# Patient Record
Sex: FEMALE | Race: WHITE
[De-identification: names, ages, dates, MRNs, and addresses within clinical notes are randomized per-mention and may not be internally consistent; named-entity substitution may affect disease eponyms.]

---

## 2018-05-01 ENCOUNTER — HOSPITAL ENCOUNTER (OUTPATIENT)
Age: 58
End: 2018-05-01
Payer: COMMERCIAL

## 2018-05-01 DIAGNOSIS — R25.1: Primary | ICD-10-CM

## 2018-05-01 DIAGNOSIS — R53.83: ICD-10-CM

## 2018-05-01 PROCEDURE — 70553 MRI BRAIN STEM W/O & W/DYE: CPT

## 2018-05-01 PROCEDURE — A9585 GADOBUTROL INJECTION: HCPCS

## 2019-03-19 ENCOUNTER — HOSPITAL ENCOUNTER (OUTPATIENT)
Age: 59
End: 2019-03-19
Payer: COMMERCIAL

## 2019-03-19 DIAGNOSIS — M79.671: ICD-10-CM

## 2019-03-19 DIAGNOSIS — G57.91: Primary | ICD-10-CM

## 2019-03-19 DIAGNOSIS — M79.672: ICD-10-CM

## 2019-03-19 DIAGNOSIS — G57.92: ICD-10-CM

## 2019-03-19 PROCEDURE — 95912 NRV CNDJ TEST 11-12 STUDIES: CPT

## 2019-03-19 PROCEDURE — 95886 MUSC TEST DONE W/N TEST COMP: CPT

## 2020-12-21 ENCOUNTER — HOSPITAL ENCOUNTER (OUTPATIENT)
Age: 60
End: 2020-12-21
Payer: COMMERCIAL

## 2020-12-21 DIAGNOSIS — R20.2: ICD-10-CM

## 2020-12-21 DIAGNOSIS — Z86.69: ICD-10-CM

## 2020-12-21 DIAGNOSIS — G62.9: Primary | ICD-10-CM

## 2020-12-21 PROCEDURE — 95886 MUSC TEST DONE W/N TEST COMP: CPT

## 2020-12-21 PROCEDURE — 95910 NRV CNDJ TEST 7-8 STUDIES: CPT

## 2023-06-23 ENCOUNTER — HOSPITAL ENCOUNTER (OUTPATIENT)
Dept: DATA CONVERSION | Facility: HOSPITAL | Age: 63
End: 2023-06-23
Attending: PAIN MEDICINE | Admitting: PAIN MEDICINE
Payer: MEDICARE

## 2023-06-23 DIAGNOSIS — Z98.51 TUBAL LIGATION STATUS: ICD-10-CM

## 2023-06-23 DIAGNOSIS — I11.0 HYPERTENSIVE HEART DISEASE WITH HEART FAILURE (MULTI): ICD-10-CM

## 2023-06-23 DIAGNOSIS — E03.9 HYPOTHYROIDISM, UNSPECIFIED: ICD-10-CM

## 2023-06-23 DIAGNOSIS — I95.1 ORTHOSTATIC HYPOTENSION: ICD-10-CM

## 2023-06-23 DIAGNOSIS — Z85.3 PERSONAL HISTORY OF MALIGNANT NEOPLASM OF BREAST: ICD-10-CM

## 2023-06-23 DIAGNOSIS — G20.A1 PARKINSON'S DISEASE (MULTI): ICD-10-CM

## 2023-06-23 DIAGNOSIS — M47.816 SPONDYLOSIS WITHOUT MYELOPATHY OR RADICULOPATHY, LUMBAR REGION: ICD-10-CM

## 2023-06-23 DIAGNOSIS — M48.062 SPINAL STENOSIS, LUMBAR REGION WITH NEUROGENIC CLAUDICATION: ICD-10-CM

## 2023-06-23 DIAGNOSIS — I48.91 UNSPECIFIED ATRIAL FIBRILLATION (MULTI): ICD-10-CM

## 2023-06-23 DIAGNOSIS — E66.9 OBESITY, UNSPECIFIED: ICD-10-CM

## 2023-06-23 DIAGNOSIS — Z90.710 ACQUIRED ABSENCE OF BOTH CERVIX AND UTERUS: ICD-10-CM

## 2023-06-23 DIAGNOSIS — G90.1 FAMILIAL DYSAUTONOMIA (RILEY-DAY) (MULTI): ICD-10-CM

## 2023-06-23 DIAGNOSIS — I50.9 HEART FAILURE, UNSPECIFIED (MULTI): ICD-10-CM

## 2023-06-23 DIAGNOSIS — Z79.82 LONG TERM (CURRENT) USE OF ASPIRIN: ICD-10-CM

## 2023-06-23 DIAGNOSIS — Z90.89 ACQUIRED ABSENCE OF OTHER ORGANS: ICD-10-CM

## 2023-06-23 DIAGNOSIS — R52 PAIN, UNSPECIFIED: ICD-10-CM

## 2023-06-23 DIAGNOSIS — I34.1 NONRHEUMATIC MITRAL (VALVE) PROLAPSE: ICD-10-CM

## 2023-07-11 ENCOUNTER — PATIENT OUTREACH (OUTPATIENT)
Dept: PRIMARY CARE | Facility: CLINIC | Age: 63
End: 2023-07-11
Payer: MEDICARE

## 2023-07-11 NOTE — PROGRESS NOTES
Discharge Facility: [facility] [place]  Discharge Diagnosis:  acute kidney injury  Admission Date: 7/7/2023  Discharge Date: 7/10/2023    PCP Appointment Date: 7/12/2023  Specialist Appointment Date: NONE  Hospital Encounter and Summary: Linked   See discharge assessment below for further details    Engagement  Call Start Time: 0918 (7/11/2023  9:18 AM)    Medications  Medications reviewed with patient/caregiver?: Yes (7/11/2023  9:18 AM)  Is the patient having any side effects they believe may be caused by any medication additions or changes?: No (7/11/2023  9:18 AM)  Does the patient have all medications ordered at discharge?: Yes (no new meds) (7/11/2023  9:18 AM)  Care Management Interventions: No intervention needed (7/11/2023  9:18 AM)  Is the patient taking all medications as directed (includes completed medication regime)?: Yes (7/11/2023  9:18 AM)  Medication Comments: see med list (7/11/2023  9:18 AM)    Appointments  Does the patient have a primary care provider?: Yes (7/11/2023  9:18 AM)  Care Management Interventions: Verified appointment date/time/provider (7/11/2023  9:18 AM)  Has the patient kept scheduled appointments due by today?: Yes (7/11/2023  9:18 AM)  Care Management Interventions: Advised patient to keep appointment (7/11/2023  9:18 AM)    Self Management  What is the home health agency?: none (7/11/2023  9:18 AM)  Has home health visited the patient within 72 hours of discharge?: Not applicable (7/11/2023  9:18 AM)    Patient Teaching  Does the patient have access to their discharge instructions?: Yes (7/11/2023  9:18 AM)  Care Management Interventions: Reviewed instructions with patient (7/11/2023  9:18 AM)  What is the patient's perception of their health status since discharge?: Same (7/11/2023  9:18 AM)  Is the patient/caregiver able to teach back the hierarchy of who to call/visit for symptoms/problems? PCP, Specialist, Home Health nurse, Urgent Care, ED, 911: Yes (7/11/2023  9:18 AM)    Wrap  Up  Wrap Up Additional Comments: spoke with patient. she stated that she still is on pain. feeling abut the same as when she left the hospital. she is just using tylenol for the pain. She does have an appt with her pcp for a hospital folloqw up to address this issue. no other problems or complaints at this time. patient has my contact info if any non emergent needs arise. (7/11/2023  9:18 AM)  Call End Time: 0924 (7/11/2023  9:18 AM)

## 2023-07-12 ENCOUNTER — OFFICE VISIT (OUTPATIENT)
Dept: PRIMARY CARE | Facility: CLINIC | Age: 63
End: 2023-07-12
Payer: MEDICARE

## 2023-07-12 VITALS
BODY MASS INDEX: 34.65 KG/M2 | OXYGEN SATURATION: 94 % | DIASTOLIC BLOOD PRESSURE: 68 MMHG | HEIGHT: 70 IN | SYSTOLIC BLOOD PRESSURE: 106 MMHG | HEART RATE: 80 BPM | WEIGHT: 242 LBS

## 2023-07-12 DIAGNOSIS — E66.09 CLASS 1 OBESITY DUE TO EXCESS CALORIES WITH SERIOUS COMORBIDITY AND BODY MASS INDEX (BMI) OF 34.0 TO 34.9 IN ADULT: ICD-10-CM

## 2023-07-12 DIAGNOSIS — I10 BENIGN ESSENTIAL HYPERTENSION: ICD-10-CM

## 2023-07-12 DIAGNOSIS — M25.50 POLYARTHRALGIA: ICD-10-CM

## 2023-07-12 DIAGNOSIS — G20.A1 PARKINSON'S DISEASE (MULTI): ICD-10-CM

## 2023-07-12 DIAGNOSIS — M25.512 CHRONIC LEFT SHOULDER PAIN: ICD-10-CM

## 2023-07-12 DIAGNOSIS — Z09 HOSPITAL DISCHARGE FOLLOW-UP: Primary | ICD-10-CM

## 2023-07-12 DIAGNOSIS — G89.29 CHRONIC LEFT SHOULDER PAIN: ICD-10-CM

## 2023-07-12 DIAGNOSIS — E55.9 VITAMIN D DEFICIENCY: ICD-10-CM

## 2023-07-12 DIAGNOSIS — N28.9 ACUTE RENAL INSUFFICIENCY: ICD-10-CM

## 2023-07-12 PROBLEM — R10.9 ABDOMINAL SPASMS: Status: ACTIVE | Noted: 2023-07-12

## 2023-07-12 PROBLEM — E66.9 OBESITY: Status: ACTIVE | Noted: 2023-07-12

## 2023-07-12 PROBLEM — N95.0 POSTMENOPAUSAL BLEEDING: Status: ACTIVE | Noted: 2018-02-28

## 2023-07-12 PROBLEM — R10.11 RUQ DISCOMFORT: Status: ACTIVE | Noted: 2023-07-12

## 2023-07-12 PROBLEM — M53.3 CHRONIC SI JOINT PAIN: Status: ACTIVE | Noted: 2023-07-12

## 2023-07-12 PROBLEM — M79.642 PAIN OF LEFT HAND: Status: ACTIVE | Noted: 2023-07-12

## 2023-07-12 PROBLEM — G56.01 CARPAL TUNNEL SYNDROME OF RIGHT WRIST: Status: ACTIVE | Noted: 2023-07-12

## 2023-07-12 PROBLEM — K21.9 GERD (GASTROESOPHAGEAL REFLUX DISEASE): Status: ACTIVE | Noted: 2020-05-17

## 2023-07-12 PROBLEM — R73.02 IMPAIRED GLUCOSE TOLERANCE: Status: ACTIVE | Noted: 2023-07-12

## 2023-07-12 PROBLEM — M46.1 BILATERAL SACROILIITIS (CMS-HCC): Status: ACTIVE | Noted: 2023-07-12

## 2023-07-12 PROBLEM — M75.32 CALCIFIC TENDONITIS OF LEFT SHOULDER: Status: ACTIVE | Noted: 2017-11-16

## 2023-07-12 PROBLEM — M25.532 LEFT WRIST PAIN: Status: ACTIVE | Noted: 2023-07-12

## 2023-07-12 PROBLEM — R20.2 NUMBNESS AND TINGLING IN BOTH HANDS: Status: ACTIVE | Noted: 2023-07-12

## 2023-07-12 PROBLEM — E66.811 CLASS 1 OBESITY DUE TO EXCESS CALORIES WITH SERIOUS COMORBIDITY AND BODY MASS INDEX (BMI) OF 34.0 TO 34.9 IN ADULT: Status: ACTIVE | Noted: 2023-07-12

## 2023-07-12 PROBLEM — R20.0 NUMBNESS AND TINGLING IN BOTH HANDS: Status: ACTIVE | Noted: 2023-07-12

## 2023-07-12 PROBLEM — C77.9: Status: ACTIVE | Noted: 2020-06-16

## 2023-07-12 PROBLEM — K76.9 LIVER LESION: Status: ACTIVE | Noted: 2023-07-12

## 2023-07-12 PROBLEM — Z86.69 HISTORY OF CARPAL TUNNEL SYNDROME: Status: ACTIVE | Noted: 2023-07-12

## 2023-07-12 PROBLEM — M25.562 LEFT KNEE PAIN: Status: ACTIVE | Noted: 2023-07-12

## 2023-07-12 PROBLEM — E53.9 VITAMIN B DEFICIENCY: Status: ACTIVE | Noted: 2023-07-12

## 2023-07-12 PROBLEM — S62.328A CLOSED FRACTURE OF SHAFT OF FIFTH METACARPAL BONE: Status: ACTIVE | Noted: 2023-07-12

## 2023-07-12 PROBLEM — R50.9 FEVER: Status: ACTIVE | Noted: 2020-05-15

## 2023-07-12 PROBLEM — M46.1 BILATERAL SACROILIITIS (CMS-HCC): Status: RESOLVED | Noted: 2023-07-12 | Resolved: 2023-07-12

## 2023-07-12 PROBLEM — N30.00 ACUTE CYSTITIS: Status: ACTIVE | Noted: 2020-05-15

## 2023-07-12 PROBLEM — M25.522 LEFT ELBOW PAIN: Status: ACTIVE | Noted: 2023-07-12

## 2023-07-12 PROBLEM — I31.9 PERICARDITIS (HHS-HCC): Status: ACTIVE | Noted: 2023-07-12

## 2023-07-12 PROBLEM — M79.645 PAIN OF FINGER OF LEFT HAND: Status: ACTIVE | Noted: 2023-07-12

## 2023-07-12 PROBLEM — M75.31 CALCIFIC TENDONITIS OF RIGHT SHOULDER: Status: ACTIVE | Noted: 2017-05-16

## 2023-07-12 PROBLEM — C50.911: Status: ACTIVE | Noted: 2020-06-16

## 2023-07-12 PROBLEM — M51.36 DISC DEGENERATION, LUMBAR: Status: ACTIVE | Noted: 2023-07-12

## 2023-07-12 PROBLEM — E03.9 HYPOTHYROIDISM: Status: ACTIVE | Noted: 2020-05-17

## 2023-07-12 PROBLEM — M47.816 LUMBAR ARTHROPATHY: Status: ACTIVE | Noted: 2023-07-12

## 2023-07-12 PROBLEM — R14.0 ABDOMINAL BLOATING: Status: ACTIVE | Noted: 2023-07-12

## 2023-07-12 PROBLEM — M25.511 RIGHT SHOULDER PAIN: Status: ACTIVE | Noted: 2017-05-16

## 2023-07-12 PROBLEM — G62.9 NEUROPATHY: Status: ACTIVE | Noted: 2023-07-12

## 2023-07-12 PROBLEM — B37.0 THRUSH: Status: ACTIVE | Noted: 2020-05-18

## 2023-07-12 PROBLEM — M25.40: Status: ACTIVE | Noted: 2023-07-12

## 2023-07-12 PROBLEM — I34.1 MITRAL VALVE PROLAPSE: Status: ACTIVE | Noted: 2023-07-12

## 2023-07-12 PROBLEM — M13.0 POLYARTHROPATHY: Status: ACTIVE | Noted: 2023-07-12

## 2023-07-12 PROBLEM — M25.552 PAIN OF LEFT HIP JOINT: Status: ACTIVE | Noted: 2023-07-12

## 2023-07-12 PROBLEM — S52.123A CLOSED FRACTURE OF HEAD OF RADIUS: Status: ACTIVE | Noted: 2023-07-12

## 2023-07-12 PROBLEM — R07.89 CHEST HEAVINESS: Status: ACTIVE | Noted: 2023-07-12

## 2023-07-12 PROBLEM — M48.062 SPINAL STENOSIS, LUMBAR REGION WITH NEUROGENIC CLAUDICATION: Status: ACTIVE | Noted: 2023-07-12

## 2023-07-12 PROBLEM — E03.4 HYPOTHYROIDISM DUE TO ACQUIRED ATROPHY OF THYROID: Status: ACTIVE | Noted: 2023-07-12

## 2023-07-12 PROBLEM — E78.00 HYPERCHOLESTEREMIA: Status: ACTIVE | Noted: 2023-07-12

## 2023-07-12 PROBLEM — R25.1 TREMORS OF NERVOUS SYSTEM: Status: ACTIVE | Noted: 2023-07-12

## 2023-07-12 PROBLEM — N39.0 UTI (URINARY TRACT INFECTION): Status: ACTIVE | Noted: 2023-07-12

## 2023-07-12 PROBLEM — G56.02 CARPAL TUNNEL SYNDROME, LEFT: Status: ACTIVE | Noted: 2023-07-12

## 2023-07-12 PROBLEM — E78.2 HYPERCHOLESTEROLEMIA WITH HYPERTRIGLYCERIDEMIA: Status: ACTIVE | Noted: 2023-07-12

## 2023-07-12 PROBLEM — D64.9 LOW HEMOGLOBIN AND LOW HEMATOCRIT: Status: ACTIVE | Noted: 2023-07-12

## 2023-07-12 PROBLEM — M85.80 OSTEOPENIA: Status: ACTIVE | Noted: 2023-07-12

## 2023-07-12 PROBLEM — Z85.3 PERSONAL HISTORY OF BREAST CANCER: Status: ACTIVE | Noted: 2020-06-16

## 2023-07-12 PROBLEM — M51.369 DISC DEGENERATION, LUMBAR: Status: ACTIVE | Noted: 2023-07-12

## 2023-07-12 PROBLEM — C50.919 BREAST CANCER, FEMALE (MULTI): Status: ACTIVE | Noted: 2023-07-12

## 2023-07-12 PROBLEM — Q21.11 SECUNDUM ASD (HHS-HCC): Status: ACTIVE | Noted: 2023-07-12

## 2023-07-12 PROBLEM — M54.16 CHRONIC RADICULAR LOW BACK PAIN: Status: ACTIVE | Noted: 2023-07-12

## 2023-07-12 PROBLEM — I95.1 ORTHOSTATIC HYPOTENSION: Status: ACTIVE | Noted: 2023-07-12

## 2023-07-12 PROBLEM — I20.9 ANGINA PECTORIS (CMS-HCC): Status: RESOLVED | Noted: 2023-07-12 | Resolved: 2023-07-12

## 2023-07-12 PROBLEM — M54.50 CHRONIC LOW BACK PAIN: Status: ACTIVE | Noted: 2023-07-12

## 2023-07-12 PROBLEM — R55 RECURRENT SYNCOPE: Status: ACTIVE | Noted: 2020-05-17

## 2023-07-12 PROBLEM — K59.09 CHRONIC CONSTIPATION: Status: ACTIVE | Noted: 2023-07-12

## 2023-07-12 PROBLEM — R91.1 NODULE OF LEFT LUNG: Status: ACTIVE | Noted: 2023-07-12

## 2023-07-12 PROBLEM — M25.511 SHOULDER PAIN, BILATERAL: Status: ACTIVE | Noted: 2023-07-12

## 2023-07-12 PROBLEM — E03.4 HYPOTHYROIDISM DUE TO ACQUIRED ATROPHY OF THYROID: Status: ACTIVE | Noted: 2020-05-17

## 2023-07-12 PROBLEM — I31.39 PERICARDIAL EFFUSION (HHS-HCC): Status: ACTIVE | Noted: 2023-07-12

## 2023-07-12 PROBLEM — R42 DIZZINESS: Status: ACTIVE | Noted: 2023-07-12

## 2023-07-12 PROBLEM — I20.9 ANGINA PECTORIS (CMS-HCC): Status: ACTIVE | Noted: 2023-07-12

## 2023-07-12 PROBLEM — R53.1 WEAKNESS: Status: ACTIVE | Noted: 2023-07-12

## 2023-07-12 PROBLEM — M54.2 NECK PAIN: Status: ACTIVE | Noted: 2017-11-16

## 2023-07-12 PROCEDURE — 3078F DIAST BP <80 MM HG: CPT | Performed by: PHYSICIAN ASSISTANT

## 2023-07-12 PROCEDURE — 1036F TOBACCO NON-USER: CPT | Performed by: PHYSICIAN ASSISTANT

## 2023-07-12 PROCEDURE — 99495 TRANSJ CARE MGMT MOD F2F 14D: CPT | Performed by: PHYSICIAN ASSISTANT

## 2023-07-12 PROCEDURE — 3074F SYST BP LT 130 MM HG: CPT | Performed by: PHYSICIAN ASSISTANT

## 2023-07-12 RX ORDER — LEVOTHYROXINE SODIUM 50 UG/1
1 TABLET ORAL DAILY
COMMUNITY
Start: 2018-02-28 | End: 2024-02-14 | Stop reason: SDUPTHER

## 2023-07-12 RX ORDER — CARBIDOPA AND LEVODOPA 25; 100 MG/1; MG/1
2 TABLET, ORALLY DISINTEGRATING ORAL 4 TIMES DAILY
COMMUNITY
Start: 2019-10-17

## 2023-07-12 RX ORDER — ATORVASTATIN CALCIUM 40 MG/1
1 TABLET, FILM COATED ORAL DAILY
COMMUNITY
Start: 2020-08-05 | End: 2024-02-29 | Stop reason: SDUPTHER

## 2023-07-12 RX ORDER — RASAGILINE 1 MG/1
1 TABLET ORAL
Qty: 30 TABLET | Refills: 58 | COMMUNITY
Start: 2019-04-17 | End: 2024-01-04 | Stop reason: ALTCHOICE

## 2023-07-12 RX ORDER — ERGOCALCIFEROL 1.25 MG/1
50000 CAPSULE ORAL
COMMUNITY
Start: 2022-02-01 | End: 2023-07-12 | Stop reason: SDUPTHER

## 2023-07-12 RX ORDER — MIDODRINE HYDROCHLORIDE 5 MG/1
5 TABLET ORAL 3 TIMES DAILY
COMMUNITY
End: 2024-01-04 | Stop reason: ALTCHOICE

## 2023-07-12 RX ORDER — NAPROXEN SODIUM 220 MG/1
1 TABLET, FILM COATED ORAL DAILY
COMMUNITY

## 2023-07-12 RX ORDER — ERGOCALCIFEROL 1.25 MG/1
50000 CAPSULE ORAL
Qty: 4 CAPSULE | Refills: 11 | Status: SHIPPED | OUTPATIENT
Start: 2023-07-12 | End: 2024-07-11

## 2023-07-12 RX ORDER — LETROZOLE 2.5 MG/1
1 TABLET, FILM COATED ORAL DAILY
COMMUNITY
Start: 2022-08-01 | End: 2024-02-12 | Stop reason: SDUPTHER

## 2023-07-12 RX ORDER — PNV NO.95/FERROUS FUM/FOLIC AC 28MG-0.8MG
1 TABLET ORAL DAILY
COMMUNITY
Start: 2020-11-05

## 2023-07-12 RX ORDER — IBUPROFEN 200 MG
1 CAPSULE ORAL DAILY
COMMUNITY
Start: 2020-11-05

## 2023-07-12 RX ORDER — GABAPENTIN 300 MG/1
300 CAPSULE ORAL 4 TIMES DAILY
COMMUNITY
Start: 2022-10-13 | End: 2023-11-27 | Stop reason: SDUPTHER

## 2023-07-12 ASSESSMENT — ENCOUNTER SYMPTOMS
FLANK PAIN: 0
HEADACHES: 0
CONFUSION: 0
RHINORRHEA: 0
PALPITATIONS: 0
WHEEZING: 0
ABDOMINAL PAIN: 0
EYE REDNESS: 0
NUMBNESS: 0
EYE DISCHARGE: 0
COUGH: 0
SINUS PAIN: 0
DIARRHEA: 0
NAUSEA: 0
FREQUENCY: 0
SHORTNESS OF BREATH: 0
BRUISES/BLEEDS EASILY: 0
FATIGUE: 1
DIZZINESS: 0
CHEST TIGHTNESS: 0
ARTHRALGIAS: 1
NECK PAIN: 0
CONSTIPATION: 0
TREMORS: 0
CHILLS: 0
BACK PAIN: 0
JOINT SWELLING: 1
SLEEP DISTURBANCE: 0
VOMITING: 0
SORE THROAT: 0
WOUND: 0
FEVER: 0

## 2023-07-12 ASSESSMENT — PATIENT HEALTH QUESTIONNAIRE - PHQ9
SUM OF ALL RESPONSES TO PHQ9 QUESTIONS 1 AND 2: 0
2. FEELING DOWN, DEPRESSED OR HOPELESS: NOT AT ALL
1. LITTLE INTEREST OR PLEASURE IN DOING THINGS: NOT AT ALL

## 2023-07-12 NOTE — PROGRESS NOTES
Subjective   Patient ID: Nusrat Irizarry is a 62 y.o. female who presents for Hospital Follow-up (Has some questions about medications. Would like to know about getting cortisone shots in her left shoulder. )    HPI  Hosp discharge follow up   Diagnosed with polyarthritis and join effusion and acute kidney injury    She was given a course of Abx and fluids and symptoms improved so was discharged and set to follow up with ortho in the near future       L shoulder pain and hx of cortisone injection in the past.  She is interested in injections again.  Advised to further discuss with ortho - Dr Banks     med check   hypotension -stable   L shoulder/back pain -previously following with dr saravia  hypothyroid - on meds   vit D -    onc - transferred to       Preventative Testing   Mammo - april 2023  DEXA- april 2022 osteopenia   colonoscopy nov 2019 - dr chun - repeat in 5 years   PAP - 2018 - hx of hysterectomy   lung nodule - monitoring on imaging - July 2022- unchanged   Depression screen - PHQ2 NEG July 2023   Fall -NEG JULY 2023     Patient Active Problem List   Diagnosis    Vitamin D deficiency    Vitamin B deficiency    UTI (urinary tract infection)    Tremors of nervous system    Spinal stenosis, lumbar region with neurogenic claudication    Secundum ASD    RUQ discomfort    Polyarthropathy    Joint effusion of lower extremity    Pericarditis    Pericardial effusion    Parkinson's disease (CMS/HCC)    Pain of left hip joint    Shoulder pain, bilateral    Pain in left shoulder    Left elbow pain    Osteopenia    Orthostatic hypotension    Obesity    Numbness and tingling in both hands    Nodule of left lung    Neuropathy    Mitral valve prolapse    Lumbar arthropathy    Low hemoglobin and low hematocrit    Liver lesion    Pain of left hand    Pain of finger of left hand    Left wrist pain    Left knee pain    Kidney lesion    Impaired glucose tolerance    Hypothyroidism due to acquired atrophy of thyroid     Hypercholesterolemia with hypertriglyceridemia    Hypercholesteremia    History of carpal tunnel syndrome    Dizziness    Disc degeneration, lumbar    Closed fracture of shaft of fifth metacarpal bone    Closed fracture of head of radius    Chronic SI joint pain    Chronic radicular low back pain    Chronic low back pain    Chronic constipation    Chest heaviness    Carpal tunnel syndrome, left    Carpal tunnel syndrome of right wrist    Breast cancer, female (CMS/HCC)    Bilateral sacroiliitis (CMS/HCC)    Benign essential hypertension    Angina pectoris (CMS/HCC)    Acute renal insufficiency    Abdominal spasms    Abdominal bloating    Weakness    Ostium secundum atrial septal defect    Right shoulder pain    Acute cystitis    Borderline systolic hypertension    Calcific tendonitis of left shoulder    Calcific tendonitis of right shoulder    Fever    GERD (gastroesophageal reflux disease)    Hypertension    Hypothyroidism    Neck pain    Postmenopausal bleeding    Recurrent syncope    Right heart enlargement    Thrush    Right breast cancer with malignant cells in regional lymph nodes no greater than 0.2 mm and no more than 200 cells (CMS/HCC)       Review of Systems   Constitutional:  Positive for fatigue. Negative for chills and fever.   HENT:  Negative for congestion, rhinorrhea, sinus pain, sore throat and tinnitus.    Eyes:  Negative for discharge, redness and visual disturbance.   Respiratory:  Negative for cough, chest tightness, shortness of breath and wheezing.    Cardiovascular:  Negative for chest pain, palpitations and leg swelling.   Gastrointestinal:  Negative for abdominal pain, constipation, diarrhea, nausea and vomiting.   Endocrine: Negative for cold intolerance and heat intolerance.   Genitourinary:  Negative for flank pain, frequency and urgency.   Musculoskeletal:  Positive for arthralgias and joint swelling. Negative for back pain, gait problem and neck pain.   Skin:  Negative for rash and  wound.   Neurological:  Negative for dizziness, tremors, syncope, numbness and headaches.   Hematological:  Does not bruise/bleed easily.   Psychiatric/Behavioral:  Negative for confusion, sleep disturbance and suicidal ideas.        Past Medical History:   Diagnosis Date    Other specified postprocedural states     History of Papanicolaou smear    Personal history of colonic polyps     Personal history of colonic polyps    Personal history of other diseases of the circulatory system     History of hypertension    Personal history of other endocrine, nutritional and metabolic disease     History of hypothyroidism    Personal history of other endocrine, nutritional and metabolic disease     History of hyperglycemia    Personal history of other malignant neoplasm of skin     History of malignant neoplasm of skin    Personal history of other medical treatment     History of screening mammography       Past Surgical History:   Procedure Laterality Date    APPENDECTOMY  10/02/2018    Appendectomy    CARDIAC SURGERY  04/20/2020    Heart Surgery    COLONOSCOPY  04/20/2020    Colonoscopy    CT HEART CORONARY ANGIOGRAM  9/16/2021    CT HEART CORONARY ANGIOGRAM 9/16/2021 [facility]    HYSTERECTOMY  04/20/2020    Hysterectomy    INCISIONAL BREAST BIOPSY  10/02/2018    Incisional Breast Biopsy    OTHER SURGICAL HISTORY  10/02/2018    Bunion Correction By Osborne Procedure    OTHER SURGICAL HISTORY  01/05/2023    Intra-articular corticosteroid injection    OTHER SURGICAL HISTORY  01/21/2022    Epidural steroid injection    OTHER SURGICAL HISTORY  12/08/2021    Radiofrequency ablation    OTHER SURGICAL HISTORY  08/23/2021    Medial branch block    OTHER SURGICAL HISTORY  09/01/2021    Carpal tunnel surgery    OTHER SURGICAL HISTORY  09/23/2021    Medial branch block    OTHER SURGICAL HISTORY  06/17/2022    Epidural steroid injection    OTHER SURGICAL HISTORY  11/17/2022    Epidural steroid injection    OTHER SURGICAL  "HISTORY  10/10/2018    Axillary Lymphadenectomy    OTHER SURGICAL HISTORY  04/07/2021    Colonoscopy    TONSILLECTOMY  04/20/2020    Tonsillectomy With Adenoidectomy    TUBAL LIGATION  04/20/2020    Tubal Ligation       No family history on file.    Social History     Tobacco Use    Smoking status: Never    Smokeless tobacco: Never   Vaping Use    Vaping Use: Never used   Substance Use Topics    Alcohol use: Yes    Drug use: Never       Allergies   Allergen Reactions    Bee Venom Protein (Honey Bee) Anaphylaxis       Current Outpatient Medications   Medication Sig Dispense Refill    aspirin 81 mg chewable tablet Chew 1 tablet (81 mg) once daily.      atorvastatin (Lipitor) 40 mg tablet Take 1 tablet (40 mg) by mouth once daily.      calcium citrate (Calcitrate) 200 mg (950 mg) tablet Take 1 tablet (200 mg) by mouth once daily.      carbidopa-levodopa (Parcopa)  mg disintegrating tablet Take 2 tablets by mouth 4 times a day.      cyanocobalamin (Vitamin B-12) 100 mcg tablet Take 1 tablet (100 mcg) by mouth once daily.      ergocalciferol (Vitamin D-2) 1.25 MG (21282 UT) capsule Take 1 capsule (50,000 Units) by mouth 1 (one) time per week.      gabapentin (Neurontin) 300 mg capsule Take 1 capsule (300 mg) by mouth 4 times a day.      letrozole (Femara) 2.5 mg tablet Take 1 tablet (2.5 mg total) by mouth once daily.      levothyroxine (Synthroid, Levoxyl) 50 mcg tablet Take 1 tablet (50 mcg) by mouth once daily.      midodrine (Proamatine) 5 mg tablet Take 1 tablet (5 mg) by mouth 3 times a day.      rasagiline (Azilect) 1 mg tablet Take 1 tablet (1 mg) by mouth once daily. 30 tablet 58     No current facility-administered medications for this visit.       Objective   /68   Pulse 80   Ht 1.778 m (5' 10\")   Wt 110 kg (242 lb)   SpO2 94%   BMI 34.72 kg/m²     Physical Exam  Vitals reviewed.   Constitutional:       Appearance: Normal appearance. She is obese.   HENT:      Head: Normocephalic.      Right " Ear: External ear normal.      Left Ear: External ear normal.      Nose: Nose normal. No congestion or rhinorrhea.      Mouth/Throat:      Mouth: Mucous membranes are moist.   Eyes:      Extraocular Movements: Extraocular movements intact.      Conjunctiva/sclera: Conjunctivae normal.      Pupils: Pupils are equal, round, and reactive to light.   Cardiovascular:      Rate and Rhythm: Normal rate and regular rhythm.      Pulses: Normal pulses.   Pulmonary:      Effort: Pulmonary effort is normal.      Breath sounds: Normal breath sounds.   Abdominal:      General: Bowel sounds are normal.      Palpations: Abdomen is soft.      Tenderness: There is no abdominal tenderness. There is no right CVA tenderness or left CVA tenderness.   Musculoskeletal:         General: No tenderness. Normal range of motion.      Cervical back: Normal range of motion and neck supple. No tenderness.   Skin:     General: Skin is warm and dry.   Neurological:      General: No focal deficit present.      Mental Status: She is alert and oriented to person, place, and time.   Psychiatric:         Mood and Affect: Mood normal.         Behavior: Behavior normal.       Testing   Reviewed reports and imaging on file from the hosp stay       Impression    MDM    1) COMPLEXITY: MORE THAN 1 STABLE CHRONIC CONDITION ADDRESSED  2)DATA: TESTS INTERPRETED AND OR ORDERED, TOOK INDEPENDENT HISTORY OR RECORDS REVIEWED  3)RISK: MODERATE RISK DUE TO NATURE OF MEDICAL CONDITIONS/COMORBIDITY OR MEDICATIONS ORDERED OR SURGICAL OR PROCEDURE REFERRAL, .    Reviewed labs and Testing on file   Patient to follow diet low in cholesterol, fat, and sodium.    Patient is advised to increase Exercise.  Patient is recommended to lose weight.  Reviewed Meds and discussed common side effects  Continue as directed   Knee concerns - follow with dr Banks - we discussed less likely septic arthritic given PE and some of her labs but consider additional work up and to see ortho for  this   Consider connective tissue d/o and consider auto-immune work up = pt to call if she wishes to pursue  BP - improving since hops and is trending low again so re-start midodarine   Shoulder concerns - follow with ortho   Patient is strongly advised to be compliant with recommendations.    Return to Clinic sooner if needed.  Patient denies further questions/concerns at this time       Assessment/Plan   Problem List Items Addressed This Visit       Vitamin D deficiency    Relevant Medications    ergocalciferol (Vitamin D-2) 1.25 MG (45221 UT) capsule    Parkinson's disease (CMS/HCC)    Pain in left shoulder    Class 1 obesity due to excess calories with serious comorbidity and body mass index (BMI) of 34.0 to 34.9 in adult    Benign essential hypertension    Acute renal insufficiency    Polyarthralgia     Other Visit Diagnoses       Hospital discharge follow-up    -  Primary             FU as before in sept

## 2023-07-18 ENCOUNTER — PATIENT OUTREACH (OUTPATIENT)
Dept: PRIMARY CARE | Facility: CLINIC | Age: 63
End: 2023-07-18
Payer: MEDICARE

## 2023-07-18 LAB
ANION GAP IN SER/PLAS: 11 MMOL/L (ref 10–20)
C REACTIVE PROTEIN (MG/L) IN SER/PLAS: 2.48 MG/DL
CALCIUM (MG/DL) IN SER/PLAS: 8.9 MG/DL (ref 8.6–10.3)
CARBON DIOXIDE, TOTAL (MMOL/L) IN SER/PLAS: 28 MMOL/L (ref 21–32)
CHLORIDE (MMOL/L) IN SER/PLAS: 105 MMOL/L (ref 98–107)
CREATININE (MG/DL) IN SER/PLAS: 1.2 MG/DL (ref 0.5–1.05)
GFR FEMALE: 50 ML/MIN/1.73M2
GLUCOSE (MG/DL) IN SER/PLAS: 110 MG/DL (ref 74–99)
POTASSIUM (MMOL/L) IN SER/PLAS: 4.4 MMOL/L (ref 3.5–5.3)
SEDIMENTATION RATE, ERYTHROCYTE: 27 MM/H (ref 0–30)
SODIUM (MMOL/L) IN SER/PLAS: 140 MMOL/L (ref 136–145)
UREA NITROGEN (MG/DL) IN SER/PLAS: 26 MG/DL (ref 6–23)

## 2023-07-18 NOTE — PROGRESS NOTES
Unable to reach patient for call back after patient's follow up appointment with PCP.   YUNIORM with call back number for patient to call if needed   If no voicemail available call attempts x 2 were made to contact the patient to assist with any questions or concerns patient may have.

## 2023-07-19 ENCOUNTER — TELEPHONE (OUTPATIENT)
Dept: PRIMARY CARE | Facility: CLINIC | Age: 63
End: 2023-07-19
Payer: MEDICARE

## 2023-07-19 NOTE — TELEPHONE ENCOUNTER
Rosario Peres NP from ortho sees Nusrat and she was hospitalized with acute kidney injury and polyarthrigia and wants to talk to you about a plan for her her number is [phone] she will be in office from 12-1

## 2023-08-08 PROBLEM — M25.462 EFFUSION OF LEFT KNEE: Status: RESOLVED | Noted: 2023-08-08 | Resolved: 2023-08-08

## 2023-08-08 PROBLEM — R53.83 MALAISE AND FATIGUE: Status: ACTIVE | Noted: 2023-08-08

## 2023-08-08 PROBLEM — C50.919 BREAST CANCER, FEMALE (MULTI): Status: ACTIVE | Noted: 2023-08-08

## 2023-08-08 PROBLEM — R53.81 MALAISE AND FATIGUE: Status: ACTIVE | Noted: 2023-08-08

## 2023-08-16 ENCOUNTER — OFFICE VISIT (OUTPATIENT)
Dept: PRIMARY CARE | Facility: CLINIC | Age: 63
End: 2023-08-16
Payer: MEDICARE

## 2023-08-16 VITALS
HEART RATE: 78 BPM | DIASTOLIC BLOOD PRESSURE: 79 MMHG | HEIGHT: 69 IN | BODY MASS INDEX: 32.73 KG/M2 | SYSTOLIC BLOOD PRESSURE: 122 MMHG | WEIGHT: 221 LBS

## 2023-08-16 DIAGNOSIS — R42 DIZZINESS: ICD-10-CM

## 2023-08-16 DIAGNOSIS — N28.9 ACUTE RENAL INSUFFICIENCY: Primary | ICD-10-CM

## 2023-08-16 DIAGNOSIS — R53.83 MALAISE AND FATIGUE: ICD-10-CM

## 2023-08-16 DIAGNOSIS — R53.81 MALAISE AND FATIGUE: ICD-10-CM

## 2023-08-16 DIAGNOSIS — Z85.3 PERSONAL HISTORY OF BREAST CANCER: ICD-10-CM

## 2023-08-16 DIAGNOSIS — G20.A1 PARKINSON'S DISEASE (MULTI): ICD-10-CM

## 2023-08-16 DIAGNOSIS — G62.9 NEUROPATHY: ICD-10-CM

## 2023-08-16 DIAGNOSIS — E66.09 CLASS 1 OBESITY DUE TO EXCESS CALORIES WITH SERIOUS COMORBIDITY AND BODY MASS INDEX (BMI) OF 32.0 TO 32.9 IN ADULT: ICD-10-CM

## 2023-08-16 DIAGNOSIS — I95.1 ORTHOSTATIC HYPOTENSION: ICD-10-CM

## 2023-08-16 DIAGNOSIS — I10 BENIGN ESSENTIAL HYPERTENSION: ICD-10-CM

## 2023-08-16 PROBLEM — B37.0 THRUSH: Status: RESOLVED | Noted: 2020-05-18 | Resolved: 2023-08-16

## 2023-08-16 PROBLEM — N39.0 UTI (URINARY TRACT INFECTION): Status: RESOLVED | Noted: 2023-07-12 | Resolved: 2023-08-16

## 2023-08-16 PROBLEM — C50.919 BREAST CANCER, FEMALE (MULTI): Status: RESOLVED | Noted: 2023-08-08 | Resolved: 2023-08-16

## 2023-08-16 PROBLEM — N30.00 ACUTE CYSTITIS: Status: RESOLVED | Noted: 2020-05-15 | Resolved: 2023-08-16

## 2023-08-16 PROCEDURE — 3008F BODY MASS INDEX DOCD: CPT | Performed by: PHYSICIAN ASSISTANT

## 2023-08-16 PROCEDURE — 3074F SYST BP LT 130 MM HG: CPT | Performed by: PHYSICIAN ASSISTANT

## 2023-08-16 PROCEDURE — 3078F DIAST BP <80 MM HG: CPT | Performed by: PHYSICIAN ASSISTANT

## 2023-08-16 PROCEDURE — 99214 OFFICE O/P EST MOD 30 MIN: CPT | Performed by: PHYSICIAN ASSISTANT

## 2023-08-16 PROCEDURE — 1036F TOBACCO NON-USER: CPT | Performed by: PHYSICIAN ASSISTANT

## 2023-08-16 ASSESSMENT — ENCOUNTER SYMPTOMS
FLANK PAIN: 0
FREQUENCY: 0
FATIGUE: 0
BACK PAIN: 0
SHORTNESS OF BREATH: 0
NAUSEA: 0
BRUISES/BLEEDS EASILY: 0
ABDOMINAL PAIN: 0
NUMBNESS: 1
SORE THROAT: 0
DIARRHEA: 0
RHINORRHEA: 0
WOUND: 0
FEVER: 0
CHILLS: 0
SINUS PAIN: 0
CONSTIPATION: 0
PALPITATIONS: 0
WHEEZING: 0
EYE REDNESS: 0
EYE DISCHARGE: 0
COUGH: 0
VOMITING: 0
CHEST TIGHTNESS: 0
DIZZINESS: 0
CONFUSION: 0
TREMORS: 0
HEADACHES: 0
SLEEP DISTURBANCE: 0
NECK PAIN: 0

## 2023-08-16 ASSESSMENT — PATIENT HEALTH QUESTIONNAIRE - PHQ9
2. FEELING DOWN, DEPRESSED OR HOPELESS: NOT AT ALL
SUM OF ALL RESPONSES TO PHQ9 QUESTIONS 1 AND 2: 0
1. LITTLE INTEREST OR PLEASURE IN DOING THINGS: NOT AT ALL

## 2023-08-16 NOTE — PROGRESS NOTES
Subjective   Patient ID: Nusrat Irizarry is a 62 y.o. female who presents for Follow-up (HOSP DISCHARGE F/U-REACTION TO CANCER TREATMENT--ACUTE KIDNEY INJURY; KIDNEY FUNCTION LOW-DISCUSS LABS)    HPI    Hosp discharge follow up - was seen July 12 2023 for this   Diagnosed with polyarthritis and join effusion and acute kidney injury    She was given a course of Abx and fluids and symptoms improved so was discharged and set to follow up with ortho iwhich she has since seen and joint prob is not resolved /stable   7/7/23 eGFR 38   7/9/23 eGFR 52  7/10/23 eGFR 65  7/18/23 eGFR 50        med check   hypotension -stable   L shoulder/back pain -previously following with dr saravia  hypothyroid - on meds   vit D -    Breast cancer - onc - transferred to        Preventative Testing   Mammo - april 2023  DEXA- april 2022 osteopenia   colonoscopy nov 2019 - dr chun - repeat in 5 years   PAP - 2018 - hx of hysterectomy   lung nodule - monitoring on imaging - July 2022- unchanged   Depression screen - PHQ2 NEG July 2023   Fall -NEG JULY 2023      Patient Active Problem List   Diagnosis    Vitamin D deficiency    Vitamin B deficiency    Tremors of nervous system    Spinal stenosis, lumbar region with neurogenic claudication    Secundum ASD    RUQ discomfort    Polyarthropathy    Joint effusion of lower extremity    Pericarditis    Pericardial effusion    Parkinson's disease (CMS/HCC)    Pain of left hip joint    Shoulder pain, bilateral    Pain in left shoulder    Left elbow pain    Osteopenia    Orthostatic hypotension    Class 1 obesity due to excess calories with serious comorbidity and body mass index (BMI) of 34.0 to 34.9 in adult    Numbness and tingling in both hands    Nodule of left lung    Neuropathy    Mitral valve prolapse    Lumbar arthropathy    Low hemoglobin and low hematocrit    Liver lesion    Pain of left hand    Pain of finger of left hand    Left wrist pain    Left knee pain    Kidney lesion    Impaired  glucose tolerance    Hypothyroidism due to acquired atrophy of thyroid    Hypercholesterolemia with hypertriglyceridemia    Hypercholesteremia    History of carpal tunnel syndrome    Dizziness    Disc degeneration, lumbar    Closed fracture of shaft of fifth metacarpal bone    Closed fracture of head of radius    Chronic SI joint pain    Chronic radicular low back pain    Chronic low back pain    Chronic constipation    Chest heaviness    Carpal tunnel syndrome, left    Carpal tunnel syndrome of right wrist    Benign essential hypertension    Acute renal insufficiency    Abdominal spasms    Abdominal bloating    Weakness    Ostium secundum atrial septal defect    Right shoulder pain    Calcific tendonitis of left shoulder    Calcific tendonitis of right shoulder    Fever    GERD (gastroesophageal reflux disease)    Neck pain    Postmenopausal bleeding    Recurrent syncope    Right heart enlargement    Personal history of breast cancer    Polyarthralgia    Breast cancer, female (CMS/HCC)    Malaise and fatigue       Review of Systems   Constitutional:  Negative for chills, fatigue and fever.   HENT:  Negative for congestion, rhinorrhea, sinus pain, sore throat and tinnitus.    Eyes:  Negative for discharge, redness and visual disturbance.   Respiratory:  Negative for cough, chest tightness, shortness of breath and wheezing.    Cardiovascular:  Negative for chest pain, palpitations and leg swelling.   Gastrointestinal:  Negative for abdominal pain, constipation, diarrhea, nausea and vomiting.   Endocrine: Negative for cold intolerance and heat intolerance.   Genitourinary:  Negative for flank pain, frequency and urgency.   Musculoskeletal:  Negative for back pain, gait problem and neck pain.   Skin:  Negative for rash and wound.   Neurological:  Positive for numbness. Negative for dizziness, tremors, syncope and headaches.   Hematological:  Does not bruise/bleed easily.   Psychiatric/Behavioral:  Negative for  confusion, sleep disturbance and suicidal ideas.        Past Medical History:   Diagnosis Date    Effusion of left knee 08/08/2023    Other specified postprocedural states     History of Papanicolaou smear    Personal history of colonic polyps     Personal history of colonic polyps    Personal history of other diseases of the circulatory system     History of hypertension    Personal history of other endocrine, nutritional and metabolic disease     History of hypothyroidism    Personal history of other endocrine, nutritional and metabolic disease     History of hyperglycemia    Personal history of other malignant neoplasm of skin     History of malignant neoplasm of skin    Personal history of other medical treatment     History of screening mammography       Past Surgical History:   Procedure Laterality Date    APPENDECTOMY      Appendectomy    CARDIAC SURGERY  2013    REPAIR OF HOLE IN HEART    COLONOSCOPY  11/18/2019    NORMAL; H/O COLONIC POLYPS IN PAST-REPEAT IN 5 YEARS    CT HEART CORONARY ANGIOGRAM  09/16/2021    CT HEART CORONARY ANGIOGRAM 9/16/2021 [facility] ANCILLARY LEGACY    HYSTERECTOMY  04/16/2018    [facility]    INCISIONAL BREAST BIOPSY      Incisional Breast Biopsy    LYMPHADENECTOMY      axillary    OTHER SURGICAL HISTORY  10/02/2018    Bunion Correction By Osborne Procedure    OTHER SURGICAL HISTORY  01/05/2023    Intra-articular corticosteroid injection    OTHER SURGICAL HISTORY  01/21/2022    Epidural steroid injection    OTHER SURGICAL HISTORY  12/08/2021    Radiofrequency ablation    OTHER SURGICAL HISTORY  08/23/2021    Medial branch block    OTHER SURGICAL HISTORY  09/01/2021    Carpal tunnel surgery    OTHER SURGICAL HISTORY  09/23/2021    Medial branch block    OTHER SURGICAL HISTORY  06/17/2022    Epidural steroid injection    OTHER SURGICAL HISTORY  11/17/2022    Epidural steroid injection    TONSILLECTOMY  1968    Tonsillectomy With Adenoidectomy    TUBAL LIGATION  1993     "Tubal Ligation       Family History   Problem Relation Name Age of Onset    Heart disease Mother      Heart failure Father      Hyperthyroidism Sister      Lung cancer Sister      Breast cancer Other GRANDPARENT        Social History     Tobacco Use    Smoking status: Never    Smokeless tobacco: Never   Vaping Use    Vaping Use: Never used   Substance Use Topics    Alcohol use: Yes    Drug use: Never       Allergies   Allergen Reactions    Bee Venom Protein (Honey Bee) Anaphylaxis    Zoledronic Acid Swelling       Current Outpatient Medications   Medication Sig Dispense Refill    aspirin 81 mg chewable tablet Chew 1 tablet (81 mg) once daily.      atorvastatin (Lipitor) 40 mg tablet Take 1 tablet (40 mg) by mouth once daily.      calcium citrate (Calcitrate) 200 mg (950 mg) tablet Take 1 tablet (200 mg) by mouth once daily.      carbidopa-levodopa (Parcopa)  mg disintegrating tablet Take 2 tablets by mouth 4 times a day.      cyanocobalamin (Vitamin B-12) 100 mcg tablet Take 1 tablet (100 mcg) by mouth once daily.      ergocalciferol (Vitamin D-2) 1.25 MG (34063 UT) capsule Take 1 capsule (50,000 Units) by mouth 1 (one) time per week. 4 capsule 11    gabapentin (Neurontin) 300 mg capsule Take 1 capsule (300 mg) by mouth 4 times a day.      letrozole (Femara) 2.5 mg tablet Take 1 tablet (2.5 mg total) by mouth once daily.      levothyroxine (Synthroid, Levoxyl) 50 mcg tablet Take 1 tablet (50 mcg) by mouth once daily.      midodrine (Proamatine) 5 mg tablet Take 1 tablet (5 mg) by mouth 3 times a day.      rasagiline (Azilect) 1 mg tablet Take 1 tablet (1 mg) by mouth once daily. 30 tablet 58     No current facility-administered medications for this visit.       Objective   /79   Pulse 78   Ht 1.753 m (5' 9\")   Wt 100 kg (221 lb)   BMI 32.64 kg/m²     Physical Exam  Constitutional:       Appearance: Normal appearance. She is obese.   HENT:      Head: Normocephalic.      Right Ear: External ear normal. "      Left Ear: External ear normal.      Nose: Nose normal. No congestion or rhinorrhea.      Mouth/Throat:      Mouth: Mucous membranes are moist.   Eyes:      Extraocular Movements: Extraocular movements intact.      Conjunctiva/sclera: Conjunctivae normal.      Pupils: Pupils are equal, round, and reactive to light.   Cardiovascular:      Rate and Rhythm: Normal rate and regular rhythm.      Pulses: Normal pulses.   Pulmonary:      Effort: Pulmonary effort is normal.      Breath sounds: Normal breath sounds.   Abdominal:      General: Bowel sounds are normal.      Palpations: Abdomen is soft.      Tenderness: There is no abdominal tenderness. There is no right CVA tenderness or left CVA tenderness.   Musculoskeletal:         General: No tenderness. Normal range of motion.      Cervical back: Normal range of motion and neck supple. No tenderness.   Skin:     General: Skin is warm and dry.   Neurological:      General: No focal deficit present.      Mental Status: She is alert and oriented to person, place, and time.   Psychiatric:         Mood and Affect: Mood normal.         Behavior: Behavior normal.       Testing   Reviewed labs on file and hops report again       Impression    MDM    1) COMPLEXITY: MORE THAN 1 STABLE CHRONIC CONDITION ADDRESSED  2)DATA: TESTS INTERPRETED AND OR ORDERED, TOOK INDEPENDENT HISTORY OR RECORDS REVIEWED  3)RISK: MODERATE RISK DUE TO NATURE OF MEDICAL CONDITIONS/COMORBIDITY OR MEDICATIONS ORDERED OR SURGICAL OR PROCEDURE REFERRAL, .       Reviewed labs and Testing on file   Patient to follow diet low in cholesterol, fat, and sodium.    Patient is advised to increase Exercise.  Patient is recommended to lose weight.  Reviewed Meds and discussed common side effects  Continue as directed   I question if the repeat labs were the kidney dipped again are secondary to still dealing with the pain and possibly on NSAIDs at that time?  I have advised we get updated labs for further eval    Discussed many common causes including but not limited to dehydration, infections, meds, DM, HTN, BORIS  Will aim to have labs done in the next month and follow up for further eval   Discussed weaning off some of her other meds and she should discuss with her specialists first.  I explained the need to make no more than 1 change /time to better monitoring   Patient is strongly advised to be compliant with recommendations.    Return to Clinic sooner if needed.  Patient denies further questions/concerns at this time     Assessment/Plan   Problem List Items Addressed This Visit       Parkinson's disease (CMS/[facility])    Orthostatic hypotension    Class 1 obesity due to excess calories with serious comorbidity and body mass index (BMI) of 32.0 to 32.9 in adult    Neuropathy    Dizziness    Benign essential hypertension    Acute renal insufficiency - Primary    Personal history of breast cancer    Malaise and fatigue        FU as before in sept

## 2023-08-17 NOTE — TELEPHONE ENCOUNTER
Patient was seen and her kidney concerns were addressed  She is set for repeat labs in the next couple weeks to recheck the kidney function as well   Thanks

## 2023-08-18 ENCOUNTER — PATIENT OUTREACH (OUTPATIENT)
Dept: PRIMARY CARE | Facility: CLINIC | Age: 63
End: 2023-08-18
Payer: MEDICARE

## 2023-09-06 LAB
CHOLESTEROL (MG/DL) IN SER/PLAS EXTERNAL: 132 MG/DL
CHOLESTEROL IN HDL (MG/DL) IN SER/PLAS EXTERNAL: 51 MG/DL
CHOLESTEROL IN LDL (MG/DL) IN SERUM OR PLASMA BY CALCULATION EXTERNAL: 63 MG/DL
ESTIMATED AVERAGE GLUCOSE FOR HBA1C EXTERNAL: 96 MG/DL
HEMOGLOBIN A1C/HEMOGLOBIN TOTAL IN BLOOD EXTERNAL: 5.6 %
TRIGLYCERIDE (MG/DL) IN SER/PLAS EXTERNAL: 98 MG/DL
VLDL EXTERNAL: 18 MG/DL

## 2023-09-07 VITALS — WEIGHT: 222.44 LBS | HEIGHT: 69 IN | BODY MASS INDEX: 32.95 KG/M2

## 2023-09-12 ENCOUNTER — OFFICE VISIT (OUTPATIENT)
Dept: PRIMARY CARE | Facility: CLINIC | Age: 63
End: 2023-09-12
Payer: MEDICARE

## 2023-09-12 VITALS
SYSTOLIC BLOOD PRESSURE: 114 MMHG | HEART RATE: 72 BPM | WEIGHT: 219 LBS | BODY MASS INDEX: 32.44 KG/M2 | DIASTOLIC BLOOD PRESSURE: 70 MMHG | HEIGHT: 69 IN

## 2023-09-12 DIAGNOSIS — Z00.00 WELCOME TO MEDICARE PREVENTIVE VISIT: Primary | ICD-10-CM

## 2023-09-12 DIAGNOSIS — Z00.00 ROUTINE GENERAL MEDICAL EXAMINATION AT HEALTH CARE FACILITY: ICD-10-CM

## 2023-09-12 DIAGNOSIS — E03.4 HYPOTHYROIDISM DUE TO ACQUIRED ATROPHY OF THYROID: ICD-10-CM

## 2023-09-12 DIAGNOSIS — E55.9 VITAMIN D DEFICIENCY: ICD-10-CM

## 2023-09-12 DIAGNOSIS — E78.2 HYPERCHOLESTEROLEMIA WITH HYPERTRIGLYCERIDEMIA: ICD-10-CM

## 2023-09-12 DIAGNOSIS — Z85.3 PERSONAL HISTORY OF BREAST CANCER: ICD-10-CM

## 2023-09-12 DIAGNOSIS — I10 BENIGN ESSENTIAL HYPERTENSION: ICD-10-CM

## 2023-09-12 DIAGNOSIS — Z23 NEED FOR INFLUENZA VACCINATION: ICD-10-CM

## 2023-09-12 DIAGNOSIS — Z71.89 ADVANCED CARE PLANNING/COUNSELING DISCUSSION: ICD-10-CM

## 2023-09-12 DIAGNOSIS — E66.09 CLASS 1 OBESITY DUE TO EXCESS CALORIES WITH SERIOUS COMORBIDITY AND BODY MASS INDEX (BMI) OF 32.0 TO 32.9 IN ADULT: ICD-10-CM

## 2023-09-12 DIAGNOSIS — G20.A1 PARKINSON'S DISEASE (MULTI): ICD-10-CM

## 2023-09-12 DIAGNOSIS — R73.02 GLUCOSE INTOLERANCE (IMPAIRED GLUCOSE TOLERANCE): ICD-10-CM

## 2023-09-12 DIAGNOSIS — E53.9 VITAMIN B DEFICIENCY: ICD-10-CM

## 2023-09-12 PROBLEM — R07.89 CHEST HEAVINESS: Status: RESOLVED | Noted: 2023-07-12 | Resolved: 2023-09-12

## 2023-09-12 PROBLEM — N28.9 ACUTE RENAL INSUFFICIENCY: Status: RESOLVED | Noted: 2023-07-12 | Resolved: 2023-09-12

## 2023-09-12 PROBLEM — R50.9 FEVER: Status: RESOLVED | Noted: 2020-05-15 | Resolved: 2023-09-12

## 2023-09-12 PROCEDURE — 90662 IIV NO PRSV INCREASED AG IM: CPT | Performed by: PHYSICIAN ASSISTANT

## 2023-09-12 PROCEDURE — 3008F BODY MASS INDEX DOCD: CPT | Performed by: PHYSICIAN ASSISTANT

## 2023-09-12 PROCEDURE — 1036F TOBACCO NON-USER: CPT | Performed by: PHYSICIAN ASSISTANT

## 2023-09-12 PROCEDURE — 3078F DIAST BP <80 MM HG: CPT | Performed by: PHYSICIAN ASSISTANT

## 2023-09-12 PROCEDURE — 1170F FXNL STATUS ASSESSED: CPT | Performed by: PHYSICIAN ASSISTANT

## 2023-09-12 PROCEDURE — 1160F RVW MEDS BY RX/DR IN RCRD: CPT | Performed by: PHYSICIAN ASSISTANT

## 2023-09-12 PROCEDURE — 3074F SYST BP LT 130 MM HG: CPT | Performed by: PHYSICIAN ASSISTANT

## 2023-09-12 PROCEDURE — G0402 INITIAL PREVENTIVE EXAM: HCPCS | Performed by: PHYSICIAN ASSISTANT

## 2023-09-12 PROCEDURE — 1159F MED LIST DOCD IN RCRD: CPT | Performed by: PHYSICIAN ASSISTANT

## 2023-09-12 PROCEDURE — G0008 ADMIN INFLUENZA VIRUS VAC: HCPCS | Performed by: PHYSICIAN ASSISTANT

## 2023-09-12 PROCEDURE — 99214 OFFICE O/P EST MOD 30 MIN: CPT | Performed by: PHYSICIAN ASSISTANT

## 2023-09-12 ASSESSMENT — ENCOUNTER SYMPTOMS
SHORTNESS OF BREATH: 0
EYE DISCHARGE: 0
DIZZINESS: 0
EYE REDNESS: 0
SORE THROAT: 0
VOMITING: 0
NECK PAIN: 0
RHINORRHEA: 0
CONFUSION: 0
WHEEZING: 0
FEVER: 0
FATIGUE: 0
SLEEP DISTURBANCE: 0
FREQUENCY: 0
CONSTIPATION: 0
CHEST TIGHTNESS: 0
HEADACHES: 0
PALPITATIONS: 0
BRUISES/BLEEDS EASILY: 0
CHILLS: 0
BACK PAIN: 0
FLANK PAIN: 0
WOUND: 0
TREMORS: 0
NAUSEA: 0
SINUS PAIN: 0
DIARRHEA: 0
NUMBNESS: 0
ABDOMINAL PAIN: 0
COUGH: 0

## 2023-09-12 ASSESSMENT — ACTIVITIES OF DAILY LIVING (ADL)
DOING_HOUSEWORK: INDEPENDENT
BATHING: INDEPENDENT
DRESSING: INDEPENDENT
GROCERY_SHOPPING: INDEPENDENT
MANAGING_FINANCES: INDEPENDENT
TAKING_MEDICATION: INDEPENDENT

## 2023-09-12 ASSESSMENT — PATIENT HEALTH QUESTIONNAIRE - PHQ9
1. LITTLE INTEREST OR PLEASURE IN DOING THINGS: NOT AT ALL
1. LITTLE INTEREST OR PLEASURE IN DOING THINGS: NOT AT ALL
SUM OF ALL RESPONSES TO PHQ9 QUESTIONS 1 AND 2: 0
2. FEELING DOWN, DEPRESSED OR HOPELESS: NOT AT ALL
2. FEELING DOWN, DEPRESSED OR HOPELESS: NOT AT ALL
SUM OF ALL RESPONSES TO PHQ9 QUESTIONS 1 AND 2: 0

## 2023-09-12 ASSESSMENT — VISUAL ACUITY
OD_CC: 20/25
OS_CC: 20/25

## 2023-09-12 NOTE — PROGRESS NOTES
Subjective   Reason for Visit: Nusrat Irizarry is an 63 y.o. female here for a Medicare Wellness visit.     Past Medical, Surgical, and Family History reviewed and updated in chart.    Reviewed all medications by prescribing practitioner or clinical pharmacist (such as prescriptions, OTCs, herbal therapies and supplements) and documented in the medical record.    Advanced Care Planning   Diagnosis, treatment and prognosis discussed with patient.  Patient has capacity to make his/her own decision.  Patient DOES NOT have a living will.  Patient is advised to set one up and bring a copy of this documenation for the chart. >16 min spent with patient counseling      HPI    Welcome to Medicare     Labs      med check   hypotension -stable   L shoulder/back pain -previously following with ortho  hypothyroid - on meds   vit D -  on meds   Hyperchol - on meds   PD - on meds   B12 - on supplement   Breast cancer - onc - transferred to      R itchy ear x 1 month       Preventative Testing   Mammo - april 2023  DEXA- april 2022 osteopenia   colonoscopy nov 2019 - dr chun - repeat in 5 years   PAP - 2018 - hx of hysterectomy   lung nodule - monitoring on imaging - July 2022- unchanged   Depression screen - PHQ2 NEG Sept 2023   Fall -NEG Sept 2023     Patient Care Team:  Lynda Ibarra PA-C as PCP - General  Leonid Benjamin MD as PCP - [employer] PCP  Izzy Culp CMA as Care Manager (Case Management)     Review of Systems   Constitutional:  Negative for chills, fatigue and fever.   HENT:  Negative for congestion, rhinorrhea, sinus pain, sore throat and tinnitus.         Itchy ears   Eyes:  Negative for discharge, redness and visual disturbance.   Respiratory:  Negative for cough, chest tightness, shortness of breath and wheezing.    Cardiovascular:  Negative for chest pain, palpitations and leg swelling.   Gastrointestinal:  Negative for abdominal pain, constipation, diarrhea, nausea and  "vomiting.   Endocrine: Negative for cold intolerance and heat intolerance.   Genitourinary:  Negative for flank pain, frequency and urgency.   Musculoskeletal:  Negative for back pain, gait problem and neck pain.   Skin:  Negative for rash and wound.   Neurological:  Negative for dizziness, tremors, syncope, numbness and headaches.   Hematological:  Does not bruise/bleed easily.   Psychiatric/Behavioral:  Negative for confusion, sleep disturbance and suicidal ideas.        Objective   Vitals:  /70   Pulse 72   Ht 1.753 m (5' 9\")   Wt 99.3 kg (219 lb)   BMI 32.34 kg/m²       Physical Exam  Vitals reviewed.   Constitutional:       Appearance: Normal appearance. She is obese.   HENT:      Head: Normocephalic.      Right Ear: Tympanic membrane, ear canal and external ear normal. There is no impacted cerumen.      Left Ear: Tympanic membrane, ear canal and external ear normal. There is no impacted cerumen.      Nose: Nose normal. No congestion or rhinorrhea.      Mouth/Throat:      Mouth: Mucous membranes are moist.   Eyes:      Extraocular Movements: Extraocular movements intact.      Conjunctiva/sclera: Conjunctivae normal.      Pupils: Pupils are equal, round, and reactive to light.   Cardiovascular:      Rate and Rhythm: Normal rate and regular rhythm.      Pulses: Normal pulses.   Pulmonary:      Effort: Pulmonary effort is normal.      Breath sounds: Normal breath sounds.   Abdominal:      General: Bowel sounds are normal.      Palpations: Abdomen is soft.      Tenderness: There is no abdominal tenderness. There is no right CVA tenderness or left CVA tenderness.   Musculoskeletal:         General: No tenderness. Normal range of motion.      Cervical back: Normal range of motion and neck supple. No tenderness.   Skin:     General: Skin is warm and dry.   Neurological:      General: No focal deficit present.      Mental Status: She is alert and oriented to person, place, and time.   Psychiatric:         " Mood and Affect: Mood normal.         Behavior: Behavior normal.       Testing   Labs at [facility] Sept 2023   TSH 4.1  Free t4 1.2  CBC - Wnl  CMP  -glucose 96  -rest approp  Lipid  -tchol 132  -TG 98  -HDL 51  -LDL 63  A1c 5.6  Vit D 51.5    Impression     MDM    1) COMPLEXITY: MORE THAN 1 STABLE CHRONIC CONDITION ADDRESSED  2)DATA: TESTS INTERPRETED AND OR ORDERED, TOOK INDEPENDENT HISTORY OR RECORDS REVIEWED  3)RISK: MODERATE RISK DUE TO NATURE OF MEDICAL CONDITIONS/COMORBIDITY OR MEDICATIONS ORDERED OR SURGICAL OR PROCEDURE REFERRAL, .     Reviewed labs and Testing on file   Patient to follow diet low in cholesterol, fat, and sodium.    Patient is advised to increase Exercise.  Patient is recommended to lose weight.  Reviewed Meds and discussed common side effects  Continue as directed   Patient is strongly advised to be compliant with recommendations.    Return to Clinic sooner if needed.  Patient denies further questions/concerns at this time     Flu shot given and tolerated well     Assessment/Plan   Problem List Items Addressed This Visit       Vitamin D deficiency    Relevant Orders    Vitamin D 25-Hydroxy,Total (for eval of Vitamin D levels)    Vitamin B deficiency    Relevant Orders    Vitamin B12    Parkinson's disease (CMS/[facility])    Class 1 obesity due to excess calories with serious comorbidity and body mass index (BMI) of 32.0 to 32.9 in adult    Glucose intolerance (impaired glucose tolerance)    Relevant Orders    Hemoglobin A1C    Hypothyroidism due to acquired atrophy of thyroid    Overview     >>OVERVIEW FOR HYPOTHYROIDISM WRITTEN ON 7/12/2023  1:51 PM BY MARIXA FLETCHER LPN    Last Assessment & Plan: Formatting of this note might be different from the original. stable         Relevant Orders    Thyroid Stimulating Hormone    Hypercholesterolemia with hypertriglyceridemia    Relevant Orders    CBC and Auto Differential    Comprehensive Metabolic Panel    Hemoglobin A1C    Lipid Panel    Thyroid  Stimulating Hormone    Magnesium    Vitamin B12    Vitamin D 25-Hydroxy,Total (for eval of Vitamin D levels)    Benign essential hypertension    Personal history of breast cancer    Overview     Formatting of this note might be different from the original. Right breast cancer dx'd 3/2020, s/p right lumpectomy and SLND S/p 2 rounds chemo for high risk Oncotype - 29, unable to tolerate further S/p XRT 8/24/2020 started on femara    >>OVERVIEW FOR BREAST CANCER, FEMALE (CMS/[facility]) WRITTEN ON 7/12/2023  1:45 PM BY MARIXA FLETCHER LPN    BREAST CANCER          Other Visit Diagnoses       Welcome to Medicare preventive visit    -  Primary    Need for influenza vaccination        Relevant Orders    Flu vaccine, quadrivalent, high-dose, preservative free, age 65y+ (FLUZONE)    Routine general medical examination at health care facility        Advanced care planning/counseling discussion                   FU in 1 year with medicare wellness and labs at [facility] fasting and med check

## 2023-10-02 ENCOUNTER — PHARMACY VISIT (OUTPATIENT)
Dept: PHARMACY | Facility: CLINIC | Age: 63
End: 2023-10-02

## 2023-10-02 PROCEDURE — RXMED WILLOW AMBULATORY MEDICATION CHARGE

## 2023-10-02 NOTE — OP NOTE
PROCEDURE DETAILS    Preoperative Diagnosis:  Spondylosis without myelopathy or radiculopathy, lumbar region, M47.816    Postoperative Diagnosis:  Spondylosis without myelopathy or radiculopathy, lumbar region, M47.816    Surgeon: Blade Granado  Resident/Fellow/Other Assistant: None of these were associated with this case    Procedure:  1. BILATERAL L4-S1 RFA    Anesthesia: Tyrone Arroyo  Estimated Blood Loss: 0  Findings: NA  Additional Details: The patient has a greater than 3-month history of severe bilateral axial lower back pain.  The patient has had 6 weeks of conservative management with stretching, exercise  therapy, and medications.  The patient is compliant with home exercises for this issue.  The imaging is notable for spondylosis and facet arthropathy at the levels in question.  The patient has had a previous radiofrequency ablation at the same levels  and obtained over 60% pain relief and functional improvement for 6 months.  The patient's examination is notable for bilateral lumbar paraspinal tenderness exacerbated with facet loading. The patient does not have an untreated stenosis or radiculopathy  and there is no other obvious cause for the pain.   The pain significantly impacts the patient's physical function and quality of life.  She cannot stand for 10 minutes or sit for 30 minutes due to the pain.  She cannot walk at all without severe pain.   She cannot sleep longer than 6 hours due to the pain.  The patient does not desire surgery        Operative Report:   Procedure: Bilateral lumbar medial branch radiofrequency ablation under fluoroscopic guidance of the medial branches of L3-L5 covering the L4-S1 facet joints  Diagnosis: Lumbar spondylosis  Anesthesia: MAC, sedation was needed due to the painful nature of the procedure  Complications: none    After informed consent was obtained, the patient was brought to the OR and placed in the prone position. The area in question was prepped  and draped in sterile fashion. An ipsilateral oblique fluoroscopic view of the lumbar spine was obtained, and after  0.25 ml of lidocaine 2% was administered into the skin at each site, a 20-gauge curved radiofrequency needle with a 10 mm active tip was inserted into the skin and advanced to the junction of the superior articular process and transverse processes of  the L4 and L5 vertebrae and to the sacral ala on the left side under intermittent fluoroscopic guidance. Proper needle position was confirmed by AP and lateral fluoroscopy. Sensory stimulation was conducted at 50 Hz at each site and was positive at or  below 0.7 V. Motor stimulation was conducted at 2 Hz at each site and was negative for radicular motion at 3 V. Once testing was complete, 1 ml of 2% lidocaine was injected through each needle and radiofrequency lesioning was conducted at 80 degrees Celsius  for 90 seconds each site. Two lesioning cycles were done per site. After lesioning was complete, 1 mL of 0.5% bupivacaine was injected through each needle. The 3 needles were removed. Bleeding was minimal. The procedure was repeated in the same manner  at the same levels on the opposite side. The patient tolerated the procedure well and was transferred to the recovery room in good condition.                          Attestation:   Note Completion:  Attending Attestation I performed the procedure without a resident         Electronic Signatures:  Blade Granado)  (Signed 23-Jun-2023 19:42)   Authored: Post-Operative Note, Chart Review, Note Completion      Last Updated: 23-Jun-2023 19:42 by Blade Granado)

## 2023-10-09 ENCOUNTER — HOSPITAL ENCOUNTER (OUTPATIENT)
Dept: RADIOLOGY | Facility: HOSPITAL | Age: 63
Discharge: HOME | End: 2023-10-09
Payer: MEDICARE

## 2023-10-09 DIAGNOSIS — M47.816 SPONDYLOSIS WITHOUT MYELOPATHY OR RADICULOPATHY, LUMBAR REGION: ICD-10-CM

## 2023-10-09 DIAGNOSIS — M54.16 RADICULOPATHY, LUMBAR REGION: ICD-10-CM

## 2023-10-09 DIAGNOSIS — G89.29 OTHER CHRONIC PAIN: ICD-10-CM

## 2023-10-09 PROCEDURE — 72148 MRI LUMBAR SPINE W/O DYE: CPT | Performed by: RADIOLOGY

## 2023-10-09 PROCEDURE — 72148 MRI LUMBAR SPINE W/O DYE: CPT

## 2023-10-10 ENCOUNTER — PATIENT OUTREACH (OUTPATIENT)
Dept: PRIMARY CARE | Facility: CLINIC | Age: 63
End: 2023-10-10
Payer: MEDICARE

## 2023-11-01 ENCOUNTER — PHARMACY VISIT (OUTPATIENT)
Dept: PHARMACY | Facility: CLINIC | Age: 63
End: 2023-11-01

## 2023-11-01 PROCEDURE — RXMED WILLOW AMBULATORY MEDICATION CHARGE

## 2023-11-09 ENCOUNTER — OFFICE VISIT (OUTPATIENT)
Dept: PAIN MEDICINE | Facility: CLINIC | Age: 63
End: 2023-11-09
Payer: MEDICARE

## 2023-11-09 VITALS
HEART RATE: 80 BPM | DIASTOLIC BLOOD PRESSURE: 76 MMHG | BODY MASS INDEX: 33.77 KG/M2 | RESPIRATION RATE: 16 BRPM | HEIGHT: 69 IN | SYSTOLIC BLOOD PRESSURE: 114 MMHG | WEIGHT: 228 LBS

## 2023-11-09 DIAGNOSIS — M54.50 CHRONIC BILATERAL LOW BACK PAIN WITHOUT SCIATICA: ICD-10-CM

## 2023-11-09 DIAGNOSIS — G89.29 CHRONIC BILATERAL LOW BACK PAIN WITHOUT SCIATICA: ICD-10-CM

## 2023-11-09 DIAGNOSIS — M47.816 LUMBAR ARTHROPATHY: Primary | ICD-10-CM

## 2023-11-09 DIAGNOSIS — M54.16 CHRONIC RADICULAR LOW BACK PAIN: ICD-10-CM

## 2023-11-09 DIAGNOSIS — G89.29 CHRONIC RADICULAR LOW BACK PAIN: ICD-10-CM

## 2023-11-09 DIAGNOSIS — M48.062 SPINAL STENOSIS, LUMBAR REGION WITH NEUROGENIC CLAUDICATION: ICD-10-CM

## 2023-11-09 DIAGNOSIS — M51.36 DISC DEGENERATION, LUMBAR: ICD-10-CM

## 2023-11-09 PROCEDURE — 99214 OFFICE O/P EST MOD 30 MIN: CPT | Performed by: PHYSICIAN ASSISTANT

## 2023-11-09 ASSESSMENT — ENCOUNTER SYMPTOMS
NEUROLOGICAL NEGATIVE: 1
ALLERGIC/IMMUNOLOGIC NEGATIVE: 1
HEMATOLOGIC/LYMPHATIC NEGATIVE: 1
CARDIOVASCULAR NEGATIVE: 1
ARTHRALGIAS: 1
CONSTITUTIONAL NEGATIVE: 1
BACK PAIN: 1
PSYCHIATRIC NEGATIVE: 1
RESPIRATORY NEGATIVE: 1
ENDOCRINE NEGATIVE: 1
GASTROINTESTINAL NEGATIVE: 1
EYES NEGATIVE: 1

## 2023-11-09 ASSESSMENT — PAIN SCALES - GENERAL
PAINLEVEL_OUTOF10: 8
PAINLEVEL: 8

## 2023-11-09 ASSESSMENT — PAIN - FUNCTIONAL ASSESSMENT: PAIN_FUNCTIONAL_ASSESSMENT: 0-10

## 2023-11-09 NOTE — PROGRESS NOTES
Subjective   Patient ID: Nusrat Irizarry is a 63 y.o. female who presents for Back Pain (Patient complains of pain in her lower back and bilateral hips.  She recently had a lumbar MRI.  Patient denied radiation further down her legs than her hips.  Patient states her pain is a constant 3-4/10.  She states that her pain increases with standing.  She states it can get up to a 8/10.  Patient denied numbness or tingling.).    ELIZABETH score 56.  Patient able to sit 0.5 hours.  Patient able to stand 10 minutes.  Patient able to walk 100 yards.    Patient is a 63-year-old female with a past medical history significant for sacroiliitis, lumbar spondylosis, lumbar stenosis, lumbar degenerative disc disease, lumbar disc bulge, lumbar neuritis and chronic back pain.   Patient underwent recent repeat lumbar MRI.  She continues to have mainly lower back pain.  She underwent previous bilateral L4-5 and L5-S1 facet RFA. This was done on 6/23/2023 and at this time is given her 75% relief.   Unfortunately, she is still having lower back pain, buttock pain. This affects her ambulatory status. This affects her quality of life. This affects her activities and affects her ability to do things she wants to do. She is very bothered by this.  She rates the discomfort a 4-8/10.  Previous conservative treatments have not given her any long-term relief. RFA did give her improvement of her axial low back pain but unfortunate, she is still having some back pain that she states is still in the lower back and she is overall very bothered by this.  It affects her ability to do the things she wants to do and wonders what her options are.  She does not want to be too aggressive though.        Review of Systems   Constitutional: Negative.    HENT: Negative.     Eyes: Negative.    Respiratory: Negative.     Cardiovascular: Negative.    Gastrointestinal: Negative.    Endocrine: Negative.    Genitourinary: Negative.    Musculoskeletal:  Positive for  arthralgias and back pain.   Skin: Negative.    Allergic/Immunologic: Negative.    Neurological: Negative.    Hematological: Negative.    Psychiatric/Behavioral: Negative.         Objective   Physical Exam  Vitals and nursing note reviewed.   Constitutional:       Appearance: Normal appearance. She is obese.   HENT:      Head: Normocephalic and atraumatic.      Right Ear: External ear normal.      Left Ear: External ear normal.      Nose: Nose normal.      Mouth/Throat:      Pharynx: Oropharynx is clear.   Eyes:      Conjunctiva/sclera: Conjunctivae normal.   Cardiovascular:      Rate and Rhythm: Normal rate and regular rhythm.      Pulses: Normal pulses.   Pulmonary:      Effort: Pulmonary effort is normal.      Breath sounds: Normal breath sounds.   Musculoskeletal:         General: Normal range of motion.      Cervical back: Normal range of motion.      Comments: Pain with compression of bilateral lumbar facets  Increased lower back pain with bilateral facet loading   Skin:     General: Skin is warm and dry.   Neurological:      General: No focal deficit present.      Mental Status: She is alert and oriented to person, place, and time. Mental status is at baseline.   Psychiatric:         Mood and Affect: Mood normal.         Behavior: Behavior normal.         Thought Content: Thought content normal.         Judgment: Judgment normal.         MR lumbar spine wo IV contrast  Status: Final result     PACS Images     Show images for MR lumbar spine wo IV contrast  Signed by    Signed Time Phone Pager   Ban Capellan DO 10/10/2023 09:34 [phone]     Exam Information    Status Exam Begun Exam Ended   Final 10/09/2023 17:19 10/09/2023 17:40     Study Result    Narrative & Impression   Interpreted By:  Ban Capellan,   STUDY:  MR LUMBAR SPINE WO IV CONTRAST;  10/9/2023 5:40 pm      INDICATION:  Signs/Symptoms: lower back and butt/leg pain  M47.816: Lumbar  arthropathy M54.16: Chronic radicular low back  pain G89.29: .      COMPARISON:  December 28, 2021      ACCESSION NUMBER(S):  [id number]      ORDERING CLINICIAN:  LM ALFARO      TECHNIQUE:  Sagittal T1, T2, STIR, axial T1 and T2 weighted images of the lumbar  spine were acquired.      FINDINGS:  Alignment: There is again subtle, grade 1 retrolisthesis of L5 on S1  and L3 on L4.      Vertebrae/Intervertebral Discs: There is again loss of height and  anterior wedging of L3. A Schmorl's node is again seen along the  superior endplate of T12. Otherwise, the vertebral body heights are  preserved. There are again mild degenerative endplate signal changes.  There is persistent disc desiccation and multilevel degenerative  endplate spurring. Loss of disc height remains most pronounced  posteriorly at L2-3 and L3-4.      Conus: The lower thoracic cord appears unremarkable. The conus  terminates at L2.      T12-L1:  There is minimal disc bulging as well as right greater than  left degenerative facet arthropathy. There is no significant  narrowing of the spinal canal or neuroforamina. L1-2: Mild facet and  ligamentum flavum hypertrophy and subtle disc bulging do not narrow  the spinal canal. The neuroforamina are patent. L2-3: Facet and  ligamentum flavum hypertrophy are again noted impressing on the  dorsal thecal sac. There is also prominent posterior epidural fat.  There is circumferential disc bulging and endplate spurring with  superimposed left paracentral/subarticular disc extrusion, new from  the previous examination. There is now effacement of the left lateral  recess and marked mass effect on the thecal sac with crowding of  nerve roots of the cauda equina. There is moderate overall narrowing  of the spinal canal . There is moderate left and mild right-sided  neuroforaminal narrowing due to an intraforaminal component of the  disc herniation which abuts and appears to contact the exiting left  L2 nerve root. L3-4: Facet and ligamentum flavum  hypertrophy,  prominent posterior epidural fat, and circumferential disc bulging  and endplate spurring are again noted. There is a broad-based disc  protrusion asymmetrically more pronounced to the left contributing to  narrowing of the left lateral recess and flattening of the ventral  thecal sac, new from the previous examination. However, the more  focal central disc protrusion seen on the previous examination is now  much less conspicuous. There is moderate to severe left and mild  right-sided neuroforaminal stenosis. Intraforaminal disc extrusion on  the left is again seen producing mass effect on the exiting left L3  nerve root. L4-5: Facet and ligamentum flavum hypertrophy and  circumferential disc bulging contribute to narrowing of the lateral  recesses, left greater than right with moderate, left greater than  right neuroforaminal narrowing. There is no significant narrowing of  the spinal canal. The findings are relatively similar from the  previous exam. L5-S1: Left greater than right facet and ligamentum  flavum hypertrophy and circumferential disc bulging and endplate  spurring are again demonstrated. There is a right subarticular disc  protrusion effacing the right lateral recess and contacting and  slightly displacing the right S1 nerve root, new from the prior  study. There is also a small left subarticular inferior disc  extrusion narrowing the left lateral recess and contacting the left  S1 nerve root. There is no significant narrowing of the spinal canal.  There is mild right and no significant left-sided neuroforaminal  stenosis.      There are degenerative changes of the sacroiliac joints.      There are partially imaged renal lesions favored to represent cysts.      IMPRESSION:      1. Left paracentral/subarticular and intraforaminal disc extrusion at  L2-3 effaces the left lateral recess and produces mass effect on the  thecal sac and crowding of nerve roots of the cauda equina.  L5-S1  subarticular disc herniations are new from the previous examination  and contribute to narrowing of the lateral recesses and at least  contact the S1 nerve roots with displacement of the 1 on the right.  2. Multilevel degenerative changes of the lumbar spine.  3. Remote appearing loss of height of L3.     Assessment/Plan   Diagnoses and all orders for this visit:  Lumbar arthropathy  Spinal stenosis, lumbar region with neurogenic claudication  Disc degeneration, lumbar  Chronic bilateral low back pain without sciatica  Chronic radicular low back pain    Patient is a 63-year-old female with a past medical history significant for chronic lower back pain, lumbar degenerative disease, lumbar disc herniation, lumbar stenosis and lumbosacral spondylosis.  Previous bilateral L4-S1 facet RFA gave her relief.  Unfortunately, she still has lower back pain.  Somewhat above her previous back pain.  We reviewed her MRI scan.  Based on her MRI findings, her pain pattern, and her failure to improve with previous conservative treatments I recommended to patient bilateral L2-3 and L3-4 medial branch block for diagnostic purposes.  This will be done under fluoroscopy if she gets significant short-term relief she may be a future candidate for RFA.  Procedure was discussed.  Risk and benefits were discussed.  Patient is agreeable.  She will follow-up 2 weeks after the injection for reevaluation.  We also discussed her medications.  She will need to hold her aspirin 6 days before.  She will also need to hold any over-the-counter supplements for 1 week before.

## 2023-11-15 ENCOUNTER — TELEPHONE (OUTPATIENT)
Dept: HEMATOLOGY/ONCOLOGY | Facility: CLINIC | Age: 63
End: 2023-11-15

## 2023-11-27 ENCOUNTER — PHARMACY VISIT (OUTPATIENT)
Dept: PHARMACY | Facility: CLINIC | Age: 63
End: 2023-11-27
Payer: MEDICARE

## 2023-11-27 DIAGNOSIS — R20.2 NUMBNESS AND TINGLING IN BOTH HANDS: Primary | ICD-10-CM

## 2023-11-27 DIAGNOSIS — G62.9 NEUROPATHY: ICD-10-CM

## 2023-11-27 DIAGNOSIS — R20.0 NUMBNESS AND TINGLING IN BOTH HANDS: Primary | ICD-10-CM

## 2023-11-27 PROCEDURE — RXMED WILLOW AMBULATORY MEDICATION CHARGE

## 2023-11-27 RX ORDER — GABAPENTIN 300 MG/1
300 CAPSULE ORAL 4 TIMES DAILY
Qty: 120 CAPSULE | Refills: 1 | Status: SHIPPED | OUTPATIENT
Start: 2023-11-27 | End: 2024-01-04 | Stop reason: SDUPTHER

## 2023-12-06 ENCOUNTER — TELEPHONE (OUTPATIENT)
Dept: PAIN MEDICINE | Facility: CLINIC | Age: 63
End: 2023-12-06
Payer: MEDICARE

## 2023-12-06 NOTE — TELEPHONE ENCOUNTER
Patient is scheduled to have a Bilat L2/3 and L3/4 MBB in the near future.  Is it acceptable for patient to hold Aspirin for 6 days before and 24 hours after injection?  Please advise.  Thanks!

## 2023-12-12 DIAGNOSIS — M47.816 LUMBAR SPONDYLOSIS: Primary | ICD-10-CM

## 2023-12-14 NOTE — TELEPHONE ENCOUNTER
Miya Phillips, JEFF Phillips, RN  Patient educated to hold Aspirin for 6 days prior to injection and 24 hours after.  Patient educated to hold vitamins and supplements for 7 days pre and 24 hours post.  Patient educated to hold NSAIDS for 4 days prior to injection.  Patient verbalized understanding and denied questions.  She would like to proceed with injection on 12/15/23.          Previous Messages       ----- Message -----  From: Bekah Degroot CMA  Sent: 11/27/2023   1:27 PM EST  To: Miya Phillips RN  Subject: FW: PROCEDURE                                    APPROVED AUTH#[id number] VALID 12/8/23-12/21/23  ----- Message -----  From: Bekah Degroot CMA  Sent: 11/27/2023   9:21 AM EST  To: Bekah Degroot CMA  Subject: PROCEDURE                                         bilateral L2-3 and L3-4 medial branch block for diagnostic purposes CPT 52418/71002 DX M47.816, PENDING CLINICAL REVIEW

## 2023-12-15 ENCOUNTER — HOSPITAL ENCOUNTER (OUTPATIENT)
Dept: OPERATING ROOM | Facility: HOSPITAL | Age: 63
Setting detail: OUTPATIENT SURGERY
Discharge: HOME | End: 2023-12-15
Payer: MEDICARE

## 2023-12-15 ENCOUNTER — HOSPITAL ENCOUNTER (OUTPATIENT)
Dept: RADIOLOGY | Facility: HOSPITAL | Age: 63
Setting detail: OUTPATIENT SURGERY
Discharge: HOME | End: 2023-12-15
Payer: MEDICARE

## 2023-12-15 VITALS
BODY MASS INDEX: 33.83 KG/M2 | RESPIRATION RATE: 16 BRPM | WEIGHT: 228.4 LBS | HEART RATE: 70 BPM | TEMPERATURE: 97.3 F | DIASTOLIC BLOOD PRESSURE: 90 MMHG | SYSTOLIC BLOOD PRESSURE: 162 MMHG | OXYGEN SATURATION: 98 % | HEIGHT: 69 IN

## 2023-12-15 DIAGNOSIS — M47.816 LUMBAR SPONDYLOSIS: ICD-10-CM

## 2023-12-15 PROCEDURE — 2500000004 HC RX 250 GENERAL PHARMACY W/ HCPCS (ALT 636 FOR OP/ED): Performed by: ANESTHESIOLOGY

## 2023-12-15 PROCEDURE — 2500000005 HC RX 250 GENERAL PHARMACY W/O HCPCS: Performed by: ANESTHESIOLOGY

## 2023-12-15 PROCEDURE — 64494 INJ PARAVERT F JNT L/S 2 LEV: CPT

## 2023-12-15 PROCEDURE — 64493 INJ PARAVERT F JNT L/S 1 LEV: CPT

## 2023-12-15 PROCEDURE — 77003 FLUOROGUIDE FOR SPINE INJECT: CPT

## 2023-12-15 RX ORDER — LIDOCAINE HYDROCHLORIDE 10 MG/ML
10 INJECTION, SOLUTION EPIDURAL; INFILTRATION; INTRACAUDAL; PERINEURAL ONCE
Status: COMPLETED | OUTPATIENT
Start: 2023-12-15 | End: 2023-12-15

## 2023-12-15 RX ORDER — BUPIVACAINE HYDROCHLORIDE 2.5 MG/ML
10 INJECTION, SOLUTION EPIDURAL; INFILTRATION; INTRACAUDAL ONCE
Status: COMPLETED | OUTPATIENT
Start: 2023-12-15 | End: 2023-12-15

## 2023-12-15 RX ADMIN — LIDOCAINE HYDROCHLORIDE 100 MG: 10 INJECTION, SOLUTION EPIDURAL; INFILTRATION; INTRACAUDAL; PERINEURAL at 13:31

## 2023-12-15 RX ADMIN — BUPIVACAINE HYDROCHLORIDE 25 MG: 2.5 INJECTION, SOLUTION EPIDURAL; INFILTRATION; INTRACAUDAL; PERINEURAL at 13:36

## 2023-12-15 ASSESSMENT — ENCOUNTER SYMPTOMS
ALLERGIC/IMMUNOLOGIC NEGATIVE: 1
ARTHRALGIAS: 0
EYES NEGATIVE: 1
ENDOCRINE NEGATIVE: 1
LIGHT-HEADEDNESS: 0
SHORTNESS OF BREATH: 0
BRUISES/BLEEDS EASILY: 0
COUGH: 0
PALPITATIONS: 0
CONSTITUTIONAL NEGATIVE: 1
DYSPHORIC MOOD: 0
ADENOPATHY: 0
SLEEP DISTURBANCE: 1
WHEEZING: 0
BACK PAIN: 1
FACIAL ASYMMETRY: 0
MYALGIAS: 1
NECK PAIN: 1
WEAKNESS: 0

## 2023-12-15 ASSESSMENT — PAIN - FUNCTIONAL ASSESSMENT
PAIN_FUNCTIONAL_ASSESSMENT: 0-10

## 2023-12-15 ASSESSMENT — COLUMBIA-SUICIDE SEVERITY RATING SCALE - C-SSRS
6. HAVE YOU EVER DONE ANYTHING, STARTED TO DO ANYTHING, OR PREPARED TO DO ANYTHING TO END YOUR LIFE?: NO
1. IN THE PAST MONTH, HAVE YOU WISHED YOU WERE DEAD OR WISHED YOU COULD GO TO SLEEP AND NOT WAKE UP?: NO
2. HAVE YOU ACTUALLY HAD ANY THOUGHTS OF KILLING YOURSELF?: NO

## 2023-12-15 ASSESSMENT — PAIN SCALES - GENERAL
PAINLEVEL_OUTOF10: 5 - MODERATE PAIN
PAINLEVEL_OUTOF10: 0 - NO PAIN
PAINLEVEL_OUTOF10: 0 - NO PAIN

## 2023-12-15 ASSESSMENT — PAIN DESCRIPTION - DESCRIPTORS: DESCRIPTORS: OTHER (COMMENT)

## 2023-12-15 NOTE — OP NOTE
* No procedures listed * Operative Note     Date: 12/15/2023  OR Location: [facility] ENDOSC1 OR    Name: Nusrat Irizarry, : 1960, Age: 63 y.o., MRN: [id number], Sex: female    Diagnosis  Lumbar spondylosis    Procedures  Bilateral L2-3 and L3-4 medial branch block with fluoroscopy    Surgeons   Nicolas Patel III    Resident/Fellow/Other Assistant:  * No surgeons found in log *    Procedure Summary  Anesthesia: Local ASA: ASA status not filed in the log.  Anesthesia Staff: Anesthesiologist: Nicolas Patel MD  Estimated Blood Loss: 0mL  Intra-op Medications: * Intraprocedure medication information is unavailable because the case start and end events have not been set *      Intraprocedure I/O Totals       None           Specimen: No specimens collected     Staff:   Circulator: Lane Madera RN  Scrub Person: Sunita Mcclure RN; Wale De La Torre RN         Drains and/or Catheters: * None in log *    Tourniquet Times:         Implants:     Findings: N/A    Indications: Nusrat Irizarry is an 63 y.o. female who is having bilateral L2-3 and L3-4 diagnostic medial branch block with fluoroscopy due to low back pain.  The patient was seen in the preoperative area. The risks, benefits, complications, treatment options, non-operative alternatives, expected recovery and outcomes were discussed with the patient. The possibilities of reaction to medication, pulmonary aspiration, injury to surrounding structures, bleeding, recurrent infection, the need for additional procedures, failure to diagnose a condition, and creating a complication requiring transfusion or operation were discussed with the patient. The patient concurred with the proposed plan, giving informed consent.  The site of surgery was properly noted/marked if necessary per policy. The patient has been actively warmed in preoperative area. Preoperative antibiotics are not indicated. Venous thrombosis prophylaxis are not indicated.    Procedure Details: The risks and  benefits of the bilateral L2-3 and L3-4 medial branch block with fluoroscopy were discussed at length and informed consent was obtained.  The patient was taken to the OR and placed on the procedure table with a pillow placed under the hips to minimize lumbar lordosis.  The patient voiced that she was in a comfortable position.  Using aseptic technique the lower lumbar region was prepped with ChloraPrep and draped in a standard fashion.  Using fluoroscopy with the assistance of the radiological technician the right and left L1-2, L2-3 and L3-4 levels were identified.  The skin underlying tissue was anesthetized with 10 mL of 1% lidocaine.  Using both anterior and oblique views with fluoroscopy, 3.5 inch number 22-gauge spinal needles were placed at each facet joint and 1.5 cc of 0.25% presented again was injected at each level without complication.  The needles were removed.  The patient tolerated procedure well was sent to recovery.  Postop and follow-up instructions were given to the patient.   Complications:  None; patient tolerated the procedure well.    Disposition: PACU - hemodynamically stable.  Condition: stable         Additional Details:     Attending Attestation:     *No primary surgeon found*

## 2023-12-15 NOTE — Clinical Note
Pt arrived to OR on cart. Transferred to OR table. Safety strap applied. Pt padded with pillows in prone  position. Pt prepped with Chloraprep. Bandaids applied to injection sites. Pt. Transferred to ACS on cart.

## 2023-12-15 NOTE — H&P
History Of Present Illness  Nusrat Irizarry is a 63 y.o. female presenting with low back pain radiating transversely..     Past Medical History  Past Medical History:   Diagnosis Date    Acute renal insufficiency 07/12/2023    Effusion of left knee 08/08/2023    Other specified postprocedural states     History of Papanicolaou smear    Personal history of colonic polyps     Personal history of colonic polyps    Personal history of other diseases of the circulatory system     History of hypertension    Personal history of other endocrine, nutritional and metabolic disease     History of hypothyroidism    Personal history of other endocrine, nutritional and metabolic disease     History of hyperglycemia    Personal history of other malignant neoplasm of skin     History of malignant neoplasm of skin    Personal history of other medical treatment     History of screening mammography       Surgical History  Past Surgical History:   Procedure Laterality Date    APPENDECTOMY      Appendectomy    CARDIAC SURGERY  2013    REPAIR OF HOLE IN HEART    COLONOSCOPY  11/18/2019    NORMAL; H/O COLONIC POLYPS IN PAST-REPEAT IN 5 YEARS    CT ANGIO CORONARY ART WITH HEARTFLOW IF SCORE >30%  09/16/2021    CT HEART CORONARY ANGIOGRAM 9/16/2021 [facility]CILLARY LEGACY    HYSTERECTOMY  04/16/2018    [facility]    INCISIONAL BREAST BIOPSY      Incisional Breast Biopsy    LYMPHADENECTOMY      axillary    OTHER SURGICAL HISTORY  10/02/2018    Bunion Correction By Osborne Procedure    OTHER SURGICAL HISTORY  01/05/2023    Intra-articular corticosteroid injection    OTHER SURGICAL HISTORY  01/21/2022    Epidural steroid injection    OTHER SURGICAL HISTORY  12/08/2021    Radiofrequency ablation    OTHER SURGICAL HISTORY  08/23/2021    Medial branch block    OTHER SURGICAL HISTORY  09/01/2021    Carpal tunnel surgery    OTHER SURGICAL HISTORY  09/23/2021    Medial branch block    OTHER SURGICAL HISTORY  06/17/2022    Epidural  steroid injection    OTHER SURGICAL HISTORY  11/17/2022    Epidural steroid injection    TONSILLECTOMY  1968    Tonsillectomy With Adenoidectomy    TUBAL LIGATION  1993    Tubal Ligation        Social History  She reports that she has never smoked. She has never used smokeless tobacco. She reports current alcohol use. She reports that she does not use drugs.    Family History  Family History   Problem Relation Name Age of Onset    Heart disease Mother      Heart failure Father      Hyperthyroidism Sister      Lung cancer Sister      Breast cancer Other GRANDPARENT         Allergies  Bee venom protein (honey bee) and Zoledronic acid    Review of Systems   Constitutional: Negative.    HENT: Negative.     Eyes: Negative.    Respiratory:  Negative for cough, shortness of breath and wheezing.    Cardiovascular:  Negative for chest pain, palpitations and leg swelling.   Endocrine: Negative.    Genitourinary: Negative.    Musculoskeletal:  Positive for back pain, myalgias and neck pain. Negative for arthralgias.   Skin: Negative.    Allergic/Immunologic: Negative.    Neurological:  Negative for facial asymmetry, weakness and light-headedness.   Hematological:  Negative for adenopathy. Does not bruise/bleed easily.   Psychiatric/Behavioral:  Positive for sleep disturbance. Negative for dysphoric mood and suicidal ideas.         Physical Exam  Constitutional:       General: She is not in acute distress.     Appearance: Normal appearance.   HENT:      Head: Normocephalic.      Mouth/Throat:      Mouth: Mucous membranes are moist.   Eyes:      Extraocular Movements: Extraocular movements intact.   Cardiovascular:      Rate and Rhythm: Normal rate and regular rhythm.      Pulses: Normal pulses.      Heart sounds: Normal heart sounds. No murmur heard.     No friction rub. No gallop.   Pulmonary:      Effort: Pulmonary effort is normal.      Breath sounds: Normal breath sounds. No wheezing, rhonchi or rales.   Abdominal:       "General: Abdomen is flat.      Palpations: Abdomen is soft.   Musculoskeletal:      Cervical back: Normal range of motion.      Right lower leg: No edema.      Left lower leg: No edema.   Lymphadenopathy:      Cervical: No cervical adenopathy.   Skin:     General: Skin is warm and dry.   Neurological:      General: No focal deficit present.      Mental Status: She is alert and oriented to person, place, and time. Mental status is at baseline.   Psychiatric:         Mood and Affect: Mood normal.         Behavior: Behavior normal.          Last Recorded Vitals  Blood pressure (!) 163/91, pulse 82, temperature 36.2 °C (97.2 °F), temperature source Temporal, resp. rate 18, height 1.75 m (5' 8.9\"), weight 104 kg (228 lb 6.3 oz), SpO2 96 %.             Assessment/Plan   Active Problems:  There are no active Hospital Problems.      The risks and benefits of the bilateral L2-3 L3-4 diagnostic medial branch blocks with fluoroscopy.  Was discussed at length with the patient. The records and imaging were reviewed by me. The patient understood that there was no guarantee that the procedure will alleviate their pain. All questions were answered prior to leaving for the OR.       This document was dictated and electronically signed using Dragon Naturally Speaking software. A reasonable attempt at proof reading was made to minimize errors. Please call with any questions.    Nicolas Patel MD    "

## 2023-12-28 PROCEDURE — RXMED WILLOW AMBULATORY MEDICATION CHARGE

## 2023-12-29 ENCOUNTER — PHARMACY VISIT (OUTPATIENT)
Dept: PHARMACY | Facility: CLINIC | Age: 63
End: 2023-12-29
Payer: MEDICARE

## 2023-12-29 ENCOUNTER — HOSPITAL ENCOUNTER (OUTPATIENT)
Dept: RADIOLOGY | Facility: HOSPITAL | Age: 63
Discharge: HOME | End: 2023-12-29
Payer: MEDICARE

## 2023-12-29 DIAGNOSIS — R91.1 SOLITARY PULMONARY NODULE: ICD-10-CM

## 2023-12-29 PROCEDURE — RXMED WILLOW AMBULATORY MEDICATION CHARGE

## 2023-12-29 PROCEDURE — 71250 CT THORAX DX C-: CPT

## 2023-12-29 PROCEDURE — 71250 CT THORAX DX C-: CPT | Performed by: RADIOLOGY

## 2024-01-04 ENCOUNTER — OFFICE VISIT (OUTPATIENT)
Dept: HEMATOLOGY/ONCOLOGY | Facility: CLINIC | Age: 64
End: 2024-01-04
Payer: MEDICARE

## 2024-01-04 VITALS
HEART RATE: 84 BPM | SYSTOLIC BLOOD PRESSURE: 135 MMHG | DIASTOLIC BLOOD PRESSURE: 85 MMHG | OXYGEN SATURATION: 98 % | HEIGHT: 69 IN | WEIGHT: 234 LBS | BODY MASS INDEX: 34.66 KG/M2 | RESPIRATION RATE: 18 BRPM

## 2024-01-04 DIAGNOSIS — R20.0 NUMBNESS AND TINGLING IN BOTH HANDS: ICD-10-CM

## 2024-01-04 DIAGNOSIS — R20.2 NUMBNESS AND TINGLING IN BOTH HANDS: ICD-10-CM

## 2024-01-04 DIAGNOSIS — Z12.39 BREAST CANCER SCREENING, HIGH RISK PATIENT: ICD-10-CM

## 2024-01-04 DIAGNOSIS — G62.9 NEUROPATHY: ICD-10-CM

## 2024-01-04 PROCEDURE — 3075F SYST BP GE 130 - 139MM HG: CPT

## 2024-01-04 PROCEDURE — 1126F AMNT PAIN NOTED NONE PRSNT: CPT

## 2024-01-04 PROCEDURE — 3008F BODY MASS INDEX DOCD: CPT

## 2024-01-04 PROCEDURE — 99213 OFFICE O/P EST LOW 20 MIN: CPT

## 2024-01-04 PROCEDURE — 1036F TOBACCO NON-USER: CPT

## 2024-01-04 PROCEDURE — 1159F MED LIST DOCD IN RCRD: CPT

## 2024-01-04 PROCEDURE — 3079F DIAST BP 80-89 MM HG: CPT

## 2024-01-04 RX ORDER — GABAPENTIN 300 MG/1
300 CAPSULE ORAL 4 TIMES DAILY
Qty: 120 CAPSULE | Refills: 1 | Status: SHIPPED | OUTPATIENT
Start: 2024-01-04 | End: 2024-02-15 | Stop reason: SDUPTHER

## 2024-01-04 ASSESSMENT — PAIN SCALES - GENERAL: PAINLEVEL: 0-NO PAIN

## 2024-01-04 NOTE — PROGRESS NOTES
Pt [facility]   Prep for DEXA given to pt.  Rtc for CNP follow up on 4/29 at 130  Reviewed AVS with patient- patient verbalizes understanding

## 2024-01-04 NOTE — PROGRESS NOTES
Patient ID: Misty Viera is a 63 y.o. female.    Subjective    HPI  History of Present Illness:      ID Statement:    MISTY VIERA is a 62 year old Female        Chief Complaint: Evaluation for breast cancer   Interval History:    Referred by STELLA Spencer     Reason for referral: breast cancer      HPI  61 year old woman with hx of obesity, HTN, angina pectoris, Parkinson’s disease, carpal tunnel syndrome, lumbar spondylosis, HL,  hypothyroidism, mitral valve prolapse neuropathy, pericardial effusion, ASD s/p closure, lung nodule on CT of the chest in June 2021 presenting for evaluation of breast cancer   Hx of hysterectomy / post menopausal   Breast cancer hx   March 2020: abnormal mammogram followed by US showing 1.6 cm mass in right breast at 5 o’clock , biopsy showed IDC   Surgery with Dr. Mo in [place] in march 2020: right lumpectomy and SLNBx: IDC, grade II, ER positive (99%), MS positive (5%), Her2 negative, final size 1.8 cm, margins negative  (closest margin 4 mm), DCIS present 1 LN with micrometasis deposit of 1 mm.   Oncotype score of 29  Received AC (adriamycin, cyclophosphamide) x 2 cycles in April/May 2020 with multiple complications so did not complete chemotherapy , she had low BP and a lot of side effects and dehydration, thrush   she is still on mid  Received radiation 7/14/20- 8/24/20   Started femara afterwards around Sep 2020  Last mmg march 2021- due march 2022   On zometa Q 6 m adjuvant, dexa due April 2022      last Zometa on 12/30/21      interval history - 1/4/24     Chronic Fatigue, remains about the same, ongoing since chemo treatment   continues have hot flashes, tolerable   remains taking Femara, tolerating well      Possible repeat back ablation, currently being evaluated   chronic joint pain, is currently on gabapentin 300mg 4 times daily, helps manage pain  follows with pain management      Balance remains slightly off, no new falls   No nose bleeds, since  cauterization   No abnormal bleeding or bruising issues      no fever or infections   No new breast concerns   No UTIs or URIs   No resting SOB   No cough  No chest pain or pressure   Chronic constipation, under control with bowel regimen of miralax, and two prunes nightly   Next colonoscopy due next year      No breast concerns, changes, no nipple discharge      PAST MEDICAL HISTORY:   obesity, HTN, angina pectoris, Parkinson’s disease, carpal tunnel syndrome, lumbar spondylosis, HL, hypothyroidism, mitral valve prolapse neuropathy, pericardial effusion, ASD s/p closure     SOCIAL HISTORY:   never smoker   no children   lives with her    she was  for insurance agency      FAMILY HISTORY:    maternal grandmother had breast cancer in her 60s   sister had lung cancer, non smoker   No other specific history of bleeding, clotting or malignant disorder in the family.       Objective    BSA: There is no height or weight on file to calculate BSA.  There were no vitals taken for this visit.     Physical Exam  Vitals and nursing note reviewed.   Constitutional:       Appearance: Normal appearance.   HENT:      Head: Normocephalic and atraumatic.      Mouth/Throat:      Mouth: Mucous membranes are moist.      Pharynx: Oropharynx is clear.   Eyes:      General: No scleral icterus.     Extraocular Movements: Extraocular movements intact.      Conjunctiva/sclera: Conjunctivae normal.   Cardiovascular:      Rate and Rhythm: Normal rate and regular rhythm.      Pulses: Normal pulses.      Heart sounds: Normal heart sounds.   Pulmonary:      Effort: Pulmonary effort is normal.      Breath sounds: Normal breath sounds.   Abdominal:      General: There is no distension.      Palpations: Abdomen is soft. There is no mass.      Tenderness: There is no abdominal tenderness.   Musculoskeletal:         General: Normal range of motion.      Cervical back: Normal range of motion.   Skin:     General: Skin is warm.    Neurological:      General: No focal deficit present.      Mental Status: She is alert and oriented to person, place, and time.   Psychiatric:         Mood and Affect: Mood normal.         Behavior: Behavior normal.         Thought Content: Thought content normal.         Judgment: Judgment normal.         Performance Status:  Asymptomatic      Assessment/Plan        Assessment:    1. Right breast cancer   [id number] right breast cancer, ER positive , WI weekly positive and her 2 negative   high risk oncotype of 29  s/p lumpectomy , 2 cycles of AC with poor tolerance and adjuvant radiation   on AI with femara since Aug/Sep 2020   continue for minimum 5 yrs   she is having body aches and joint pains and would like to try and wean down gabapentin as she doesn't know if it actually helping , however would like to continue same therapy now, will monitor and consider alternative AI or toro if worse pain   mmg due march 2022   dexa scan due April 2022 7/28/22- she still has myalgias and arthralgias but overall no major changes, plan to continue Femara   her dexa scan April 2022 showed osteopenia and decreased BMD, continue Zometa and we discussed switch to Tamoxifen if further BMD loss   MMG was benign in April, will schedule next yr   no new clinical concerns otherwise today      1/12/23- she is tolerating AI with Femara fairly  her arthralgias are controlled in general   plan to continue with same for 5 yrs at least   MMG schedule in April 2023, she was instructed to call for results      6/29/23: remains taking Femara, tolerating with no new concerns  continues to have fatigue, and body aches managed with gabapentin   discussed continuation with Femara vs. trialing a different AI,   pt desires to proceed with Femara at this time  completes 3 years in Aug/Sep., plan to continue for a duration of 5 yrs  continue surveillance with yearly MMG  MMG and Dexa scan will be due April 2024 1/4/24:  Completed 3 years on  Femara, remains having hot flashes, fatigue, myalgias/arthralgias - unchanged  Plan is continuation for a duration of 5 years  Patient has complete zometa  MMG and bone density are due in April 2. Bone health   continue Zometa adjuvant dosing 4 mg Q 6 m for 3 yrs , will give today    dexa scan April 2022 with osteropenia, repeat in 2 yrs     she had received ~ 1-2 yrs of Zometa at OSH so next dose will be last dose      6/29/23 will commence Zometa treatment today. patient meets parameters to treat today.    1/4/24- repeat bone density due in April 2024     3. Pericardial effusion hx   seems to have happened post her chemo   she followed cardiology for monitor and has a follow up in feb 2022      4. Lung nodule   CT scan showed fibrotic changes and stable sized nodule     6/29/23- schedule repeat CT scan prior to next follow-up     1/4/24 Repeat CT scan reported unchanged pulmonary nodules-   measuring up to 0.3 cm as compared with exams dating back to  06/10/2021. No new or enlarging discrete suspicious pulmonary nodule  Stable postradiation fibrosis within anterior right upper and middle lobes   No new acute cardiopulmonary process.  Stable postoperative changes of right breast lumpectomy and right  axillary lymph node dissection without evidence of disease recurrence.    Discussed repeating in 6 months, then can be repeated annually.         5. Fatigue   will check TSH, b12, folic acid, ferritin      RTC 6 m        YOHANA Ramachandran-CNP

## 2024-01-18 ENCOUNTER — OFFICE VISIT (OUTPATIENT)
Dept: PAIN MEDICINE | Facility: CLINIC | Age: 64
End: 2024-01-18
Payer: MEDICARE

## 2024-01-18 VITALS
BODY MASS INDEX: 34.81 KG/M2 | HEART RATE: 93 BPM | DIASTOLIC BLOOD PRESSURE: 69 MMHG | WEIGHT: 235 LBS | SYSTOLIC BLOOD PRESSURE: 112 MMHG

## 2024-01-18 DIAGNOSIS — G89.29 CHRONIC BILATERAL LOW BACK PAIN WITHOUT SCIATICA: ICD-10-CM

## 2024-01-18 DIAGNOSIS — M13.0 POLYARTHROPATHY: ICD-10-CM

## 2024-01-18 DIAGNOSIS — M51.36 DISC DEGENERATION, LUMBAR: ICD-10-CM

## 2024-01-18 DIAGNOSIS — M48.062 NEUROGENIC CLAUDICATION DUE TO LUMBAR SPINAL STENOSIS: ICD-10-CM

## 2024-01-18 DIAGNOSIS — M54.50 CHRONIC BILATERAL LOW BACK PAIN WITHOUT SCIATICA: ICD-10-CM

## 2024-01-18 DIAGNOSIS — M47.816 LUMBAR ARTHROPATHY: Primary | ICD-10-CM

## 2024-01-18 PROCEDURE — 99214 OFFICE O/P EST MOD 30 MIN: CPT | Performed by: PHYSICIAN ASSISTANT

## 2024-01-18 PROCEDURE — RXMED WILLOW AMBULATORY MEDICATION CHARGE

## 2024-01-18 RX ORDER — DIAZEPAM 5 MG/1
10 TABLET ORAL ONCE
Qty: 2 TABLET | Refills: 0 | Status: SHIPPED | OUTPATIENT
Start: 2024-01-18 | End: 2024-05-21 | Stop reason: ALTCHOICE

## 2024-01-18 ASSESSMENT — ENCOUNTER SYMPTOMS
RESPIRATORY NEGATIVE: 1
ALLERGIC/IMMUNOLOGIC NEGATIVE: 1
ARTHRALGIAS: 1
CONSTITUTIONAL NEGATIVE: 1
HEMATOLOGIC/LYMPHATIC NEGATIVE: 1
BACK PAIN: 1
PSYCHIATRIC NEGATIVE: 1
EYES NEGATIVE: 1
CARDIOVASCULAR NEGATIVE: 1
ENDOCRINE NEGATIVE: 1
GASTROINTESTINAL NEGATIVE: 1

## 2024-01-18 ASSESSMENT — COLUMBIA-SUICIDE SEVERITY RATING SCALE - C-SSRS
2. HAVE YOU ACTUALLY HAD ANY THOUGHTS OF KILLING YOURSELF?: NO
6. HAVE YOU EVER DONE ANYTHING, STARTED TO DO ANYTHING, OR PREPARED TO DO ANYTHING TO END YOUR LIFE?: NO
1. IN THE PAST MONTH, HAVE YOU WISHED YOU WERE DEAD OR WISHED YOU COULD GO TO SLEEP AND NOT WAKE UP?: NO

## 2024-01-18 ASSESSMENT — PAIN SCALES - GENERAL: PAINLEVEL: 4

## 2024-01-18 NOTE — PROGRESS NOTES
Subjective   Patient ID: Nusrat Irizarry is a 63 y.o. female who presents for Back Pain (FOLLOW UP BILATERAL L2-3 + L3-4 MBB LEFT SIDE 80% RELIEF, RIGHT SIDE 50% RELIEF, SHE HAS A COMPRESSION FEELING TO THE LOWER BACK AND AFTER STANDING TOO LONG SHE HAS SPASMS, SHE IS DOING YOGA WALL STRETCHING, HOME STRETCHING,). TAKING TYLENOL, ICE FOR RELIEF, SHE CAN WALK UP  YARDS AND STAND UP TO 10 MINUTES BUT PAIN INCREASES, SHE AMBULATES WITH A CANE OR WALKER IF THE PAIN IS REALLY BAD, BMI AGE, DEP-, SMOKING -, PAIN SCORE 4/10, ELIZABETH=62%  Patient is a 63-year-old female with a past medical history significant for sacroiliitis, lumbar spondylosis, lumbar stenosis, lumbar degenerative disc disease, lumbar disc bulge, lumbar neuritis and chronic back pain.   Patient underwent recent bilateral L2-3 and L3-4 medial branch blocks that she felt that she did not get any significant relief from.  She states that maybe 50% but she did not feel that it was overall beneficial.  Prior to that she underwent previous bilateral L4-5 and L5-S1 facet RFA. This was done on 6/23/2023 and got 75% relief but does feel that this pain has started to return  Unfortunately, she is still having lower back pain, buttock pain. This affects her ambulatory status. This affects her quality of life. This affects her activities and affects her ability to do things she wants to do.  She rates her discomfort a 4/10 .previous conservative treatments have not given her any long-term relief.         Review of Systems   Constitutional: Negative.    HENT: Negative.     Eyes: Negative.    Respiratory: Negative.     Cardiovascular: Negative.    Gastrointestinal: Negative.    Endocrine: Negative.    Genitourinary: Negative.    Musculoskeletal:  Positive for arthralgias, back pain and gait problem.   Skin: Negative.    Allergic/Immunologic: Negative.    Hematological: Negative.    Psychiatric/Behavioral: Negative.         Objective   Physical Exam  Vitals and  nursing note reviewed.   Constitutional:       Appearance: Normal appearance. She is obese.   HENT:      Head: Normocephalic and atraumatic.      Right Ear: External ear normal.      Left Ear: External ear normal.      Nose: Nose normal.      Mouth/Throat:      Pharynx: Oropharynx is clear.   Eyes:      Pupils: Pupils are equal, round, and reactive to light.   Cardiovascular:      Rate and Rhythm: Normal rate and regular rhythm.      Pulses: Normal pulses.   Pulmonary:      Effort: Pulmonary effort is normal.   Musculoskeletal:         General: Normal range of motion.      Cervical back: Normal range of motion.      Comments: Pain with compression of the bilateral lumbar facet joints  Increased lower back pain with bilateral facet loading  Normal strength   Skin:     General: Skin is warm and dry.   Neurological:      General: No focal deficit present.      Mental Status: She is alert and oriented to person, place, and time. Mental status is at baseline.   Psychiatric:         Mood and Affect: Mood normal.         Behavior: Behavior normal.         Thought Content: Thought content normal.         Judgment: Judgment normal.       MR lumbar spine wo IV contrast  Status: Final result     PACS Images     Show images for MR lumbar spine wo IV contrast  Signed by    Signed Time Phone Pager   Ban Capellan DO 10/10/2023 09:34 [phone]     Exam Information    Status Exam Begun Exam Ended   Final 10/09/2023 17:19 10/09/2023 17:40     Study Result    Narrative & Impression   Interpreted By:  Ban Capellan,   STUDY:  MR LUMBAR SPINE WO IV CONTRAST;  10/9/2023 5:40 pm      INDICATION:  Signs/Symptoms: lower back and butt/leg pain  M47.816: Lumbar  arthropathy M54.16: Chronic radicular low back pain G89.29: .      COMPARISON:  December 28, 2021      ACCESSION NUMBER(S):  [id number]      ORDERING CLINICIAN:  LM ALFARO      TECHNIQUE:  Sagittal T1, T2, STIR, axial T1 and T2 weighted images of the  lumbar  spine were acquired.      FINDINGS:  Alignment: There is again subtle, grade 1 retrolisthesis of L5 on S1  and L3 on L4.      Vertebrae/Intervertebral Discs: There is again loss of height and  anterior wedging of L3. A Schmorl's node is again seen along the  superior endplate of T12. Otherwise, the vertebral body heights are  preserved. There are again mild degenerative endplate signal changes.  There is persistent disc desiccation and multilevel degenerative  endplate spurring. Loss of disc height remains most pronounced  posteriorly at L2-3 and L3-4.      Conus: The lower thoracic cord appears unremarkable. The conus  terminates at L2.      T12-L1:  There is minimal disc bulging as well as right greater than  left degenerative facet arthropathy. There is no significant  narrowing of the spinal canal or neuroforamina. L1-2: Mild facet and  ligamentum flavum hypertrophy and subtle disc bulging do not narrow  the spinal canal. The neuroforamina are patent. L2-3: Facet and  ligamentum flavum hypertrophy are again noted impressing on the  dorsal thecal sac. There is also prominent posterior epidural fat.  There is circumferential disc bulging and endplate spurring with  superimposed left paracentral/subarticular disc extrusion, new from  the previous examination. There is now effacement of the left lateral  recess and marked mass effect on the thecal sac with crowding of  nerve roots of the cauda equina. There is moderate overall narrowing  of the spinal canal . There is moderate left and mild right-sided  neuroforaminal narrowing due to an intraforaminal component of the  disc herniation which abuts and appears to contact the exiting left  L2 nerve root. L3-4: Facet and ligamentum flavum hypertrophy,  prominent posterior epidural fat, and circumferential disc bulging  and endplate spurring are again noted. There is a broad-based disc  protrusion asymmetrically more pronounced to the left contributing  to  narrowing of the left lateral recess and flattening of the ventral  thecal sac, new from the previous examination. However, the more  focal central disc protrusion seen on the previous examination is now  much less conspicuous. There is moderate to severe left and mild  right-sided neuroforaminal stenosis. Intraforaminal disc extrusion on  the left is again seen producing mass effect on the exiting left L3  nerve root. L4-5: Facet and ligamentum flavum hypertrophy and  circumferential disc bulging contribute to narrowing of the lateral  recesses, left greater than right with moderate, left greater than  right neuroforaminal narrowing. There is no significant narrowing of  the spinal canal. The findings are relatively similar from the  previous exam. L5-S1: Left greater than right facet and ligamentum  flavum hypertrophy and circumferential disc bulging and endplate  spurring are again demonstrated. There is a right subarticular disc  protrusion effacing the right lateral recess and contacting and  slightly displacing the right S1 nerve root, new from the prior  study. There is also a small left subarticular inferior disc  extrusion narrowing the left lateral recess and contacting the left  S1 nerve root. There is no significant narrowing of the spinal canal.  There is mild right and no significant left-sided neuroforaminal  stenosis.      There are degenerative changes of the sacroiliac joints.      There are partially imaged renal lesions favored to represent cysts.      IMPRESSION:      1. Left paracentral/subarticular and intraforaminal disc extrusion at  L2-3 effaces the left lateral recess and produces mass effect on the  thecal sac and crowding of nerve roots of the cauda equina. L5-S1  subarticular disc herniations are new from the previous examination  and contribute to narrowing of the lateral recesses and at least  contact the S1 nerve roots with displacement of the 1 on the right.  2. Multilevel  degenerative changes of the lumbar spine.  3. Remote appearing loss of height of L3.          MACRO:  None      Signed by: Ban Capellan 10/10/2023 9:34 AM  Dictation workstation:   [id number]     Assessment/Plan   Diagnoses and all orders for this visit:  Lumbar arthropathy  Disc degeneration, lumbar  Polyarthropathy  Chronic bilateral low back pain without sciatica       Patient is a 63-year-old female with a past medical history significant for lumbar spondylosis, chronic lower back pain, lumbar stenosis.  She continues to have lower back pain.  No radicular symptoms.  We once again reviewed her MRI.  We discussed different options.  She does not think that she would like to have spine surgery but she would like to get set up to see a surgeon just to get her options.  We will facilitate this.  In regards to her lower back pain she feels that her pain has returned from before her previous L4-S1 facet RFA.  Since she did well with the previous bilateral L4-5 and L5-S1 facet RFA with 75% relief for over 6 months we discussed repeating this under fluoroscopy once again for long-term relief.  Diagnosis-M47.816-lumbar spondylosis.  It should be noted that bilateral L2-4 medial branch block did not give relief.  The bilateral L4-5 and L5-S1 facet RFA did give improvement.  We will facilitate to do this again with Valium.  How to use the Valium was discussed.  Medication hold including aspirin for 6 days before the procedure was discussed.  She will follow-up 3 weeks after the RFA for reevaluation.  We will also facilitate the referral to a spine surgeon.  OARRS reviewed.

## 2024-01-22 ENCOUNTER — TELEPHONE (OUTPATIENT)
Dept: PAIN MEDICINE | Facility: CLINIC | Age: 64
End: 2024-01-22
Payer: MEDICARE

## 2024-01-22 NOTE — TELEPHONE ENCOUNTER
I SUBMITTED AUTH ONLINE THROUGH [facility]  BILATERAL L4-5 AND L5-S1 RFA CPT 35739/34578 DX M47.816  PENDING CLINICAL REVIEW

## 2024-01-22 NOTE — TELEPHONE ENCOUNTER
Patient is planning to have a RFA with Dr. Patel in February.  Is it acceptable for patient to hold Aspirin for 6 days prior to this procedure and 24 hours after?  Please advise.  Thanks!

## 2024-01-23 ENCOUNTER — HOSPITAL ENCOUNTER (OUTPATIENT)
Dept: RADIOLOGY | Facility: CLINIC | Age: 64
Discharge: HOME | End: 2024-01-23
Payer: MEDICARE

## 2024-01-23 ENCOUNTER — APPOINTMENT (OUTPATIENT)
Dept: ORTHOPEDIC SURGERY | Facility: CLINIC | Age: 64
End: 2024-01-23
Payer: MEDICARE

## 2024-01-23 ENCOUNTER — OFFICE VISIT (OUTPATIENT)
Dept: ORTHOPEDIC SURGERY | Facility: CLINIC | Age: 64
End: 2024-01-23
Payer: MEDICARE

## 2024-01-23 DIAGNOSIS — M17.0 PRIMARY OSTEOARTHRITIS OF BOTH KNEES: ICD-10-CM

## 2024-01-23 DIAGNOSIS — M25.561 RIGHT KNEE PAIN, UNSPECIFIED CHRONICITY: Primary | ICD-10-CM

## 2024-01-23 DIAGNOSIS — M25.561 RIGHT KNEE PAIN, UNSPECIFIED CHRONICITY: ICD-10-CM

## 2024-01-23 PROCEDURE — 1159F MED LIST DOCD IN RCRD: CPT | Performed by: NURSE PRACTITIONER

## 2024-01-23 PROCEDURE — 20611 DRAIN/INJ JOINT/BURSA W/US: CPT | Performed by: NURSE PRACTITIONER

## 2024-01-23 PROCEDURE — 3008F BODY MASS INDEX DOCD: CPT | Performed by: NURSE PRACTITIONER

## 2024-01-23 PROCEDURE — 1036F TOBACCO NON-USER: CPT | Performed by: NURSE PRACTITIONER

## 2024-01-23 PROCEDURE — 99214 OFFICE O/P EST MOD 30 MIN: CPT | Performed by: NURSE PRACTITIONER

## 2024-01-23 PROCEDURE — 73564 X-RAY EXAM KNEE 4 OR MORE: CPT | Mod: RT

## 2024-01-23 PROCEDURE — 1160F RVW MEDS BY RX/DR IN RCRD: CPT | Performed by: NURSE PRACTITIONER

## 2024-01-23 PROCEDURE — 73564 X-RAY EXAM KNEE 4 OR MORE: CPT | Mod: RIGHT SIDE | Performed by: RADIOLOGY

## 2024-01-23 PROCEDURE — 1125F AMNT PAIN NOTED PAIN PRSNT: CPT | Performed by: NURSE PRACTITIONER

## 2024-01-23 RX ORDER — METHYLPREDNISOLONE 4 MG/1
TABLET ORAL
COMMUNITY
Start: 2022-11-08 | End: 2024-02-15 | Stop reason: WASHOUT

## 2024-01-23 RX ORDER — DIPHENHYDRAMINE HCL 25 MG
TABLET ORAL
COMMUNITY
End: 2024-03-26 | Stop reason: ALTCHOICE

## 2024-01-23 RX ORDER — TRIAMCINOLONE ACETONIDE 40 MG/ML
2.5 INJECTION, SUSPENSION INTRA-ARTICULAR; INTRAMUSCULAR
Status: COMPLETED | OUTPATIENT
Start: 2024-01-23 | End: 2024-01-23

## 2024-01-23 RX ORDER — OMEPRAZOLE 40 MG/1
CAPSULE, DELAYED RELEASE ORAL
COMMUNITY
End: 2024-02-15 | Stop reason: WASHOUT

## 2024-01-23 RX ORDER — DICLOFENAC SODIUM 10 MG/G
4 GEL TOPICAL 4 TIMES DAILY PRN
Qty: 100 G | Refills: 2 | Status: SHIPPED | OUTPATIENT
Start: 2024-01-23 | End: 2024-01-23

## 2024-01-23 RX ORDER — BIOTIN 10 MG
TABLET ORAL
COMMUNITY
Start: 2021-12-06

## 2024-01-23 RX ORDER — ONDANSETRON 8 MG/1
TABLET, ORALLY DISINTEGRATING ORAL 3 TIMES DAILY PRN
COMMUNITY
End: 2024-02-15 | Stop reason: WASHOUT

## 2024-01-23 RX ORDER — TRIAMCINOLONE ACETONIDE 1 MG/G
OINTMENT TOPICAL
COMMUNITY
Start: 2021-10-18 | End: 2024-02-15 | Stop reason: WASHOUT

## 2024-01-23 RX ORDER — AMOXICILLIN 500 MG/1
CAPSULE ORAL
COMMUNITY
Start: 2022-11-08 | End: 2024-02-15 | Stop reason: WASHOUT

## 2024-01-23 RX ORDER — FLUDROCORTISONE ACETATE 0.1 MG/1
TABLET ORAL
COMMUNITY
End: 2024-03-26 | Stop reason: ALTCHOICE

## 2024-01-23 RX ORDER — TRIAMCINOLONE ACETONIDE 40 MG/ML
40 INJECTION, SUSPENSION INTRA-ARTICULAR; INTRAMUSCULAR
Status: COMPLETED | OUTPATIENT
Start: 2024-01-23 | End: 2024-01-23

## 2024-01-23 RX ORDER — PREDNISONE 10 MG/1
TABLET ORAL
COMMUNITY
Start: 2021-05-20 | End: 2024-02-15 | Stop reason: WASHOUT

## 2024-01-23 RX ORDER — HYDROCODONE BITARTRATE AND ACETAMINOPHEN 5; 325 MG/1; MG/1
TABLET ORAL EVERY 4 HOURS PRN
COMMUNITY
Start: 2020-12-04 | End: 2024-02-15 | Stop reason: WASHOUT

## 2024-01-23 RX ORDER — DEXAMETHASONE 4 MG/1
TABLET ORAL
COMMUNITY
End: 2024-02-15 | Stop reason: WASHOUT

## 2024-01-23 RX ORDER — TIZANIDINE HYDROCHLORIDE 2 MG/1
CAPSULE, GELATIN COATED ORAL EVERY 8 HOURS
COMMUNITY
Start: 2021-05-20 | End: 2024-02-15 | Stop reason: WASHOUT

## 2024-01-23 RX ADMIN — TRIAMCINOLONE ACETONIDE 2.5 MG: 40 INJECTION, SUSPENSION INTRA-ARTICULAR; INTRAMUSCULAR at 13:40

## 2024-01-23 RX ADMIN — TRIAMCINOLONE ACETONIDE 40 MG: 40 INJECTION, SUSPENSION INTRA-ARTICULAR; INTRAMUSCULAR at 13:40

## 2024-01-23 ASSESSMENT — PAIN SCALES - GENERAL: PAINLEVEL_OUTOF10: 5 - MODERATE PAIN

## 2024-01-23 ASSESSMENT — ENCOUNTER SYMPTOMS
CONSTITUTIONAL NEGATIVE: 1
ENDOCRINE NEGATIVE: 1
HEMATOLOGIC/LYMPHATIC NEGATIVE: 1
KNEE DEFORMITY: 1
RESPIRATORY NEGATIVE: 1
CARDIOVASCULAR NEGATIVE: 1
ARTHRALGIAS: 1
KNEE SWELLING: 1
PSYCHIATRIC NEGATIVE: 1

## 2024-01-23 ASSESSMENT — PAIN DESCRIPTION - DESCRIPTORS: DESCRIPTORS: ACHING;DULL;SHOOTING;SHARP

## 2024-01-23 ASSESSMENT — PAIN - FUNCTIONAL ASSESSMENT: PAIN_FUNCTIONAL_ASSESSMENT: 0-10

## 2024-01-23 NOTE — PROGRESS NOTES
Subjective    Patient ID: Nusrat Irizarry is a 63 y.o. female.    Chief Complaint: Pain of the Left Knee and Pain of the Right Knee    Left Knee     Right Knee     Nusrat is a pleasant 62 yo presenting today for eval of bilat knee pain.  R>L, no inj or falls. Sx aggravated x 3 weeks  Sx aggravated with step use, stadning from sitting position, first thing upon rising and trying to ambulate.   States knees, ankle and feet felt warm and reddish-pinkish and puffiness which has resolved. Knees continue with achy pain  Tylenol for sx control with a little relief, ice helped better. Did not have eval at time of sx flare  No braces or wraps attempted  No known hx gout or autoimmune disorders  No hx injections to either knee     Review of Systems   Constitutional: Negative.    HENT: Negative.     Respiratory: Negative.     Cardiovascular: Negative.    Endocrine: Negative.    Musculoskeletal:  Positive for arthralgias and gait problem.   Skin: Negative.    Hematological: Negative.    Psychiatric/Behavioral: Negative.         Objective   Right Knee Exam     Tenderness   The patient is experiencing tenderness in the lateral joint line and medial joint line (Patellofemoral).    Range of Motion   Extension:  normal   Flexion:  100     Tests   Cristino:  Medial - negative Lateral - negative  Varus: negative Valgus: negative  Lachman:  Anterior - negative      Drawer:  Anterior - negative    Posterior - negative    Other   Erythema: absent  Sensation: normal  Pulse: present  Swelling: mild  Effusion: effusion present    Comments:  Positive bilateral antalgic gait with bilateral limping.  There is mild crepitus on range of motion exam palpated  Full ROM of distal joints with no sx aggravation.  Feet and ankles with pink, dry, intact skin.  There is no warmth or heat over either.  Distal motor and sensory intact, cap refill at 2 seconds.      Left Knee Exam     Tenderness   The patient is experiencing tenderness in the lateral joint  line (Patellofemoral).    Range of Motion   Extension:  normal   Flexion:  110     Tests   Cristino:  Medial - negative Lateral - negative  Varus: negative Valgus: negative  Lachman:  Anterior - negative      Drawer:  Anterior - negative     Posterior - negative    Other   Erythema: absent  Sensation: normal  Pulse: present  Swelling: none    Comments:  Positive bilateral antalgic gait with bilateral limping.  There is mild crepitus on range of motion exam palpated  Full ROM of distal joints with no sx aggravation.  Feet and ankles with pink, dry, intact skin.  There is no warmth or heat over either.  Distal motor and sensory intact, cap refill at 2 seconds.            Image Results:  L knee xray 7/9/2023    FINDINGS:  LEFT KNEE-AP, LATERAL, INTERNAL AND EXTERNAL OBLIQUE VIEWS  Mild degenerative changes are present, with slight narrowing of the  lateral tibiofemoral joint space, mildly prominent tibial spines, and  small osteophytes from the posterior margin of the patella. No loose  bodies. No fracture. A small suprapatellar effusion is present which  is increased from 05/16/2023. The articular surfaces are intact.     IMPRESSION:  Slight increase in suprapatellar effusion since 05/16/2023.  Mild degenerative changes of the knee.     R knee imaging obtained date of visit  Independent review:   Patient ID: Nusrat Irizarry is a 63 y.o. female.    L Inj/Asp: bilateral knee on 1/23/2024 1:40 PM  Indications: pain and joint swelling  Details: 22 G needle, ultrasound-guided superolateral approach  Medications (Right): 40 mg triamcinolone acetonide 40 mg/mL  Medications (Left): 2.5 mg triamcinolone acetonide 40 mg/mL  Outcome: tolerated well, no immediate complications    We discussed risk and benefits of cortisone injection, patient wishes to proceed via verbal consent.  Skin was prepped with Betadine, Vapocoolant spray and alcohol.  Direct visualization using high-frequency linear probe of ultrasound was utilized to  administer the injection of 40 mg Kenalog, 3 cc 1% lidocaine and 3 cc of bupivacaine to each knee.  Patient tolerated injection well lidocaine suppression, bandage applied.  Images were saved under MRN number into the PACS system.   Procedure, treatment alternatives, risks and benefits explained, specific risks discussed. Consent was given by the patient. Immediately prior to procedure a time out was called to verify the correct patient, procedure, equipment, support staff and site/side marked as required. Patient was prepped and draped in the usual sterile fashion.           Assessment/Plan   Encounter Diagnoses:  Problem List Items Addressed This Visit             ICD-10-CM    Primary osteoarthritis of both knees M17.0     We reviewed conservative measures for knee OA symptom control.  Patient to continue to take Tylenol per package directions, diclofenac gel up to 4 times daily.  Tolerated cortisone injections well with + lidocaine suppression.  Home exercises for knee OA were reviewed and patient encouraged to begin in approximately 1 week and advance as tolerated.  Plan will be to follow-up here in approximately 2-3 months, sooner for changes or concerns.  We discussed seeking evaluation in the event she has multiple joints flare swelling and redness in the future either with PCP or here in Ortho.  Discussed benefits of formal PT, at this time patient would like to try with home exercises and if she wishes to try formal PT if symptoms are not improving.         Relevant Orders    Point of Care Ultrasound (Completed)     Other Visit Diagnoses         Codes    Right knee pain, unspecified chronicity    -  Primary M25.561    Relevant Orders    XR knee right 4+ views

## 2024-01-23 NOTE — ASSESSMENT & PLAN NOTE
We reviewed conservative measures for knee OA symptom control.  Patient to continue to take Tylenol per package directions, diclofenac gel up to 4 times daily.  Tolerated cortisone injections well with + lidocaine suppression.  Home exercises for knee OA were reviewed and patient encouraged to begin in approximately 1 week and advance as tolerated.  Plan will be to follow-up here in approximately 2-3 months, sooner for changes or concerns.  We discussed seeking evaluation in the event she has multiple joints flare swelling and redness in the future either with PCP or here in Ortho.  Discussed benefits of formal PT, at this time patient would like to try with home exercises and if she wishes to try formal PT if symptoms are not improving.

## 2024-01-31 ENCOUNTER — PHARMACY VISIT (OUTPATIENT)
Dept: PHARMACY | Facility: CLINIC | Age: 64
End: 2024-01-31
Payer: MEDICARE

## 2024-01-31 PROCEDURE — RXMED WILLOW AMBULATORY MEDICATION CHARGE

## 2024-02-01 NOTE — TELEPHONE ENCOUNTER
I left a message for patient to contact me back, her insurance does not want to cover the left rfa, she had a bilateral rfa 6/23 and will be having the right side 2/24 which would count as 2 rfa's in a 12 month period, she would need to wait until June for the left rfa. We may be able to do bilateral with another provider but cannot confirm when he is returning yet.

## 2024-02-02 NOTE — TELEPHONE ENCOUNTER
PATIENT HAD OV WITH NEUROSURGERY IRENA OLIVERA YESTERDAY, I SCANNED THE NOTE TO CHART AND PLACED IN LM'S BIN FOR REVIEW  HE IS RECOMMENDING A LUMBAR EPIDURAL HE ALSO REQUESTED THE MRI FOR REVIEW  PATIENT CALLED IN STATING SHE WILL KEEP HERSELF ON THE SCHEDULE FOR HER RIGHT RFA ONLY UNTIL LM REVIEWS THE NOTE FROM IRENA, SHE REALLY WAS UNSURE OF WHAT SHE SHOULD DO SINCE INSURANCE DENIED THE LEFT SIDE RFA

## 2024-02-12 DIAGNOSIS — G62.9 NEUROPATHY: ICD-10-CM

## 2024-02-12 DIAGNOSIS — Z85.3 PERSONAL HISTORY OF BREAST CANCER: Primary | ICD-10-CM

## 2024-02-12 PROCEDURE — RXMED WILLOW AMBULATORY MEDICATION CHARGE

## 2024-02-12 RX ORDER — LETROZOLE 2.5 MG/1
2.5 TABLET, FILM COATED ORAL DAILY
Qty: 90 TABLET | Refills: 2 | Status: SHIPPED | OUTPATIENT
Start: 2024-02-12

## 2024-02-14 ENCOUNTER — TELEPHONE (OUTPATIENT)
Dept: HEMATOLOGY/ONCOLOGY | Facility: CLINIC | Age: 64
End: 2024-02-14
Payer: MEDICARE

## 2024-02-14 ENCOUNTER — PHARMACY VISIT (OUTPATIENT)
Dept: PHARMACY | Facility: CLINIC | Age: 64
End: 2024-02-14
Payer: MEDICARE

## 2024-02-14 DIAGNOSIS — E03.4 HYPOTHYROIDISM DUE TO ACQUIRED ATROPHY OF THYROID: ICD-10-CM

## 2024-02-14 RX ORDER — LEVOTHYROXINE SODIUM 50 UG/1
50 TABLET ORAL DAILY
Qty: 90 TABLET | Refills: 3 | Status: SHIPPED | OUTPATIENT
Start: 2024-02-14

## 2024-02-15 ENCOUNTER — PHARMACY VISIT (OUTPATIENT)
Dept: PHARMACY | Facility: CLINIC | Age: 64
End: 2024-02-15
Payer: MEDICARE

## 2024-02-15 ENCOUNTER — OFFICE VISIT (OUTPATIENT)
Dept: PAIN MEDICINE | Facility: CLINIC | Age: 64
End: 2024-02-15
Payer: MEDICARE

## 2024-02-15 VITALS
WEIGHT: 238 LBS | BODY MASS INDEX: 35.25 KG/M2 | HEART RATE: 80 BPM | DIASTOLIC BLOOD PRESSURE: 72 MMHG | SYSTOLIC BLOOD PRESSURE: 109 MMHG

## 2024-02-15 DIAGNOSIS — R20.2 NUMBNESS AND TINGLING IN BOTH HANDS: ICD-10-CM

## 2024-02-15 DIAGNOSIS — G62.9 NEUROPATHY: ICD-10-CM

## 2024-02-15 DIAGNOSIS — M48.062 SPINAL STENOSIS, LUMBAR REGION WITH NEUROGENIC CLAUDICATION: ICD-10-CM

## 2024-02-15 DIAGNOSIS — M54.16 CHRONIC RADICULAR LOW BACK PAIN: ICD-10-CM

## 2024-02-15 DIAGNOSIS — M54.50 CHRONIC BILATERAL LOW BACK PAIN, UNSPECIFIED WHETHER SCIATICA PRESENT: ICD-10-CM

## 2024-02-15 DIAGNOSIS — G89.29 CHRONIC RADICULAR LOW BACK PAIN: ICD-10-CM

## 2024-02-15 DIAGNOSIS — G89.29 CHRONIC BILATERAL LOW BACK PAIN, UNSPECIFIED WHETHER SCIATICA PRESENT: ICD-10-CM

## 2024-02-15 DIAGNOSIS — R20.0 NUMBNESS AND TINGLING IN BOTH HANDS: ICD-10-CM

## 2024-02-15 DIAGNOSIS — M54.16 LUMBAR RADICULOPATHY: Primary | ICD-10-CM

## 2024-02-15 PROCEDURE — 99214 OFFICE O/P EST MOD 30 MIN: CPT | Performed by: PHYSICIAN ASSISTANT

## 2024-02-15 PROCEDURE — RXMED WILLOW AMBULATORY MEDICATION CHARGE

## 2024-02-15 RX ORDER — GABAPENTIN 300 MG/1
300 CAPSULE ORAL 4 TIMES DAILY
Qty: 120 CAPSULE | Refills: 1 | Status: SHIPPED | OUTPATIENT
Start: 2024-02-15 | End: 2024-04-13 | Stop reason: SDUPTHER

## 2024-02-15 ASSESSMENT — ENCOUNTER SYMPTOMS
EYES NEGATIVE: 1
CONSTITUTIONAL NEGATIVE: 1
HEMATOLOGIC/LYMPHATIC NEGATIVE: 1
PSYCHIATRIC NEGATIVE: 1
ARTHRALGIAS: 1
GASTROINTESTINAL NEGATIVE: 1
CARDIOVASCULAR NEGATIVE: 1
RESPIRATORY NEGATIVE: 1
BACK PAIN: 1
ALLERGIC/IMMUNOLOGIC NEGATIVE: 1
ENDOCRINE NEGATIVE: 1

## 2024-02-15 ASSESSMENT — COLUMBIA-SUICIDE SEVERITY RATING SCALE - C-SSRS
1. IN THE PAST MONTH, HAVE YOU WISHED YOU WERE DEAD OR WISHED YOU COULD GO TO SLEEP AND NOT WAKE UP?: NO
6. HAVE YOU EVER DONE ANYTHING, STARTED TO DO ANYTHING, OR PREPARED TO DO ANYTHING TO END YOUR LIFE?: NO
2. HAVE YOU ACTUALLY HAD ANY THOUGHTS OF KILLING YOURSELF?: NO

## 2024-02-15 NOTE — PROGRESS NOTES
Subjective   Patient ID: Nusrat Irizarry is a 63 y.o. female who presents for Back Pain (FOLLOW UP CONSULT FROM IRENA OLIVERA, HE RECOMMENDS DISCUSSING EPIDURAL OVER RFA, ONGOING BILATERAL LOWER BACK PAIN THAT FEELS LIKE IT IS BEING COMPRESSED, ).SHE REPOSITIONS AND LAYS FLAT STRETCHED OUT TO ALLEVIATE THE PAIN, SHE WILL TAKE TYLENOL, ICE FOR RELIEF, PAIN SCORE 4/10, ELIZABETH=58%, FALLS NO, ETOH NO    Bekah Degroot, MARGARITA 02/15/24 9:52 AM     Patient is a 63-year-old female with a past medical history significant for sacroiliitis, lumbar spondylosis, lumbar stenosis, lumbar degenerative disc disease, lumbar disc bulge, lumbar neuritis and chronic back pain.     Patient recently saw surgeon.  He did not recommend surgery but rather a possible epidural.  Patient underwent most recently bilateral L2-3 and L3-4 medial branch blocks that she felt that she did not get any significant relief from.  She states that maybe 50% but she did not feel that it was overall beneficial.  Prior to that she underwent previous bilateral L4-5 and L5-S1 facet RFA. This was done on 6/23/2023 and got 75% relief but does feel that this pain has started to return    Unfortunately, she is still having lower back pain, buttock pain.  she now also admits to leg tiredness and heaviness.  She states that the more she walks the more her legs get fatigued.  She rates this a 4/10 but states that the more she does it can get worse.  Historically, conservative treatments including therapy have failed.          Review of Systems   Constitutional: Negative.    HENT: Negative.     Eyes: Negative.    Respiratory: Negative.     Cardiovascular: Negative.    Gastrointestinal: Negative.    Endocrine: Negative.    Genitourinary: Negative.    Musculoskeletal:  Positive for arthralgias, back pain and gait problem.   Skin: Negative.    Allergic/Immunologic: Negative.    Hematological: Negative.    Psychiatric/Behavioral: Negative.         Objective   Physical  Exam  Vitals and nursing note reviewed.   Constitutional:       Appearance: Normal appearance. She is obese.   HENT:      Head: Normocephalic and atraumatic.      Right Ear: External ear normal.      Left Ear: External ear normal.      Nose: Nose normal.      Mouth/Throat:      Pharynx: Oropharynx is clear.   Eyes:      Pupils: Pupils are equal, round, and reactive to light.   Cardiovascular:      Rate and Rhythm: Normal rate and regular rhythm.      Pulses: Normal pulses.      Heart sounds: Normal heart sounds.   Pulmonary:      Effort: Pulmonary effort is normal.      Breath sounds: Normal breath sounds.   Musculoskeletal:         General: Normal range of motion.      Cervical back: Normal range of motion.      Comments: 5/5 lower extremity strength other than bilateral hip flexion, ADF and EHL 5 -/5   Skin:     General: Skin is warm and dry.   Neurological:      General: No focal deficit present.      Mental Status: She is alert and oriented to person, place, and time. Mental status is at baseline.   Psychiatric:         Mood and Affect: Mood normal.         Behavior: Behavior normal.         Thought Content: Thought content normal.         Judgment: Judgment normal.         Imaging Results  - documented in this encounter  XR Lumbar Spine Standard with Flex/Ext 4+ Views (01/31/2024 11:17 AM EST)  Imaging Results - XR Lumbar Spine Standard with Flex/Ext 4+ Views (01/31/2024 11:17 AM EST)  Anatomical Region Laterality Modality   T-spine, L-spine, Pelvis   Digital Radiography     Imaging Results - XR Lumbar Spine Standard with Flex/Ext 4+ Views (01/31/2024 11:17 AM EST)  Specimen (Source) Anatomical Location / Laterality Collection Method / Volume Collection Time Received Time         02/01/2024 7:37 AM EST       Imaging Results - XR Lumbar Spine Standard with Flex/Ext 4+ Views (01/31/2024 11:17 AM EST)  Impressions   02/01/2024 8:11 AM EST     1. Stable L3 superior endplate compression fracture.  2. Multilevel  spinal degenerative changes with a slight degenerative retrolisthesis L3 on L4.  3. No evidence of dynamic instability.  JAR/hb          Workstation ID:   [id number]      Imaging Results - XR Lumbar Spine Standard with Flex/Ext 4+ Views (01/31/2024 11:17 AM EST)  Narrative   02/01/2024 8:11 AM EST   EXAMINATION:  XR LUMBAR SPINE STANDARD WITH FLEX/EXT 4+ VIEWS  1/31/2024 11:15 am    HISTORY:  ORDERING SYSTEM PROVIDED HISTORY:  Back pain,    TECHNOLOGIST PROVIDED HISTORY:  Illness/Other  Reason for exam: low back pain  Cancer History: u  Surgery, RadiationHistory: u  Encounter Type: Initial  Additional signs and symptoms: .    ORDERING SYSTEM PROVIDED DIAGNOSIS CODES:  M54.50 Low back pain, unspecified back pain laterality, unspecified chronicity, unspecified whether sciatica present      COMPARISON:  No prior lumbar spine series.  Correlation made with MR lumbar spine October 9, 2023.    FINDINGS:  Five views of the lumbar spine were obtained including lateral flexion and extension.    There is a superior endplate compression fracture of the L3 vertebrae which appears to be grossly stable from the prior MRI.  The remaining lumbar vertebrae are maintained in height.  Multilevel spinal degenerative changes are present.  There is a slight retrolisthesis L3 on L4.  No evidence of dynamic instability with lateral flexion and extension.     MR lumbar spine wo IV contrast  Status: Final result     PACS Images     Show images for MR lumbar spine wo IV contrast  Signed by    Signed Time Phone Pager   Ban Capellan DO 10/10/2023 09:34 [phone]     Exam Information    Status Exam Begun Exam Ended   Final 10/09/2023 17:19 10/09/2023 17:40     Study Result    Narrative & Impression   Interpreted By:  Ban Capellan,   STUDY:  MR LUMBAR SPINE WO IV CONTRAST;  10/9/2023 5:40 pm      INDICATION:  Signs/Symptoms: lower back and butt/leg pain  M47.816: Lumbar  arthropathy M54.16: Chronic radicular low back pain G89.29:  .      COMPARISON:  December 28, 2021      ACCESSION NUMBER(S):  [id number]      ORDERING CLINICIAN:  LM ALFARO      TECHNIQUE:  Sagittal T1, T2, STIR, axial T1 and T2 weighted images of the lumbar  spine were acquired.      FINDINGS:  Alignment: There is again subtle, grade 1 retrolisthesis of L5 on S1  and L3 on L4.      Vertebrae/Intervertebral Discs: There is again loss of height and  anterior wedging of L3. A Schmorl's node is again seen along the  superior endplate of T12. Otherwise, the vertebral body heights are  preserved. There are again mild degenerative endplate signal changes.  There is persistent disc desiccation and multilevel degenerative  endplate spurring. Loss of disc height remains most pronounced  posteriorly at L2-3 and L3-4.      Conus: The lower thoracic cord appears unremarkable. The conus  terminates at L2.      T12-L1:  There is minimal disc bulging as well as right greater than  left degenerative facet arthropathy. There is no significant  narrowing of the spinal canal or neuroforamina. L1-2: Mild facet and  ligamentum flavum hypertrophy and subtle disc bulging do not narrow  the spinal canal. The neuroforamina are patent. L2-3: Facet and  ligamentum flavum hypertrophy are again noted impressing on the  dorsal thecal sac. There is also prominent posterior epidural fat.  There is circumferential disc bulging and endplate spurring with  superimposed left paracentral/subarticular disc extrusion, new from  the previous examination. There is now effacement of the left lateral  recess and marked mass effect on the thecal sac with crowding of  nerve roots of the cauda equina. There is moderate overall narrowing  of the spinal canal . There is moderate left and mild right-sided  neuroforaminal narrowing due to an intraforaminal component of the  disc herniation which abuts and appears to contact the exiting left  L2 nerve root. L3-4: Facet and ligamentum flavum hypertrophy,  prominent  posterior epidural fat, and circumferential disc bulging  and endplate spurring are again noted. There is a broad-based disc  protrusion asymmetrically more pronounced to the left contributing to  narrowing of the left lateral recess and flattening of the ventral  thecal sac, new from the previous examination. However, the more  focal central disc protrusion seen on the previous examination is now  much less conspicuous. There is moderate to severe left and mild  right-sided neuroforaminal stenosis. Intraforaminal disc extrusion on  the left is again seen producing mass effect on the exiting left L3  nerve root. L4-5: Facet and ligamentum flavum hypertrophy and  circumferential disc bulging contribute to narrowing of the lateral  recesses, left greater than right with moderate, left greater than  right neuroforaminal narrowing. There is no significant narrowing of  the spinal canal. The findings are relatively similar from the  previous exam. L5-S1: Left greater than right facet and ligamentum  flavum hypertrophy and circumferential disc bulging and endplate  spurring are again demonstrated. There is a right subarticular disc  protrusion effacing the right lateral recess and contacting and  slightly displacing the right S1 nerve root, new from the prior  study. There is also a small left subarticular inferior disc  extrusion narrowing the left lateral recess and contacting the left  S1 nerve root. There is no significant narrowing of the spinal canal.  There is mild right and no significant left-sided neuroforaminal  stenosis.      There are degenerative changes of the sacroiliac joints.      There are partially imaged renal lesions favored to represent cysts.      IMPRESSION:      1. Left paracentral/subarticular and intraforaminal disc extrusion at  L2-3 effaces the left lateral recess and produces mass effect on the  thecal sac and crowding of nerve roots of the cauda equina. L5-S1  subarticular disc herniations  are new from the previous examination  and contribute to narrowing of the lateral recesses and at least  contact the S1 nerve roots with displacement of the 1 on the right.  2. Multilevel degenerative changes of the lumbar spine.  3. Remote appearing loss of height of L3.          MACRO:  None      Signed by: Ban Capellan 10/10/2023 9:34 AM  Dictation workstation:   [id number]     Assessment/Plan   Diagnoses and all orders for this visit:  Lumbar radiculopathy  Chronic bilateral low back pain, unspecified whether sciatica present  Chronic radicular low back pain  Spinal stenosis, lumbar region with neurogenic claudication       Patient is a 63-year-old male with a past medical history significant for lumbar neuritis, lumbar stenosis and lumbar spondylosis.  Previous RFA helped some of her back pain but reports, she still had back pain.  We had obtained imaging and she saw a surgeon.  The surgeon did not recommend surgery and rather recommended her here to discuss other options.  She now admits to lower back pain but also bilateral leg fatigue with walking.  This affects her ambulatory status.  This affects her quality of life.  This affects her activities and affects her ability to do the things she wants to do.  Based on her imaging findings, her pain pattern, and her failure to improve with previous conservative treatments I recommended to patient an L5-S1 epidural steroid injection under fluoroscopy for both diagnostic and therapeutic purposes.  Diagnosis-M54.16-lumbar neuritis.  Medication hold including aspirin for 6 days and vitamins for 7 days was discussed.  Procedure was discussed purpose and benefits were discussed.  Patient is agreeable.  She will follow-up 2 weeks after the injection for reevaluation.  Call clinic sooner if necessary.

## 2024-02-15 NOTE — H&P (VIEW-ONLY)
Subjective   Patient ID: Nusrat Irizarry is a 63 y.o. female who presents for Back Pain (FOLLOW UP CONSULT FROM IRENA OLIVERA, HE RECOMMENDS DISCUSSING EPIDURAL OVER RFA, ONGOING BILATERAL LOWER BACK PAIN THAT FEELS LIKE IT IS BEING COMPRESSED, ).SHE REPOSITIONS AND LAYS FLAT STRETCHED OUT TO ALLEVIATE THE PAIN, SHE WILL TAKE TYLENOL, ICE FOR RELIEF, PAIN SCORE 4/10, ELIZABETH=58%, FALLS NO, ETOH NO    Bekah eDgroot, MARGARITA 02/15/24 9:52 AM     Patient is a 63-year-old female with a past medical history significant for sacroiliitis, lumbar spondylosis, lumbar stenosis, lumbar degenerative disc disease, lumbar disc bulge, lumbar neuritis and chronic back pain.     Patient recently saw surgeon.  He did not recommend surgery but rather a possible epidural.  Patient underwent most recently bilateral L2-3 and L3-4 medial branch blocks that she felt that she did not get any significant relief from.  She states that maybe 50% but she did not feel that it was overall beneficial.  Prior to that she underwent previous bilateral L4-5 and L5-S1 facet RFA. This was done on 6/23/2023 and got 75% relief but does feel that this pain has started to return    Unfortunately, she is still having lower back pain, buttock pain.  she now also admits to leg tiredness and heaviness.  She states that the more she walks the more her legs get fatigued.  She rates this a 4/10 but states that the more she does it can get worse.  Historically, conservative treatments including therapy have failed.          Review of Systems   Constitutional: Negative.    HENT: Negative.     Eyes: Negative.    Respiratory: Negative.     Cardiovascular: Negative.    Gastrointestinal: Negative.    Endocrine: Negative.    Genitourinary: Negative.    Musculoskeletal:  Positive for arthralgias, back pain and gait problem.   Skin: Negative.    Allergic/Immunologic: Negative.    Hematological: Negative.    Psychiatric/Behavioral: Negative.         Objective   Physical  Exam  Vitals and nursing note reviewed.   Constitutional:       Appearance: Normal appearance. She is obese.   HENT:      Head: Normocephalic and atraumatic.      Right Ear: External ear normal.      Left Ear: External ear normal.      Nose: Nose normal.      Mouth/Throat:      Pharynx: Oropharynx is clear.   Eyes:      Pupils: Pupils are equal, round, and reactive to light.   Cardiovascular:      Rate and Rhythm: Normal rate and regular rhythm.      Pulses: Normal pulses.      Heart sounds: Normal heart sounds.   Pulmonary:      Effort: Pulmonary effort is normal.      Breath sounds: Normal breath sounds.   Musculoskeletal:         General: Normal range of motion.      Cervical back: Normal range of motion.      Comments: 5/5 lower extremity strength other than bilateral hip flexion, ADF and EHL 5 -/5   Skin:     General: Skin is warm and dry.   Neurological:      General: No focal deficit present.      Mental Status: She is alert and oriented to person, place, and time. Mental status is at baseline.   Psychiatric:         Mood and Affect: Mood normal.         Behavior: Behavior normal.         Thought Content: Thought content normal.         Judgment: Judgment normal.         Imaging Results  - documented in this encounter  XR Lumbar Spine Standard with Flex/Ext 4+ Views (01/31/2024 11:17 AM EST)  Imaging Results - XR Lumbar Spine Standard with Flex/Ext 4+ Views (01/31/2024 11:17 AM EST)  Anatomical Region Laterality Modality   T-spine, L-spine, Pelvis   Digital Radiography     Imaging Results - XR Lumbar Spine Standard with Flex/Ext 4+ Views (01/31/2024 11:17 AM EST)  Specimen (Source) Anatomical Location / Laterality Collection Method / Volume Collection Time Received Time         02/01/2024 7:37 AM EST       Imaging Results - XR Lumbar Spine Standard with Flex/Ext 4+ Views (01/31/2024 11:17 AM EST)  Impressions   02/01/2024 8:11 AM EST     1. Stable L3 superior endplate compression fracture.  2. Multilevel  spinal degenerative changes with a slight degenerative retrolisthesis L3 on L4.  3. No evidence of dynamic instability.  JAR/hb          Workstation ID:   [id number]      Imaging Results - XR Lumbar Spine Standard with Flex/Ext 4+ Views (01/31/2024 11:17 AM EST)  Narrative   02/01/2024 8:11 AM EST   EXAMINATION:  XR LUMBAR SPINE STANDARD WITH FLEX/EXT 4+ VIEWS  1/31/2024 11:15 am    HISTORY:  ORDERING SYSTEM PROVIDED HISTORY:  Back pain,    TECHNOLOGIST PROVIDED HISTORY:  Illness/Other  Reason for exam: low back pain  Cancer History: u  Surgery, RadiationHistory: u  Encounter Type: Initial  Additional signs and symptoms: .    ORDERING SYSTEM PROVIDED DIAGNOSIS CODES:  M54.50 Low back pain, unspecified back pain laterality, unspecified chronicity, unspecified whether sciatica present      COMPARISON:  No prior lumbar spine series.  Correlation made with MR lumbar spine October 9, 2023.    FINDINGS:  Five views of the lumbar spine were obtained including lateral flexion and extension.    There is a superior endplate compression fracture of the L3 vertebrae which appears to be grossly stable from the prior MRI.  The remaining lumbar vertebrae are maintained in height.  Multilevel spinal degenerative changes are present.  There is a slight retrolisthesis L3 on L4.  No evidence of dynamic instability with lateral flexion and extension.     MR lumbar spine wo IV contrast  Status: Final result     PACS Images     Show images for MR lumbar spine wo IV contrast  Signed by    Signed Time Phone Pager   Ban Capellan DO 10/10/2023 09:34 [phone]     Exam Information    Status Exam Begun Exam Ended   Final 10/09/2023 17:19 10/09/2023 17:40     Study Result    Narrative & Impression   Interpreted By:  Ban Capellan,   STUDY:  MR LUMBAR SPINE WO IV CONTRAST;  10/9/2023 5:40 pm      INDICATION:  Signs/Symptoms: lower back and butt/leg pain  M47.816: Lumbar  arthropathy M54.16: Chronic radicular low back pain G89.29:  .      COMPARISON:  December 28, 2021      ACCESSION NUMBER(S):  [id number]      ORDERING CLINICIAN:  LM ALFARO      TECHNIQUE:  Sagittal T1, T2, STIR, axial T1 and T2 weighted images of the lumbar  spine were acquired.      FINDINGS:  Alignment: There is again subtle, grade 1 retrolisthesis of L5 on S1  and L3 on L4.      Vertebrae/Intervertebral Discs: There is again loss of height and  anterior wedging of L3. A Schmorl's node is again seen along the  superior endplate of T12. Otherwise, the vertebral body heights are  preserved. There are again mild degenerative endplate signal changes.  There is persistent disc desiccation and multilevel degenerative  endplate spurring. Loss of disc height remains most pronounced  posteriorly at L2-3 and L3-4.      Conus: The lower thoracic cord appears unremarkable. The conus  terminates at L2.      T12-L1:  There is minimal disc bulging as well as right greater than  left degenerative facet arthropathy. There is no significant  narrowing of the spinal canal or neuroforamina. L1-2: Mild facet and  ligamentum flavum hypertrophy and subtle disc bulging do not narrow  the spinal canal. The neuroforamina are patent. L2-3: Facet and  ligamentum flavum hypertrophy are again noted impressing on the  dorsal thecal sac. There is also prominent posterior epidural fat.  There is circumferential disc bulging and endplate spurring with  superimposed left paracentral/subarticular disc extrusion, new from  the previous examination. There is now effacement of the left lateral  recess and marked mass effect on the thecal sac with crowding of  nerve roots of the cauda equina. There is moderate overall narrowing  of the spinal canal . There is moderate left and mild right-sided  neuroforaminal narrowing due to an intraforaminal component of the  disc herniation which abuts and appears to contact the exiting left  L2 nerve root. L3-4: Facet and ligamentum flavum hypertrophy,  prominent  posterior epidural fat, and circumferential disc bulging  and endplate spurring are again noted. There is a broad-based disc  protrusion asymmetrically more pronounced to the left contributing to  narrowing of the left lateral recess and flattening of the ventral  thecal sac, new from the previous examination. However, the more  focal central disc protrusion seen on the previous examination is now  much less conspicuous. There is moderate to severe left and mild  right-sided neuroforaminal stenosis. Intraforaminal disc extrusion on  the left is again seen producing mass effect on the exiting left L3  nerve root. L4-5: Facet and ligamentum flavum hypertrophy and  circumferential disc bulging contribute to narrowing of the lateral  recesses, left greater than right with moderate, left greater than  right neuroforaminal narrowing. There is no significant narrowing of  the spinal canal. The findings are relatively similar from the  previous exam. L5-S1: Left greater than right facet and ligamentum  flavum hypertrophy and circumferential disc bulging and endplate  spurring are again demonstrated. There is a right subarticular disc  protrusion effacing the right lateral recess and contacting and  slightly displacing the right S1 nerve root, new from the prior  study. There is also a small left subarticular inferior disc  extrusion narrowing the left lateral recess and contacting the left  S1 nerve root. There is no significant narrowing of the spinal canal.  There is mild right and no significant left-sided neuroforaminal  stenosis.      There are degenerative changes of the sacroiliac joints.      There are partially imaged renal lesions favored to represent cysts.      IMPRESSION:      1. Left paracentral/subarticular and intraforaminal disc extrusion at  L2-3 effaces the left lateral recess and produces mass effect on the  thecal sac and crowding of nerve roots of the cauda equina. L5-S1  subarticular disc herniations  are new from the previous examination  and contribute to narrowing of the lateral recesses and at least  contact the S1 nerve roots with displacement of the 1 on the right.  2. Multilevel degenerative changes of the lumbar spine.  3. Remote appearing loss of height of L3.          MACRO:  None      Signed by: Ban Capellan 10/10/2023 9:34 AM  Dictation workstation:   [id number]     Assessment/Plan   Diagnoses and all orders for this visit:  Lumbar radiculopathy  Chronic bilateral low back pain, unspecified whether sciatica present  Chronic radicular low back pain  Spinal stenosis, lumbar region with neurogenic claudication       Patient is a 63-year-old male with a past medical history significant for lumbar neuritis, lumbar stenosis and lumbar spondylosis.  Previous RFA helped some of her back pain but reports, she still had back pain.  We had obtained imaging and she saw a surgeon.  The surgeon did not recommend surgery and rather recommended her here to discuss other options.  She now admits to lower back pain but also bilateral leg fatigue with walking.  This affects her ambulatory status.  This affects her quality of life.  This affects her activities and affects her ability to do the things she wants to do.  Based on her imaging findings, her pain pattern, and her failure to improve with previous conservative treatments I recommended to patient an L5-S1 epidural steroid injection under fluoroscopy for both diagnostic and therapeutic purposes.  Diagnosis-M54.16-lumbar neuritis.  Medication hold including aspirin for 6 days and vitamins for 7 days was discussed.  Procedure was discussed purpose and benefits were discussed.  Patient is agreeable.  She will follow-up 2 weeks after the injection for reevaluation.  Call clinic sooner if necessary.

## 2024-02-16 ENCOUNTER — TELEPHONE (OUTPATIENT)
Dept: PAIN MEDICINE | Facility: CLINIC | Age: 64
End: 2024-02-16
Payer: MEDICARE

## 2024-02-22 ENCOUNTER — TELEPHONE (OUTPATIENT)
Dept: PAIN MEDICINE | Facility: CLINIC | Age: 64
End: 2024-02-22
Payer: MEDICARE

## 2024-02-22 NOTE — TELEPHONE ENCOUNTER
----- Message from Latha Briones RN sent at 2/15/2024  1:09 PM EST -----    ----- Message -----  From: YOHANA Madrid-CNP, DNP  Sent: 2/15/2024  10:16 AM EST  To: Latha Briones RN    Yes ma'am!  ----- Message -----  From: Latha Briones RN  Sent: 2/15/2024  10:13 AM EST  To: Oneil Tenorio MD; #    Patient is scheduled for a LESI in March with Dr. Patel.  Is it acceptable to you that she holds her Aspirin for 6 days prior to and 24 hours after her procedure?    Thank you,  Latha

## 2024-02-22 NOTE — TELEPHONE ENCOUNTER
Pt added to 3/1/24 injection schedule and educated on medication hold see separate message for injection.

## 2024-02-27 ENCOUNTER — PREP FOR PROCEDURE (OUTPATIENT)
Dept: PAIN MEDICINE | Facility: CLINIC | Age: 64
End: 2024-02-27
Payer: MEDICARE

## 2024-02-27 DIAGNOSIS — M54.16 LUMBAR RADICULOPATHY: Primary | ICD-10-CM

## 2024-02-27 RX ORDER — BUPIVACAINE HYDROCHLORIDE 2.5 MG/ML
2 INJECTION, SOLUTION EPIDURAL; INFILTRATION; INTRACAUDAL ONCE
Status: CANCELLED | OUTPATIENT
Start: 2024-03-01 | End: 2024-02-27

## 2024-02-27 RX ORDER — LIDOCAINE HYDROCHLORIDE 10 MG/ML
10 INJECTION, SOLUTION EPIDURAL; INFILTRATION; INTRACAUDAL; PERINEURAL ONCE
Status: CANCELLED | OUTPATIENT
Start: 2024-03-01 | End: 2024-02-27

## 2024-02-27 RX ORDER — SODIUM CHLORIDE 9 MG/ML
6 INJECTION, SOLUTION INTRAMUSCULAR; INTRAVENOUS; SUBCUTANEOUS ONCE
Status: CANCELLED | OUTPATIENT
Start: 2024-03-01 | End: 2024-02-27

## 2024-02-27 RX ORDER — DEXAMETHASONE SODIUM PHOSPHATE 10 MG/ML
10 INJECTION INTRAMUSCULAR; INTRAVENOUS ONCE
Status: CANCELLED | OUTPATIENT
Start: 2024-03-01 | End: 2024-02-27

## 2024-02-28 NOTE — OP NOTE
Date: 3/1/2024    OR Location: [facility] OR    Name: Nusrat Irizarry  : 1960 Age: 63 y.o.  MRN: [id number]  Sex: female     Diagnosis  Lumbar radiculopathy    Procedures  L5-S1 interlaminar epidural steroid injection with fluoroscopy    Surgeons   Nicolas Patel MD       Procedure Summary  Anesthesia: Local  ASA: ASA status not filed in the log.  Anesthesia Staff: Dr. Patel  Estimated Blood Loss: 0 mL  Intra-op Medications: * Intraprocedure medication information is unavailable because the case start and end events have not been set *      Intraprocedure I/O Totals       None           Specimen: No specimens collected       Indications: Nusrat Irizarry is an 63 y.o. female who is having a low back pain with pain radiating down both legs.  Lumbar MRI on 10/10/2023 reveals the presence of subarticular disc herniations at the L5-S1 level on both the right and left with at least contacting the S1 nerve roots with displacement on the right.  The patient was seen in the preoperative area. The risks, benefits, complications, treatment options, non-operative alternatives, expected recovery and outcomes were discussed with the patient. The possibilities of reaction to medication, injury to surrounding structures, bleeding and infection were discussed with the patient. The patient concurred with the proposed plan, giving informed consent.  The site of surgery was properly noted/marked.     Procedure Details: Patient was taken to the OR and placed on the procedure table with a pillow under the hips to assist with lumbar flexion.  Patient voiced that they were in a comfortable position.  Using aseptic technique the lower lumbar region was prepped with chlorhexidine solution and draped in a standard fashion.  Using fluoroscopy with assistance of the radiological technician: The L5-S1 level was identified.  The skin and underlying tissue was anesthetized with 5 mL of 1% lidocaine.    Using both AP and lateral views with  fluoroscopy a 6.0 inch number 18-gauge Touhy needle was placed into the L5-S1 epidural space using the loss-of-resistance technique without complication.  There is no blood or CSF emanating from the needle nor were there any paresthesias.  3 cc of Omnipaque 300 dye was injected with excellent epidural spread and no vascular uptake.  A total of 2 mL of 0.25 bupivacaine and 6 mL of sterile saline along with 10 mg dexamethasone PF was injected without complication.  The needle was then removed.    Patient tolerated the procedure well and was sent to the recovery room in stable condition.  Postop and follow-up instructions given to the patient.   Complications:  None; patient tolerated the procedure well.    Disposition: PACU - hemodynamically stable.  Condition: stable         Additional Details:       Nicolas Patel MD

## 2024-02-29 DIAGNOSIS — E78.00 HYPERCHOLESTEREMIA: ICD-10-CM

## 2024-02-29 PROCEDURE — RXMED WILLOW AMBULATORY MEDICATION CHARGE

## 2024-02-29 RX ORDER — ATORVASTATIN CALCIUM 40 MG/1
40 TABLET, FILM COATED ORAL DAILY
Qty: 90 TABLET | Refills: 3 | Status: SHIPPED | OUTPATIENT
Start: 2024-02-29 | End: 2025-02-28

## 2024-03-01 ENCOUNTER — PHARMACY VISIT (OUTPATIENT)
Dept: PHARMACY | Facility: CLINIC | Age: 64
End: 2024-03-01
Payer: MEDICARE

## 2024-03-01 ENCOUNTER — HOSPITAL ENCOUNTER (OUTPATIENT)
Dept: OPERATING ROOM | Facility: HOSPITAL | Age: 64
Setting detail: OUTPATIENT SURGERY
Discharge: HOME | End: 2024-03-01
Payer: MEDICARE

## 2024-03-01 ENCOUNTER — HOSPITAL ENCOUNTER (OUTPATIENT)
Dept: RADIOLOGY | Facility: HOSPITAL | Age: 64
Discharge: HOME | End: 2024-03-01
Payer: MEDICARE

## 2024-03-01 VITALS
DIASTOLIC BLOOD PRESSURE: 81 MMHG | HEIGHT: 69 IN | HEART RATE: 70 BPM | TEMPERATURE: 96.3 F | SYSTOLIC BLOOD PRESSURE: 122 MMHG | OXYGEN SATURATION: 97 % | WEIGHT: 230 LBS | RESPIRATION RATE: 16 BRPM | BODY MASS INDEX: 34.07 KG/M2

## 2024-03-01 DIAGNOSIS — M54.16 LUMBAR RADICULOPATHY: ICD-10-CM

## 2024-03-01 PROCEDURE — 2500000004 HC RX 250 GENERAL PHARMACY W/ HCPCS (ALT 636 FOR OP/ED): Performed by: ANESTHESIOLOGY

## 2024-03-01 PROCEDURE — 2500000004 HC RX 250 GENERAL PHARMACY W/ HCPCS (ALT 636 FOR OP/ED): Performed by: PHYSICIAN ASSISTANT

## 2024-03-01 PROCEDURE — A4216 STERILE WATER/SALINE, 10 ML: HCPCS | Performed by: ANESTHESIOLOGY

## 2024-03-01 PROCEDURE — 62323 NJX INTERLAMINAR LMBR/SAC: CPT | Performed by: ANESTHESIOLOGY

## 2024-03-01 PROCEDURE — 2550000001 HC RX 255 CONTRASTS: Performed by: PHYSICIAN ASSISTANT

## 2024-03-01 PROCEDURE — RXMED WILLOW AMBULATORY MEDICATION CHARGE

## 2024-03-01 PROCEDURE — 2500000005 HC RX 250 GENERAL PHARMACY W/O HCPCS: Performed by: PHYSICIAN ASSISTANT

## 2024-03-01 RX ORDER — LIDOCAINE HYDROCHLORIDE 10 MG/ML
10 INJECTION, SOLUTION EPIDURAL; INFILTRATION; INTRACAUDAL; PERINEURAL ONCE
Status: COMPLETED | OUTPATIENT
Start: 2024-03-01 | End: 2024-03-01

## 2024-03-01 RX ORDER — SODIUM CHLORIDE 9 MG/ML
6 INJECTION, SOLUTION INTRAMUSCULAR; INTRAVENOUS; SUBCUTANEOUS ONCE
Status: DISCONTINUED | OUTPATIENT
Start: 2024-03-01 | End: 2024-03-02 | Stop reason: HOSPADM

## 2024-03-01 RX ORDER — DEXAMETHASONE SODIUM PHOSPHATE 10 MG/ML
10 INJECTION INTRAMUSCULAR; INTRAVENOUS ONCE
Status: COMPLETED | OUTPATIENT
Start: 2024-03-01 | End: 2024-03-01

## 2024-03-01 RX ORDER — SODIUM CHLORIDE 9 MG/ML
INJECTION, SOLUTION INTRAMUSCULAR; INTRAVENOUS; SUBCUTANEOUS AS NEEDED
Status: COMPLETED | OUTPATIENT
Start: 2024-03-01 | End: 2024-03-01

## 2024-03-01 RX ORDER — BUPIVACAINE HYDROCHLORIDE 2.5 MG/ML
2 INJECTION, SOLUTION EPIDURAL; INFILTRATION; INTRACAUDAL ONCE
Status: COMPLETED | OUTPATIENT
Start: 2024-03-01 | End: 2024-03-01

## 2024-03-01 RX ADMIN — DEXAMETHASONE SODIUM PHOSPHATE 10 MG: 10 INJECTION INTRAMUSCULAR; INTRAVENOUS at 13:11

## 2024-03-01 RX ADMIN — SODIUM CHLORIDE 5 ML: 9 INJECTION, SOLUTION INTRAMUSCULAR; INTRAVENOUS; SUBCUTANEOUS at 13:11

## 2024-03-01 RX ADMIN — LIDOCAINE HYDROCHLORIDE 6 ML: 10 INJECTION, SOLUTION EPIDURAL; INFILTRATION; INTRACAUDAL; PERINEURAL at 13:10

## 2024-03-01 RX ADMIN — BUPIVACAINE HYDROCHLORIDE 2 ML: 2.5 INJECTION, SOLUTION EPIDURAL; INFILTRATION; INTRACAUDAL; PERINEURAL at 13:11

## 2024-03-01 RX ADMIN — IOHEXOL 1 ML: 300 INJECTION, SOLUTION INTRAVENOUS at 13:10

## 2024-03-01 ASSESSMENT — PAIN SCALES - GENERAL
PAINLEVEL_OUTOF10: 4
PAINLEVEL_OUTOF10: 0 - NO PAIN

## 2024-03-01 ASSESSMENT — PAIN - FUNCTIONAL ASSESSMENT
PAIN_FUNCTIONAL_ASSESSMENT: 0-10
PAIN_FUNCTIONAL_ASSESSMENT: 0-10

## 2024-03-01 NOTE — Clinical Note
Pt arrived to OR on cart. Transferred to OR table. Safety strap applied. Pt padded with pillows in prone  position. Pt prepped with Chloraprep with a fire risk of 1. Bandaids applied to injection sites. Pt. Transferred to ACS on cart.

## 2024-03-14 PROCEDURE — RXMED WILLOW AMBULATORY MEDICATION CHARGE

## 2024-03-15 ENCOUNTER — PHARMACY VISIT (OUTPATIENT)
Dept: PHARMACY | Facility: CLINIC | Age: 64
End: 2024-03-15
Payer: MEDICARE

## 2024-03-25 PROCEDURE — RXMED WILLOW AMBULATORY MEDICATION CHARGE

## 2024-03-26 ENCOUNTER — PHARMACY VISIT (OUTPATIENT)
Dept: PHARMACY | Facility: CLINIC | Age: 64
End: 2024-03-26
Payer: MEDICARE

## 2024-03-26 ENCOUNTER — OFFICE VISIT (OUTPATIENT)
Dept: PAIN MEDICINE | Facility: CLINIC | Age: 64
End: 2024-03-26
Payer: MEDICARE

## 2024-03-26 VITALS
SYSTOLIC BLOOD PRESSURE: 119 MMHG | WEIGHT: 239 LBS | HEART RATE: 84 BPM | BODY MASS INDEX: 35.29 KG/M2 | DIASTOLIC BLOOD PRESSURE: 73 MMHG | RESPIRATION RATE: 16 BRPM

## 2024-03-26 DIAGNOSIS — G89.29 CHRONIC RADICULAR LOW BACK PAIN: ICD-10-CM

## 2024-03-26 DIAGNOSIS — M51.36 DISC DEGENERATION, LUMBAR: ICD-10-CM

## 2024-03-26 DIAGNOSIS — M48.062 SPINAL STENOSIS, LUMBAR REGION WITH NEUROGENIC CLAUDICATION: ICD-10-CM

## 2024-03-26 DIAGNOSIS — G62.9 NEUROPATHY: ICD-10-CM

## 2024-03-26 DIAGNOSIS — M54.50 CHRONIC BILATERAL LOW BACK PAIN, UNSPECIFIED WHETHER SCIATICA PRESENT: ICD-10-CM

## 2024-03-26 DIAGNOSIS — G89.29 CHRONIC SI JOINT PAIN: Primary | ICD-10-CM

## 2024-03-26 DIAGNOSIS — G89.29 CHRONIC BILATERAL LOW BACK PAIN, UNSPECIFIED WHETHER SCIATICA PRESENT: ICD-10-CM

## 2024-03-26 DIAGNOSIS — M54.16 CHRONIC RADICULAR LOW BACK PAIN: ICD-10-CM

## 2024-03-26 DIAGNOSIS — M54.16 LUMBAR RADICULOPATHY: ICD-10-CM

## 2024-03-26 DIAGNOSIS — M53.3 CHRONIC SI JOINT PAIN: Primary | ICD-10-CM

## 2024-03-26 PROCEDURE — 99214 OFFICE O/P EST MOD 30 MIN: CPT | Performed by: PHYSICIAN ASSISTANT

## 2024-03-26 ASSESSMENT — ENCOUNTER SYMPTOMS
BACK PAIN: 1
CARDIOVASCULAR NEGATIVE: 1
RESPIRATORY NEGATIVE: 1
ARTHRALGIAS: 1
ALLERGIC/IMMUNOLOGIC NEGATIVE: 1
ENDOCRINE NEGATIVE: 1
EYES NEGATIVE: 1
CONSTITUTIONAL NEGATIVE: 1
MYALGIAS: 1
PSYCHIATRIC NEGATIVE: 1
HEMATOLOGIC/LYMPHATIC NEGATIVE: 1
GASTROINTESTINAL NEGATIVE: 1

## 2024-03-26 ASSESSMENT — COLUMBIA-SUICIDE SEVERITY RATING SCALE - C-SSRS
2. HAVE YOU ACTUALLY HAD ANY THOUGHTS OF KILLING YOURSELF?: NO
1. IN THE PAST MONTH, HAVE YOU WISHED YOU WERE DEAD OR WISHED YOU COULD GO TO SLEEP AND NOT WAKE UP?: NO
6. HAVE YOU EVER DONE ANYTHING, STARTED TO DO ANYTHING, OR PREPARED TO DO ANYTHING TO END YOUR LIFE?: NO

## 2024-03-26 NOTE — PROGRESS NOTES
Subjective   Patient ID: Nusrat Irizarry is a 63 y.o. female who presents for Pain (FUV for SHWETHA done 3-1-24. Pain is in R low back/hip area. Pain score 4/10 today. Pain is described as throbbing. Standing/walking and rolling over in bed will increase the pain. Medications help decrease the apin. ELIZABETH = 62/100. ).     Tonya Osman RN 03/26/24 10:00 AM     Patient is a 63-year-old female with a past medical history significant for sacroiliitis, lumbar spondylosis, lumbar stenosis, lumbar degenerative disc disease, lumbar disc bulge, lumbar neuritis and chronic back pain.   She presents today for follow-up after undergoing L5-S1 epidural steroid injection.  This was done on 3/1/2024 and has given her 100% relief of her left-sided pain.  Unfortunate, she is having some right-sided buttock pain.  This is what is affecting her ambulatory status and her ability to do things now.  It is in her buttock only.  No radiculopathy.  Historically she has undergone bilateral L4-5 and L5-S1 facet RFA. This was done on 6/23/2023 and got 75% relief but does feel that this pain has started to return.  She states that this right-sided buttock pain is different.  She has tried stretching at home that has not helped.  She has trialed over-the-counter medications that have not helped.  Recent epidural resolved all of her symptoms other than this pain.          Review of Systems   Constitutional: Negative.    HENT: Negative.     Eyes: Negative.    Respiratory: Negative.     Cardiovascular: Negative.    Gastrointestinal: Negative.    Endocrine: Negative.    Genitourinary: Negative.    Musculoskeletal:  Positive for arthralgias, back pain, gait problem and myalgias.   Skin: Negative.    Allergic/Immunologic: Negative.    Hematological: Negative.    Psychiatric/Behavioral: Negative.         Objective   Physical Exam  Vitals and nursing note reviewed.   Constitutional:       Appearance: Normal appearance.   HENT:      Head: Normocephalic  and atraumatic.      Right Ear: External ear normal.      Left Ear: External ear normal.      Nose: Nose normal.      Mouth/Throat:      Pharynx: Oropharynx is clear.   Eyes:      Conjunctiva/sclera: Conjunctivae normal.   Cardiovascular:      Rate and Rhythm: Normal rate and regular rhythm.      Pulses: Normal pulses.   Pulmonary:      Effort: Pulmonary effort is normal.   Musculoskeletal:         General: Normal range of motion.      Cervical back: Normal range of motion.      Comments: 5/5 lower extremity strength  Pain with compression of the right-sided sacroiliac joint  Positive PEPE test on the right  Positive thigh thrust on the right  Positive Gaenslen's test on the right   Skin:     General: Skin is warm and dry.      Comments: Injection site well-healed   Neurological:      General: No focal deficit present.      Mental Status: She is alert and oriented to person, place, and time. Mental status is at baseline.   Psychiatric:         Mood and Affect: Mood normal.         Behavior: Behavior normal.         Thought Content: Thought content normal.         Judgment: Judgment normal.         MR lumbar spine wo IV contrast  Status: Final result     PACS Images     Show images for MR lumbar spine wo IV contrast  Signed by    Signed Time Phone Pager   Ban Capellan DO 10/10/2023 09:34 [phone]     Exam Information    Status Exam Begun Exam Ended   Final 10/09/2023 17:19 10/09/2023 17:40     Study Result    Narrative & Impression   Interpreted By:  Ban Capellan,   STUDY:  MR LUMBAR SPINE WO IV CONTRAST;  10/9/2023 5:40 pm      INDICATION:  Signs/Symptoms: lower back and butt/leg pain  M47.816: Lumbar  arthropathy M54.16: Chronic radicular low back pain G89.29: .      COMPARISON:  December 28, 2021      ACCESSION NUMBER(S):  [id number]      ORDERING CLINICIAN:  LM ALFARO      TECHNIQUE:  Sagittal T1, T2, STIR, axial T1 and T2 weighted images of the lumbar  spine were acquired.       FINDINGS:  Alignment: There is again subtle, grade 1 retrolisthesis of L5 on S1  and L3 on L4.      Vertebrae/Intervertebral Discs: There is again loss of height and  anterior wedging of L3. A Schmorl's node is again seen along the  superior endplate of T12. Otherwise, the vertebral body heights are  preserved. There are again mild degenerative endplate signal changes.  There is persistent disc desiccation and multilevel degenerative  endplate spurring. Loss of disc height remains most pronounced  posteriorly at L2-3 and L3-4.      Conus: The lower thoracic cord appears unremarkable. The conus  terminates at L2.      T12-L1:  There is minimal disc bulging as well as right greater than  left degenerative facet arthropathy. There is no significant  narrowing of the spinal canal or neuroforamina. L1-2: Mild facet and  ligamentum flavum hypertrophy and subtle disc bulging do not narrow  the spinal canal. The neuroforamina are patent. L2-3: Facet and  ligamentum flavum hypertrophy are again noted impressing on the  dorsal thecal sac. There is also prominent posterior epidural fat.  There is circumferential disc bulging and endplate spurring with  superimposed left paracentral/subarticular disc extrusion, new from  the previous examination. There is now effacement of the left lateral  recess and marked mass effect on the thecal sac with crowding of  nerve roots of the cauda equina. There is moderate overall narrowing  of the spinal canal . There is moderate left and mild right-sided  neuroforaminal narrowing due to an intraforaminal component of the  disc herniation which abuts and appears to contact the exiting left  L2 nerve root. L3-4: Facet and ligamentum flavum hypertrophy,  prominent posterior epidural fat, and circumferential disc bulging  and endplate spurring are again noted. There is a broad-based disc  protrusion asymmetrically more pronounced to the left contributing to  narrowing of the left lateral recess  and flattening of the ventral  thecal sac, new from the previous examination. However, the more  focal central disc protrusion seen on the previous examination is now  much less conspicuous. There is moderate to severe left and mild  right-sided neuroforaminal stenosis. Intraforaminal disc extrusion on  the left is again seen producing mass effect on the exiting left L3  nerve root. L4-5: Facet and ligamentum flavum hypertrophy and  circumferential disc bulging contribute to narrowing of the lateral  recesses, left greater than right with moderate, left greater than  right neuroforaminal narrowing. There is no significant narrowing of  the spinal canal. The findings are relatively similar from the  previous exam. L5-S1: Left greater than right facet and ligamentum  flavum hypertrophy and circumferential disc bulging and endplate  spurring are again demonstrated. There is a right subarticular disc  protrusion effacing the right lateral recess and contacting and  slightly displacing the right S1 nerve root, new from the prior  study. There is also a small left subarticular inferior disc  extrusion narrowing the left lateral recess and contacting the left  S1 nerve root. There is no significant narrowing of the spinal canal.  There is mild right and no significant left-sided neuroforaminal  stenosis.      There are degenerative changes of the sacroiliac joints.      There are partially imaged renal lesions favored to represent cysts.      IMPRESSION:      1. Left paracentral/subarticular and intraforaminal disc extrusion at  L2-3 effaces the left lateral recess and produces mass effect on the  thecal sac and crowding of nerve roots of the cauda equina. L5-S1  subarticular disc herniations are new from the previous examination  and contribute to narrowing of the lateral recesses and at least  contact the S1 nerve roots with displacement of the 1 on the right.  2. Multilevel degenerative changes of the lumbar spine.  3.  Remote appearing loss of height of L3.          MACRO:  None      Signed by: Ban Capellan 10/10/2023 9:34 AM  Dictation workstation:   [id number]     Scans on Order [id number]        Diagnostics Testing - Other - Scan on 10/9/2023  5:21 PM: mri safety form              Result History    MR lumbar spine wo IV contrast (Order #[id number]) on 10/10/2023 - Order Result History Report    Assessment/Plan   Diagnoses and all orders for this visit:  Chronic SI joint pain  Chronic radicular low back pain  Chronic bilateral low back pain, unspecified whether sciatica present  Lumbar radiculopathy  Disc degeneration, lumbar  Spinal stenosis, lumbar region with neurogenic claudication  Neuropathy       Patient is a 63-year-old female with a past medical history seen for lumbar stenosis, lumbar neuritis, lumbar spondylosis, lumbar degenerative disease, lumbar disc bulge, chronic pain and sacroiliitis.  She is doing well in regards to her radicular symptoms.  Recent L5-S1 epidural steroid injection completely resolved her left-sided pain.  Unfortunately, she is having some right-sided buttock pain.  No radiculopathy.  This is affecting her ambulatory status.  This is affecting her quality of life.  This is affecting her activities and affecting her ability to do the things she wants to do.  She is overall very bothered by this and on physical examination her pain appears to be related to her sacroiliac joint.  I recommended to patient a right-sided sacroiliac joint injection under fluoroscopy for both diagnostic and therapeutic purposes.  Diagnosis-M46.1-sacroiliitis.  Procedure was discussed.  Risk and benefits were discussed.  Patient is agreeable.  She will follow-up 2 weeks after the injection for reevaluation.  Call clinic sooner if necessary.

## 2024-03-29 ENCOUNTER — TELEPHONE (OUTPATIENT)
Dept: PAIN MEDICINE | Facility: CLINIC | Age: 64
End: 2024-03-29
Payer: MEDICARE

## 2024-03-30 PROCEDURE — RXMED WILLOW AMBULATORY MEDICATION CHARGE

## 2024-04-02 ENCOUNTER — PHARMACY VISIT (OUTPATIENT)
Dept: PHARMACY | Facility: CLINIC | Age: 64
End: 2024-04-02
Payer: COMMERCIAL

## 2024-04-13 DIAGNOSIS — G62.9 NEUROPATHY: ICD-10-CM

## 2024-04-13 DIAGNOSIS — R20.0 NUMBNESS AND TINGLING IN BOTH HANDS: ICD-10-CM

## 2024-04-13 DIAGNOSIS — R20.2 NUMBNESS AND TINGLING IN BOTH HANDS: ICD-10-CM

## 2024-04-17 PROCEDURE — RXMED WILLOW AMBULATORY MEDICATION CHARGE

## 2024-04-17 RX ORDER — GABAPENTIN 300 MG/1
300 CAPSULE ORAL 4 TIMES DAILY
Qty: 120 CAPSULE | Refills: 2 | Status: SHIPPED | OUTPATIENT
Start: 2024-04-17

## 2024-04-18 PROCEDURE — RXMED WILLOW AMBULATORY MEDICATION CHARGE

## 2024-04-19 ENCOUNTER — PHARMACY VISIT (OUTPATIENT)
Dept: PHARMACY | Facility: CLINIC | Age: 64
End: 2024-04-19
Payer: MEDICARE

## 2024-04-23 ENCOUNTER — OFFICE VISIT (OUTPATIENT)
Dept: ORTHOPEDIC SURGERY | Facility: CLINIC | Age: 64
End: 2024-04-23
Payer: MEDICARE

## 2024-04-23 ENCOUNTER — PREP FOR PROCEDURE (OUTPATIENT)
Dept: PAIN MEDICINE | Facility: CLINIC | Age: 64
End: 2024-04-23

## 2024-04-23 DIAGNOSIS — M46.1 SACROILIITIS, NOT ELSEWHERE CLASSIFIED (CMS-HCC): Primary | ICD-10-CM

## 2024-04-23 DIAGNOSIS — M75.32 CALCIFIC TENDONITIS OF LEFT SHOULDER: ICD-10-CM

## 2024-04-23 DIAGNOSIS — M17.0 PRIMARY OSTEOARTHRITIS OF BOTH KNEES: Primary | ICD-10-CM

## 2024-04-23 PROCEDURE — 3008F BODY MASS INDEX DOCD: CPT | Performed by: NURSE PRACTITIONER

## 2024-04-23 PROCEDURE — 1160F RVW MEDS BY RX/DR IN RCRD: CPT | Performed by: NURSE PRACTITIONER

## 2024-04-23 PROCEDURE — 1125F AMNT PAIN NOTED PAIN PRSNT: CPT | Performed by: NURSE PRACTITIONER

## 2024-04-23 PROCEDURE — 1159F MED LIST DOCD IN RCRD: CPT | Performed by: NURSE PRACTITIONER

## 2024-04-23 PROCEDURE — 1036F TOBACCO NON-USER: CPT | Performed by: NURSE PRACTITIONER

## 2024-04-23 PROCEDURE — 99214 OFFICE O/P EST MOD 30 MIN: CPT | Performed by: NURSE PRACTITIONER

## 2024-04-23 RX ORDER — METHYLPREDNISOLONE ACETATE 40 MG/ML
40 INJECTION, SUSPENSION INTRA-ARTICULAR; INTRALESIONAL; INTRAMUSCULAR; SOFT TISSUE ONCE
Status: CANCELLED | OUTPATIENT
Start: 2024-04-26 | End: 2024-04-23

## 2024-04-23 RX ORDER — BUPIVACAINE HYDROCHLORIDE 5 MG/ML
2 INJECTION, SOLUTION EPIDURAL; INTRACAUDAL ONCE
Status: CANCELLED | OUTPATIENT
Start: 2024-04-26 | End: 2024-04-23

## 2024-04-23 RX ORDER — LIDOCAINE HYDROCHLORIDE 20 MG/ML
6 INJECTION, SOLUTION EPIDURAL; INFILTRATION; INTRACAUDAL; PERINEURAL ONCE
Status: CANCELLED | OUTPATIENT
Start: 2024-04-26 | End: 2024-04-23

## 2024-04-23 ASSESSMENT — ENCOUNTER SYMPTOMS
ARTHRALGIAS: 1
PSYCHIATRIC NEGATIVE: 1
KNEE SWELLING: 1
CONSTITUTIONAL NEGATIVE: 1
CARDIOVASCULAR NEGATIVE: 1
ENDOCRINE NEGATIVE: 1
HEMATOLOGIC/LYMPHATIC NEGATIVE: 1
KNEE DEFORMITY: 1
RESPIRATORY NEGATIVE: 1

## 2024-04-23 ASSESSMENT — PAIN DESCRIPTION - DESCRIPTORS: DESCRIPTORS: SHARP

## 2024-04-23 ASSESSMENT — PAIN - FUNCTIONAL ASSESSMENT: PAIN_FUNCTIONAL_ASSESSMENT: 0-10

## 2024-04-23 ASSESSMENT — PAIN SCALES - GENERAL: PAINLEVEL_OUTOF10: 3

## 2024-04-23 NOTE — PROGRESS NOTES
Subjective    Patient ID: Nusrat Irizarry is a 63 y.o. female.    Chief Complaint: Pain and Follow-up of the Left Knee and Pain and Follow-up of the Right Knee    HPI:    Nusrat is a pleasant 62 yo presenting today for FUV  bilat knee pain.  R>L, no inj or falls.   Cortisone injection on 3/12/2024 with good symptom improvement.  Sx aggravated with step us and, first thing upon rising and trying to ambulate.   Knees continue with achy pain  Tylenol for sx control with a little relief, ice helped better.   Patient also reports increasing symptoms of left shoulder pain, history of cortisone in the past with last approximately 2 or more years ago.  No new injuries    Review of Systems   Constitutional: Negative.    HENT: Negative.     Respiratory: Negative.     Cardiovascular: Negative.    Endocrine: Negative.    Musculoskeletal:  Positive for arthralgias and gait problem.   Skin: Negative.    Hematological: Negative.    Psychiatric/Behavioral: Negative.         Objective   Right Knee Exam     Tenderness   The patient is experiencing tenderness in the lateral joint line and medial joint line (Patellofemoral).    Range of Motion   Extension:  normal   Flexion:  100     Tests   Cristino:  Medial - negative Lateral - negative  Varus: negative Valgus: negative  Lachman:  Anterior - negative      Drawer:  Anterior - negative    Posterior - negative    Other   Erythema: absent  Sensation: normal  Pulse: present  Swelling: mild  Effusion: no effusion present    Comments:  Positive bilateral antalgic gait with bilateral limping, R>L.  There is mild crepitus on range of motion exam palpated  Full ROM of distal joints with no sx aggravation.  Feet and ankles with pink, dry, intact skin.  There is no warmth or heat over either.  Distal motor and sensory intact, cap refill at 2 seconds.      Left Knee Exam     Tenderness   The patient is experiencing tenderness in the lateral joint line (Patellofemoral).    Range of Motion    Extension:  normal   Flexion:  110     Tests   Cristino:  Medial - negative Lateral - negative  Varus: negative Valgus: negative  Lachman:  Anterior - negative      Drawer:  Anterior - negative     Posterior - negative    Other   Erythema: absent  Sensation: normal  Pulse: present  Swelling: none    Comments:  Positive bilateral antalgic gait with bilateral limping.   Full ROM of distal joints with no sx aggravation.  Feet and ankles with pink, dry, intact skin.  There is no warmth or heat over either.  Distal motor and sensory intact, cap refill at 2 seconds.            Image Results:       === 01/23/24 ===    XR KNEE RIGHT 4+ VIEWS    - Impression -  Mild tricompartmental osteoarthritis right knee with small joint  effusion.    Signed by: Reginald Monsivais 1/24/2024 5:47 PM  Dictation workstation:   [id number]     Assessment/Plan     Problem List Items Addressed This Visit             ICD-10-CM    Calcific tendonitis of left shoulder M75.32    Primary osteoarthritis of both knees - Primary M17.0     We reviewed conservative measures for knee OA symptom control.  Patient to continue to take Tylenol per package directions, diclofenac gel up to 4 times daily.  Continue home exercises as tolerated.  We briefly discussed left shoulder with increased pain, brief review of x-ray shows history of degenerative changes and cortisone injection.  If patient wishes to pursue possible cortisone injection, she has been advised to make a dedicated appointment for the left shoulder and will need to update imaging.  She is in agreement with this plan of care.  She will follow-up on as needed basis for either the knees or shoulder.  This note was generated using Dragon software.  It may contain errors in wording, punctuation or spelling.

## 2024-04-23 NOTE — ASSESSMENT & PLAN NOTE
We reviewed conservative measures for knee OA symptom control.  Patient to continue to take Tylenol per package directions, diclofenac gel up to 4 times daily.  Continue home exercises as tolerated.  We briefly discussed left shoulder with increased pain, brief review of x-ray shows history of degenerative changes and cortisone injection.  If patient wishes to pursue possible cortisone injection, she has been advised to make a dedicated appointment for the left shoulder and will need to update imaging.  She is in agreement with this plan of care.  She will follow-up on as needed basis for either the knees or shoulder.  This note was generated using Dragon software.  It may contain errors in wording, punctuation or spelling.

## 2024-04-25 ENCOUNTER — PHARMACY VISIT (OUTPATIENT)
Dept: PHARMACY | Facility: CLINIC | Age: 64
End: 2024-04-25
Payer: MEDICARE

## 2024-04-25 PROCEDURE — RXMED WILLOW AMBULATORY MEDICATION CHARGE

## 2024-04-26 ENCOUNTER — HOSPITAL ENCOUNTER (OUTPATIENT)
Dept: RADIOLOGY | Facility: HOSPITAL | Age: 64
Discharge: HOME | End: 2024-04-26
Payer: MEDICARE

## 2024-04-26 ENCOUNTER — HOSPITAL ENCOUNTER (OUTPATIENT)
Dept: RADIOLOGY | Facility: CLINIC | Age: 64
Discharge: HOME | End: 2024-04-26
Payer: MEDICARE

## 2024-04-26 ENCOUNTER — HOSPITAL ENCOUNTER (OUTPATIENT)
Dept: OPERATING ROOM | Facility: HOSPITAL | Age: 64
Setting detail: OUTPATIENT SURGERY
Discharge: HOME | End: 2024-04-26
Payer: MEDICARE

## 2024-04-26 VITALS
WEIGHT: 230 LBS | DIASTOLIC BLOOD PRESSURE: 68 MMHG | SYSTOLIC BLOOD PRESSURE: 132 MMHG | HEIGHT: 69 IN | OXYGEN SATURATION: 97 % | RESPIRATION RATE: 18 BRPM | TEMPERATURE: 97.4 F | BODY MASS INDEX: 34.07 KG/M2 | HEART RATE: 69 BPM

## 2024-04-26 VITALS — BODY MASS INDEX: 34.07 KG/M2 | HEIGHT: 69 IN | WEIGHT: 230 LBS

## 2024-04-26 DIAGNOSIS — R20.2 NUMBNESS AND TINGLING IN BOTH HANDS: ICD-10-CM

## 2024-04-26 DIAGNOSIS — Z12.39 BREAST CANCER SCREENING, HIGH RISK PATIENT: ICD-10-CM

## 2024-04-26 DIAGNOSIS — G62.9 NEUROPATHY: ICD-10-CM

## 2024-04-26 DIAGNOSIS — M46.1 SACROILIITIS, NOT ELSEWHERE CLASSIFIED (CMS-HCC): ICD-10-CM

## 2024-04-26 DIAGNOSIS — R20.0 NUMBNESS AND TINGLING IN BOTH HANDS: ICD-10-CM

## 2024-04-26 PROCEDURE — 77067 SCR MAMMO BI INCL CAD: CPT | Performed by: RADIOLOGY

## 2024-04-26 PROCEDURE — 77080 DXA BONE DENSITY AXIAL: CPT | Mod: MUE

## 2024-04-26 PROCEDURE — 2500000005 HC RX 250 GENERAL PHARMACY W/O HCPCS: Performed by: STUDENT IN AN ORGANIZED HEALTH CARE EDUCATION/TRAINING PROGRAM

## 2024-04-26 PROCEDURE — 77067 SCR MAMMO BI INCL CAD: CPT | Mod: MUE

## 2024-04-26 PROCEDURE — 27096 INJECT SACROILIAC JOINT: CPT | Mod: RT | Performed by: STUDENT IN AN ORGANIZED HEALTH CARE EDUCATION/TRAINING PROGRAM

## 2024-04-26 PROCEDURE — 77063 BREAST TOMOSYNTHESIS BI: CPT

## 2024-04-26 PROCEDURE — 2500000004 HC RX 250 GENERAL PHARMACY W/ HCPCS (ALT 636 FOR OP/ED): Performed by: STUDENT IN AN ORGANIZED HEALTH CARE EDUCATION/TRAINING PROGRAM

## 2024-04-26 PROCEDURE — 77063 BREAST TOMOSYNTHESIS BI: CPT | Performed by: RADIOLOGY

## 2024-04-26 PROCEDURE — 2550000001 HC RX 255 CONTRASTS: Performed by: PHYSICIAN ASSISTANT

## 2024-04-26 PROCEDURE — 77080 DXA BONE DENSITY AXIAL: CPT

## 2024-04-26 RX ORDER — METHYLPREDNISOLONE ACETATE 40 MG/ML
40 INJECTION, SUSPENSION INTRA-ARTICULAR; INTRALESIONAL; INTRAMUSCULAR; SOFT TISSUE ONCE
Status: DISCONTINUED | OUTPATIENT
Start: 2024-04-26 | End: 2024-04-27 | Stop reason: HOSPADM

## 2024-04-26 RX ORDER — BUPIVACAINE HYDROCHLORIDE 5 MG/ML
2 INJECTION, SOLUTION EPIDURAL; INTRACAUDAL ONCE
Status: DISCONTINUED | OUTPATIENT
Start: 2024-04-26 | End: 2024-04-27 | Stop reason: HOSPADM

## 2024-04-26 RX ORDER — LIDOCAINE HYDROCHLORIDE 20 MG/ML
INJECTION, SOLUTION EPIDURAL; INFILTRATION; INTRACAUDAL; PERINEURAL AS NEEDED
Status: COMPLETED | OUTPATIENT
Start: 2024-04-26 | End: 2024-04-26

## 2024-04-26 RX ORDER — LIDOCAINE HYDROCHLORIDE 20 MG/ML
6 INJECTION, SOLUTION EPIDURAL; INFILTRATION; INTRACAUDAL; PERINEURAL ONCE
Status: DISCONTINUED | OUTPATIENT
Start: 2024-04-26 | End: 2024-04-27 | Stop reason: HOSPADM

## 2024-04-26 RX ORDER — BUPIVACAINE HYDROCHLORIDE 5 MG/ML
INJECTION, SOLUTION PERINEURAL AS NEEDED
Status: COMPLETED | OUTPATIENT
Start: 2024-04-26 | End: 2024-04-26

## 2024-04-26 RX ORDER — METHYLPREDNISOLONE ACETATE 40 MG/ML
INJECTION, SUSPENSION INTRA-ARTICULAR; INTRALESIONAL; INTRAMUSCULAR; SOFT TISSUE AS NEEDED
Status: COMPLETED | OUTPATIENT
Start: 2024-04-26 | End: 2024-04-26

## 2024-04-26 RX ADMIN — LIDOCAINE HYDROCHLORIDE 6 ML: 20 INJECTION, SOLUTION EPIDURAL; INFILTRATION; INTRACAUDAL; PERINEURAL at 14:00

## 2024-04-26 RX ADMIN — BUPIVACAINE HYDROCHLORIDE 2 ML: 5 INJECTION, SOLUTION PERINEURAL at 14:01

## 2024-04-26 RX ADMIN — IOHEXOL 3 ML: 300 INJECTION, SOLUTION INTRAVENOUS at 14:00

## 2024-04-26 RX ADMIN — METHYLPREDNISOLONE ACETATE 40 MG: 40 INJECTION, SUSPENSION INTRA-ARTICULAR; INTRALESIONAL; INTRAMUSCULAR; INTRASYNOVIAL; SOFT TISSUE at 14:01

## 2024-04-26 ASSESSMENT — PAIN SCALES - GENERAL
PAINLEVEL_OUTOF10: 2
PAINLEVEL_OUTOF10: 0 - NO PAIN

## 2024-04-26 ASSESSMENT — PAIN - FUNCTIONAL ASSESSMENT
PAIN_FUNCTIONAL_ASSESSMENT: 0-10
PAIN_FUNCTIONAL_ASSESSMENT: 0-10

## 2024-04-26 ASSESSMENT — PAIN DESCRIPTION - DESCRIPTORS: DESCRIPTORS: ACHING

## 2024-04-26 NOTE — ADDENDUM NOTE
Encounter addended by: Mel Reddy RN on: 4/26/2024 2:28 PM   Actions taken: LDA properties accepted, Flowsheet accepted, Clinical Note Signed

## 2024-04-26 NOTE — H&P
History Of Present Illness  Nusrat Irizarry is a 63 y.o. female presenting with pain.     Past Medical History  Past Medical History:   Diagnosis Date    Acute renal insufficiency 07/12/2023    Breast cancer (Multi)     Effusion of left knee 08/08/2023    Other specified postprocedural states     History of Papanicolaou smear    Parkinson's disease (tremor, stiffness, slow motion, unstable posture) (Multi)     Personal history of colonic polyps     Personal history of colonic polyps    Personal history of other diseases of the circulatory system     History of hypertension    Personal history of other endocrine, nutritional and metabolic disease     History of hypothyroidism    Personal history of other endocrine, nutritional and metabolic disease     History of hyperglycemia    Personal history of other malignant neoplasm of skin     History of malignant neoplasm of skin    Personal history of other medical treatment     History of screening mammography       Surgical History  Past Surgical History:   Procedure Laterality Date    APPENDECTOMY      Appendectomy    BREAST LUMPECTOMY Right     CARDIAC SURGERY  2013    REPAIR OF HOLE IN HEART    COLONOSCOPY  11/18/2019    NORMAL; H/O COLONIC POLYPS IN PAST-REPEAT IN 5 YEARS    CT ANGIO CORONARY ART WITH HEARTFLOW IF SCORE >30%  09/16/2021    CT HEART CORONARY ANGIOGRAM 9/16/2021 [facility] ANCILLARY LEGACY    HYSTERECTOMY  04/16/2018    [facility]    INCISIONAL BREAST BIOPSY      Incisional Breast Biopsy    IR INJECTION EPIDURAL STEROID N/A 03/01/2024    L5-S1    IR INJECTION NERVE BLOCK Bilateral 12/15/2023    Bilat L2/3 and L3/4 MBB    LYMPHADENECTOMY      axillary    OTHER SURGICAL HISTORY  10/02/2018    Bunion Correction By Osborne Procedure    OTHER SURGICAL HISTORY  01/05/2023    Intra-articular corticosteroid injection    OTHER SURGICAL HISTORY  01/21/2022    Epidural steroid injection    OTHER SURGICAL HISTORY  12/08/2021    Radiofrequency ablation     OTHER SURGICAL HISTORY  08/23/2021    Medial branch block    OTHER SURGICAL HISTORY  09/01/2021    Carpal tunnel surgery    OTHER SURGICAL HISTORY  09/23/2021    Medial branch block    OTHER SURGICAL HISTORY  06/17/2022    Epidural steroid injection    OTHER SURGICAL HISTORY  11/17/2022    Epidural steroid injection    TONSILLECTOMY  1968    Tonsillectomy With Adenoidectomy    TUBAL LIGATION  1993    Tubal Ligation        Social History  She reports that she has never smoked. She has never used smokeless tobacco. She reports current alcohol use. She reports that she does not use drugs.    Family History  Family History   Problem Relation Name Age of Onset    Heart disease Mother      Heart failure Father      Hyperthyroidism Sister      Lung cancer Sister      Breast cancer Other GRANDPARENT         Allergies  Bee venom protein (honey bee) and Zoledronic acid    Review of Systems   All other systems reviewed and are negative.       Physical Exam     Last Recorded Vitals  There were no vitals taken for this visit.    Relevant Results        Rrr, nonlabored breathing     Assessment/Plan   Active Problems:  There are no active Hospital Problems.      injection       I spent   minutes in the professional and overall care of this patient.      Nicho Farah, DO

## 2024-04-26 NOTE — PERIOPERATIVE NURSING NOTE
Discharge instructions reviewed by   kenny DIAS RN  no questions and verbalized understanding. Pt discharged amb to exit steady gait,  to drive  herself home, tricia well

## 2024-04-26 NOTE — ADDENDUM NOTE
Encounter addended by: Mel Reddy RN on: 4/26/2024 2:45 PM   Actions taken: Check Out activity completed

## 2024-04-26 NOTE — OP NOTE
* No procedures listed * Operative Note     Date: 2024  OR Location: [place] [address] OR    Name: Nusrat Irizarry, : 1960, Age: 63 y.o., MRN: [id number], Sex: female    Diagnosis  * No Diagnosis Codes entered * * No Diagnosis Codes entered *     Procedures  * No procedures documented on diagnosis form *    Surgeons   * No surgeons found in log *    Resident/Fellow/Other Assistant:  * No surgeons found in log *    Procedure Summary  Anesthesia: * No anesthesia type entered *  ASA: ASA status not filed in the log.  Anesthesia Staff: No anesthesia staff entered.  Estimated Blood Loss: 0mL  Intra-op Medications: * Intraprocedure medication information is unavailable because the case start and end events have not been set *      Intraprocedure I/O Totals       None           Specimen: No specimens collected     Staff:   Circulator: Lucila Spencer RN; Patti Lopez RN  Scrub Person: Kaylie Bae         Drains and/or Catheters: * None in log *    Tourniquet Times:         Implants:     Findings: none    Indications: Nusrat Irizarry is an 63 y.o. female who is having surgery for * No pre-op diagnosis entered *. M46.1, sacroiliitis    The patient was seen in the preoperative area. The risks, benefits, complications, treatment options, non-operative alternatives, expected recovery and outcomes were discussed with the patient. The possibilities of reaction to medication, pulmonary aspiration, injury to surrounding structures, bleeding, recurrent infection, the need for additional procedures, failure to diagnose a condition, and creating a complication requiring transfusion or operation were discussed with the patient. The patient concurred with the proposed plan, giving informed consent.  The site of surgery was properly noted/marked if necessary per policy. The patient has been actively warmed in preoperative area. Preoperative antibiotics are not indicated. Venous thrombosis prophylaxis are not  indicated.    Procedure Details:     Diagnosis: M46.1, sacroiliitis    Procedure: Right Diagnostic and therapeutic sacroiliac joint injection under fluoroscopic guidance    Anesthesia: Local    Complications: None      After informed consent was obtained, the patient was brought to the procedure room and placed in the prone position.  The back area was prepped and draped in the usual sterile fashion.  Using fluoroscopic guidance, the skin and subcutaneous tissues overlying needle trajectory to the lower aspect of sacroiliac joint were anesthetized with 0.5% lidocaine.  The 23-gauge spinal needle was then introduced into the lower aspect of sacroiliac joint.  Injection of contrast under live fluoroscopy revealed appropriate intraarticular spread.  Thereafter, 2 mL of 0.5% bupivacaine and 40 mg of methylprednisolone were injected.  The needle was removed.  The patient tolerated the procedure well.  Patient was then transferred to the recovery room in stable condition.     FOLLOW UP:  The patient will update us on their response to this procedure, and agrees to continue currently prescribed/recommended therapies   Complications:  None; patient tolerated the procedure well.    Disposition:  home  Condition: stable         Additional Details: none    Attending Attestation:     *No primary surgeon found*

## 2024-04-26 NOTE — Clinical Note
Pt arrived to OR on cart. Transferred to OR table. Safety strap applied. Pt padded with pillows in PRONE position. Pt prepped with Chloraprep BY DR. OLGUIN. C-ARM IN USE. Bandaids applied to injection sites. Pt. Transferred to ACS on cart.

## 2024-04-26 NOTE — ADDENDUM NOTE
Encounter addended by: Mel Reddy RN on: 4/26/2024 2:29 PM   Actions taken: Check Out activity completed

## 2024-04-29 ENCOUNTER — OFFICE VISIT (OUTPATIENT)
Dept: HEMATOLOGY/ONCOLOGY | Facility: CLINIC | Age: 64
End: 2024-04-29
Payer: MEDICARE

## 2024-04-29 VITALS
HEART RATE: 82 BPM | SYSTOLIC BLOOD PRESSURE: 109 MMHG | TEMPERATURE: 97.7 F | OXYGEN SATURATION: 95 % | WEIGHT: 237.4 LBS | DIASTOLIC BLOOD PRESSURE: 74 MMHG | BODY MASS INDEX: 35.16 KG/M2 | RESPIRATION RATE: 16 BRPM

## 2024-04-29 DIAGNOSIS — G62.9 NEUROPATHY: ICD-10-CM

## 2024-04-29 DIAGNOSIS — R20.2 NUMBNESS AND TINGLING IN BOTH HANDS: ICD-10-CM

## 2024-04-29 DIAGNOSIS — R20.0 NUMBNESS AND TINGLING IN BOTH HANDS: ICD-10-CM

## 2024-04-29 PROCEDURE — 3074F SYST BP LT 130 MM HG: CPT

## 2024-04-29 PROCEDURE — 1125F AMNT PAIN NOTED PAIN PRSNT: CPT

## 2024-04-29 PROCEDURE — 3008F BODY MASS INDEX DOCD: CPT

## 2024-04-29 PROCEDURE — 99213 OFFICE O/P EST LOW 20 MIN: CPT

## 2024-04-29 PROCEDURE — 1160F RVW MEDS BY RX/DR IN RCRD: CPT

## 2024-04-29 PROCEDURE — 3078F DIAST BP <80 MM HG: CPT

## 2024-04-29 PROCEDURE — 1159F MED LIST DOCD IN RCRD: CPT

## 2024-04-29 ASSESSMENT — PAIN SCALES - GENERAL: PAINLEVEL: 4

## 2024-04-29 ASSESSMENT — PATIENT HEALTH QUESTIONNAIRE - PHQ9
SUM OF ALL RESPONSES TO PHQ9 QUESTIONS 1 AND 2: 0
1. LITTLE INTEREST OR PLEASURE IN DOING THINGS: NOT AT ALL
2. FEELING DOWN, DEPRESSED OR HOPELESS: NOT AT ALL

## 2024-04-29 NOTE — PROGRESS NOTES
6 mo follow up  10/28 1030 with CNP  Reviewed AVS with patient- patient verbalizes understanding

## 2024-04-29 NOTE — PROGRESS NOTES
Patient ID: Misty Viera is a 63 y.o. female.    Subjective    HPI    Patient ID: Misty Viera is a 63 y.o. female.        Subjective   HPI  History of Present Illness:      ID Statement:    MISTY VIERA is a 62 year old Female        Chief Complaint: Evaluation for breast cancer   Interval History:    Referred by STELLA Spencer     Reason for referral: breast cancer      HPI  61 year old woman with hx of obesity, HTN, angina pectoris, Parkinson’s disease, carpal tunnel syndrome, lumbar spondylosis, HL,  hypothyroidism, mitral valve prolapse neuropathy, pericardial effusion, ASD s/p closure, lung nodule on CT of the chest in June 2021 presenting for evaluation of breast cancer   Hx of hysterectomy / post menopausal   Breast cancer hx   March 2020: abnormal mammogram followed by US showing 1.6 cm mass in right breast at 5 o’clock , biopsy showed IDC   Surgery with Dr. Mo in [place] in march 2020: right lumpectomy and SLNBx: IDC, grade II, ER positive (99%), GA positive (5%), Her2 negative, final size 1.8 cm, margins negative  (closest margin 4 mm), DCIS present 1 LN with micrometasis deposit of 1 mm.   Oncotype score of 29  Received AC (adriamycin, cyclophosphamide) x 2 cycles in April/May 2020 with multiple complications so did not complete chemotherapy , she had low BP and a lot of side effects and dehydration, thrush   she is still on mid  Received radiation 7/14/20- 8/24/20   Started femara afterwards around Sep 2020  Last mmg march 2021- due march 2022   On zometa Q 6 m adjuvant, dexa due April 2022      last Zometa on 12/30/21      interval history - 4/29/24     Chronic Fatigue, remains about the same, ongoing since chemo treatment   continues have hot flashes, tolerable   remains taking Femara, tolerating well      Possible repeat back ablation, currently being evaluated   chronic joint pain, is currently on gabapentin 300mg 4 times daily, helps manage pain  follows with pain  management      Tremors increasing, recently placed entacapone   No recent falls   Balance remains slightly off, no new falls   No nose bleeds or abnormal bleeding   Bruising on elbows     no fever or infections   No new breast concerns   No urinary issues   No UTIs or yeast infections, or vaginal bleeding  No resting SOB   No cough  No chest pain or pressure   Chronic constipation, under control with bowel regimen of miralax, and two prunes nightly   Next colonoscopy due next year      No breast concerns, changes, no nipple discharge      PAST MEDICAL HISTORY:   obesity, HTN, angina pectoris, Parkinson’s disease, carpal tunnel syndrome, lumbar spondylosis, HL, hypothyroidism, mitral valve prolapse neuropathy, pericardial effusion, ASD s/p closure     SOCIAL HISTORY:   never smoker   no children   lives with her    she was  for insurance agency      FAMILY HISTORY:    maternal grandmother had breast cancer in her 60s   sister had lung cancer, non smoker   No other specific history of bleeding, clotting or malignant disorder in the family.  Objective    BSA: There is no height or weight on file to calculate BSA.  LMP  (LMP Unknown)      Physical Exam  Vitals and nursing note reviewed.   Constitutional:       Appearance: Normal appearance.   HENT:      Head: Normocephalic and atraumatic.      Mouth/Throat:      Pharynx: Oropharynx is clear.   Eyes:      General: No scleral icterus.     Extraocular Movements: Extraocular movements intact.      Conjunctiva/sclera: Conjunctivae normal.   Cardiovascular:      Rate and Rhythm: Normal rate and regular rhythm.      Pulses: Normal pulses.      Heart sounds: Normal heart sounds.   Pulmonary:      Effort: Pulmonary effort is normal.      Breath sounds: Normal breath sounds.   Abdominal:      General: There is no distension.      Palpations: Abdomen is soft. There is no mass.      Tenderness: There is no abdominal tenderness.   Musculoskeletal:          General: Normal range of motion.      Cervical back: Normal range of motion and neck supple.   Skin:     General: Skin is warm and dry.   Neurological:      General: No focal deficit present.      Mental Status: She is alert and oriented to person, place, and time.      Motor: No weakness.   Psychiatric:         Mood and Affect: Mood normal.         Behavior: Behavior normal.         Thought Content: Thought content normal.         Judgment: Judgment normal.       Performance Status:  Symptomatic; fully ambulatory      Assessment/Plan      Assessment:    1. Right breast cancer   [id number] right breast cancer, ER positive , MN weekly positive and her 2 negative   high risk oncotype of 29  s/p lumpectomy , 2 cycles of AC with poor tolerance and adjuvant radiation   on AI with femara since Aug/Sep 2020   continue for minimum 5 yrs   she is having body aches and joint pains and would like to try and wean down gabapentin as she doesn't know if it actually helping , however would like to continue same therapy now, will monitor and consider alternative AI or toro if worse pain   mmg due march 2022   dexa scan due April 2022 7/28/22- she still has myalgias and arthralgias but overall no major changes, plan to continue Femara   her dexa scan April 2022 showed osteopenia and decreased BMD, continue Zometa and we discussed switch to Tamoxifen if further BMD loss   MMG was benign in April, will schedule next yr   no new clinical concerns otherwise today      1/12/23- she is tolerating AI with Femara fairly  her arthralgias are controlled in general   plan to continue with same for 5 yrs at least   MMG schedule in April 2023, she was instructed to call for results      6/29/23: remains taking Femara, tolerating with no new concerns  continues to have fatigue, and body aches managed with gabapentin   discussed continuation with Femara vs. trialing a different AI,   pt desires to proceed with Femara at this time  completes 3  years in Aug/Sep., plan to continue for a duration of 5 yrs  continue surveillance with yearly MMG  MMG and Dexa scan will be due April 2024 1/4/24:  Completed 3 years on Femara, remains having hot flashes, fatigue, myalgias/arthralgias - unchanged  Plan is continuation for a duration of 5 years  Patient has complete zometa  MMG and bone density are due in April 2. Bone health   continue Zometa adjuvant dosing 4 mg Q 6 m for 3 yrs , will give today    dexa scan April 2022 with osteropenia, repeat in 2 yrs     she had received ~ 1-2 yrs of Zometa at OSH so next dose will be last dose      6/29/23 will commence Zometa treatment today. patient meets parameters to treat today.     1/4/24- repeat bone density due in April 2024 4/26/24- Bone density   Spinal L1-L4/bone density 1.283, T-score 0.7, Z-score 2.3  Left femur total-bone density 0.950, T-score -0.5, Z-score 0.8  Left femur neck-bone density 0.830, T-score -1.5, Z-score 0.0  Right femur total-bone density 0.929, T-score -0.6, Z-score 0.6  Right femur neck-bone density 0.796, T-score -1.7, Z-score -0.3  10-year fracture risk, major osteoporotic fracture risk 9.1, hip fracture 1.1  Classification is low bone mass (osteopenia) follow-up recommended in 2 years     3. Pericardial effusion hx   seems to have happened post her chemo   she followed cardiology for monitor and has a follow up in feb 2022      4. Lung nodule   CT scan showed fibrotic changes and stable sized nodule     6/29/23- schedule repeat CT scan prior to next follow-up     1/4/24 Repeat CT scan reported unchanged pulmonary nodules-   measuring up to 0.3 cm as compared with exams dating back to  06/10/2021. No new or enlarging discrete suspicious pulmonary nodule  Stable postradiation fibrosis within anterior right upper and middle lobes   No new acute cardiopulmonary process.  Stable postoperative changes of right breast lumpectomy and right  axillary lymph node dissection without evidence  of disease recurrence.     Discussed repeating in 6 months, then can be repeated annually.         5. Fatigue   will check TSH, b12, folic acid, ferritin      RTC 6 m        Lynda Covarrubias, APRN-CNP

## 2024-05-04 PROCEDURE — RXMED WILLOW AMBULATORY MEDICATION CHARGE

## 2024-05-08 ENCOUNTER — PHARMACY VISIT (OUTPATIENT)
Dept: PHARMACY | Facility: CLINIC | Age: 64
End: 2024-05-08
Payer: MEDICARE

## 2024-05-15 ENCOUNTER — HOSPITAL ENCOUNTER (OUTPATIENT)
Dept: RADIOLOGY | Facility: EXTERNAL LOCATION | Age: 64
Discharge: HOME | End: 2024-05-15
Payer: MEDICARE

## 2024-05-16 PROCEDURE — RXMED WILLOW AMBULATORY MEDICATION CHARGE

## 2024-05-17 ENCOUNTER — PHARMACY VISIT (OUTPATIENT)
Dept: PHARMACY | Facility: CLINIC | Age: 64
End: 2024-05-17
Payer: COMMERCIAL

## 2024-05-21 ENCOUNTER — OFFICE VISIT (OUTPATIENT)
Dept: PAIN MEDICINE | Facility: CLINIC | Age: 64
End: 2024-05-21
Payer: MEDICARE

## 2024-05-21 VITALS
HEART RATE: 85 BPM | SYSTOLIC BLOOD PRESSURE: 100 MMHG | RESPIRATION RATE: 14 BRPM | DIASTOLIC BLOOD PRESSURE: 62 MMHG | BODY MASS INDEX: 34.95 KG/M2 | WEIGHT: 236 LBS

## 2024-05-21 DIAGNOSIS — M53.3 CHRONIC SI JOINT PAIN: ICD-10-CM

## 2024-05-21 DIAGNOSIS — G89.29 CHRONIC BILATERAL LOW BACK PAIN, UNSPECIFIED WHETHER SCIATICA PRESENT: ICD-10-CM

## 2024-05-21 DIAGNOSIS — G89.29 CHRONIC SI JOINT PAIN: ICD-10-CM

## 2024-05-21 DIAGNOSIS — M54.16 LUMBAR RADICULOPATHY: ICD-10-CM

## 2024-05-21 DIAGNOSIS — M47.816 LUMBAR ARTHROPATHY: Primary | ICD-10-CM

## 2024-05-21 DIAGNOSIS — M48.062 SPINAL STENOSIS, LUMBAR REGION WITH NEUROGENIC CLAUDICATION: ICD-10-CM

## 2024-05-21 DIAGNOSIS — M54.50 CHRONIC BILATERAL LOW BACK PAIN, UNSPECIFIED WHETHER SCIATICA PRESENT: ICD-10-CM

## 2024-05-21 PROCEDURE — 99214 OFFICE O/P EST MOD 30 MIN: CPT | Performed by: PHYSICIAN ASSISTANT

## 2024-05-21 RX ORDER — LIDOCAINE HYDROCHLORIDE 20 MG/ML
10 INJECTION, SOLUTION EPIDURAL; INFILTRATION; INTRACAUDAL; PERINEURAL ONCE
OUTPATIENT
Start: 2024-05-21 | End: 2024-05-21

## 2024-05-21 ASSESSMENT — ENCOUNTER SYMPTOMS
HEMATOLOGIC/LYMPHATIC NEGATIVE: 1
ARTHRALGIAS: 1
ENDOCRINE NEGATIVE: 1
CONSTITUTIONAL NEGATIVE: 1
NEUROLOGICAL NEGATIVE: 1
PSYCHIATRIC NEGATIVE: 1
BACK PAIN: 1
ALLERGIC/IMMUNOLOGIC NEGATIVE: 1
RESPIRATORY NEGATIVE: 1
MYALGIAS: 1
EYES NEGATIVE: 1
GASTROINTESTINAL NEGATIVE: 1
CARDIOVASCULAR NEGATIVE: 1

## 2024-05-21 NOTE — PROGRESS NOTES
Subjective   Patient ID: Nusrat Irizarry is a 63 y.o. female who presents for Follow-up (FUV for Rt SIJ on 4/26/24 she reports 75% relief with the injection. Today reports having pain in her bilat lower back that is causing her to walk different and making the lt side hurt as well, rates her pain score 3/10 now sitting and7-8/10 with activity. She is taking Gabapentin, Tylenol and Ibuprofen to help with the pain, she stretches daily before getting out of bed so she can move and rests when the pain is bad.  ) ELIZABETH score 62%, ORT score 0, MMA done.     Mel Reddy RN 05/21/24 9:25 AM     Patient is a 63-year-old female with a past medical history significant for sacroiliitis, lumbar spondylosis, lumbar stenosis, lumbar degenerative disc disease, lumbar disc bulge, lumbar neuritis and chronic back pain.   She presents today for follow-up after undergoing right-sided sacroiliac joint injection.  This was done on 4/26/2024 and has given her 75% relief.  Her buttock pain is better.  Prior to that she underwent L5-S1 epidural steroid injection.  This was done on 3/1/2024 and has given her 100% relief of her left-sided pain.  Unfortunate, she is noting back pain once again.  It is worse with standing.  Worse with being active.  It is a 3/10 with sitting but an 8/10 with any sort of activity.  Historically she has undergone bilateral L4-5 and L5-S1 facet RFA. This was done on 6/23/2023 and got 75% relief but does feel that this pain has started to return.  She has tried stretching at home that has not helped.  She has trialed over-the-counter medications that have not helped.        Review of Systems   Constitutional: Negative.    HENT: Negative.     Eyes: Negative.    Respiratory: Negative.     Cardiovascular: Negative.    Gastrointestinal: Negative.    Endocrine: Negative.    Genitourinary: Negative.    Musculoskeletal:  Positive for arthralgias, back pain and myalgias.   Skin: Negative.    Allergic/Immunologic:  Negative.    Neurological: Negative.    Hematological: Negative.    Psychiatric/Behavioral: Negative.         Objective   Physical Exam  Vitals and nursing note reviewed.   Constitutional:       Appearance: Normal appearance.   HENT:      Head: Normocephalic.      Mouth/Throat:      Pharynx: Oropharynx is clear.   Eyes:      Pupils: Pupils are equal, round, and reactive to light.   Cardiovascular:      Rate and Rhythm: Normal rate and regular rhythm.      Pulses: Normal pulses.      Heart sounds: Normal heart sounds.   Pulmonary:      Effort: Pulmonary effort is normal.   Musculoskeletal:         General: Normal range of motion.      Cervical back: Normal range of motion.      Comments: 5/5 strength  Pain with compression of the bilateral lumbar facet joints  Increased lower back pain with facet loading   Skin:     General: Skin is warm and dry.   Neurological:      General: No focal deficit present.      Mental Status: She is alert and oriented to person, place, and time.   Psychiatric:         Mood and Affect: Mood normal.         Behavior: Behavior normal.         Thought Content: Thought content normal.         Judgment: Judgment normal.         MR lumbar spine wo IV contrast  Status: Final result     PACS Images     Show images for MR lumbar spine wo IV contrast  Signed by    Signed Time Phone Pager   Ban Capellan DO 10/10/2023 09:34 [phone]     Exam Information    Status Exam Begun Exam Ended   Final 10/09/2023 17:19 10/09/2023 17:40     Study Result    Narrative & Impression   Interpreted By:  Ban Capellan,   STUDY:  MR LUMBAR SPINE WO IV CONTRAST;  10/9/2023 5:40 pm      INDICATION:  Signs/Symptoms: lower back and butt/leg pain  M47.816: Lumbar  arthropathy M54.16: Chronic radicular low back pain G89.29: .      COMPARISON:  December 28, 2021      ACCESSION NUMBER(S):  [id number]      ORDERING CLINICIAN:  LM ALFARO      TECHNIQUE:  Sagittal T1, T2, STIR, axial T1 and T2 weighted  images of the lumbar  spine were acquired.      FINDINGS:  Alignment: There is again subtle, grade 1 retrolisthesis of L5 on S1  and L3 on L4.      Vertebrae/Intervertebral Discs: There is again loss of height and  anterior wedging of L3. A Schmorl's node is again seen along the  superior endplate of T12. Otherwise, the vertebral body heights are  preserved. There are again mild degenerative endplate signal changes.  There is persistent disc desiccation and multilevel degenerative  endplate spurring. Loss of disc height remains most pronounced  posteriorly at L2-3 and L3-4.      Conus: The lower thoracic cord appears unremarkable. The conus  terminates at L2.      T12-L1:  There is minimal disc bulging as well as right greater than  left degenerative facet arthropathy. There is no significant  narrowing of the spinal canal or neuroforamina. L1-2: Mild facet and  ligamentum flavum hypertrophy and subtle disc bulging do not narrow  the spinal canal. The neuroforamina are patent. L2-3: Facet and  ligamentum flavum hypertrophy are again noted impressing on the  dorsal thecal sac. There is also prominent posterior epidural fat.  There is circumferential disc bulging and endplate spurring with  superimposed left paracentral/subarticular disc extrusion, new from  the previous examination. There is now effacement of the left lateral  recess and marked mass effect on the thecal sac with crowding of  nerve roots of the cauda equina. There is moderate overall narrowing  of the spinal canal . There is moderate left and mild right-sided  neuroforaminal narrowing due to an intraforaminal component of the  disc herniation which abuts and appears to contact the exiting left  L2 nerve root. L3-4: Facet and ligamentum flavum hypertrophy,  prominent posterior epidural fat, and circumferential disc bulging  and endplate spurring are again noted. There is a broad-based disc  protrusion asymmetrically more pronounced to the left  contributing to  narrowing of the left lateral recess and flattening of the ventral  thecal sac, new from the previous examination. However, the more  focal central disc protrusion seen on the previous examination is now  much less conspicuous. There is moderate to severe left and mild  right-sided neuroforaminal stenosis. Intraforaminal disc extrusion on  the left is again seen producing mass effect on the exiting left L3  nerve root. L4-5: Facet and ligamentum flavum hypertrophy and  circumferential disc bulging contribute to narrowing of the lateral  recesses, left greater than right with moderate, left greater than  right neuroforaminal narrowing. There is no significant narrowing of  the spinal canal. The findings are relatively similar from the  previous exam. L5-S1: Left greater than right facet and ligamentum  flavum hypertrophy and circumferential disc bulging and endplate  spurring are again demonstrated. There is a right subarticular disc  protrusion effacing the right lateral recess and contacting and  slightly displacing the right S1 nerve root, new from the prior  study. There is also a small left subarticular inferior disc  extrusion narrowing the left lateral recess and contacting the left  S1 nerve root. There is no significant narrowing of the spinal canal.  There is mild right and no significant left-sided neuroforaminal  stenosis.      There are degenerative changes of the sacroiliac joints.      There are partially imaged renal lesions favored to represent cysts.      IMPRESSION:      1. Left paracentral/subarticular and intraforaminal disc extrusion at  L2-3 effaces the left lateral recess and produces mass effect on the  thecal sac and crowding of nerve roots of the cauda equina. L5-S1  subarticular disc herniations are new from the previous examination  and contribute to narrowing of the lateral recesses and at least  contact the S1 nerve roots with displacement of the 1 on the right.  2.  Multilevel degenerative changes of the lumbar spine.  3. Remote appearing loss of height of L3.          MACRO:  None      Signed by: Ban Capellan 10/10/2023 9:34 AM  Dictation workstation:   [id number]     Assessment/Plan   Diagnoses and all orders for this visit:  Lumbar arthropathy  -     Radiofrequency Ablation; Future  Lumbar radiculopathy  Spinal stenosis, lumbar region with neurogenic claudication  Chronic SI joint pain  Chronic bilateral low back pain, unspecified whether sciatica present  Other orders  -     Vital Signs; Standing  -     Vital Signs; Standing  -     POCT glucose meter docked device; Standing  -     Height and weight; Standing  -     FL less than 1 hour; Standing  -     lidocaine PF (Xylocaine) 20 mg/mL (2 %) injection 200 mg  -     lidocaine PF (Xylocaine) 20 mg/mL (2 %) injection 200 mg       Patient is a 63-year-old female with a past medical history significant for sacroiliitis, lumbar spondylosis, lumbar stenosis, lumbar degenerative disc disease, lumbar disc bulge, lumbar neuritis and chronic back pain.   She presents today for follow-up after undergoing right-sided sacroiliac joint injection.  This was done on 4/26/2024 and has given her 75% relief.  Prior to that she underwent L5-S1 epidural steroid injection.  This was done on 3/1/2024 and has given her 100% relief of her left-sided pain.  Historically she has undergone previous facet RFA. This was done on 6/23/2023 and got 75% relief but this back pain has started to return.  On physical examination her pain appears facet mediated.  Reviewed the imaging.  Based on her imaging findings, her pain pattern, does significant relief about from the previous ablation and her failure with conservative treatments I recommended once again bilateral L3-5 medial branch RFA covering the L4-S1 facet joints.  Procedure was discussed.  Risks and benefits were discussed.  Patient will follow-up 3 weeks after the RFA for reevaluation.  Call the  clinic sooner if necessary.  Diagnosis-M47.816-lumbar spondylosis.  She will need to hold her vitamins for 1 week and her aspirin for 6 days before the procedure.

## 2024-05-22 PROCEDURE — RXMED WILLOW AMBULATORY MEDICATION CHARGE

## 2024-05-23 ENCOUNTER — PHARMACY VISIT (OUTPATIENT)
Dept: PHARMACY | Facility: CLINIC | Age: 64
End: 2024-05-23
Payer: MEDICARE

## 2024-05-23 ENCOUNTER — TELEPHONE (OUTPATIENT)
Dept: PAIN MEDICINE | Facility: CLINIC | Age: 64
End: 2024-05-23
Payer: MEDICARE

## 2024-05-29 ENCOUNTER — TELEPHONE (OUTPATIENT)
Dept: PAIN MEDICINE | Facility: CLINIC | Age: 64
End: 2024-05-29

## 2024-05-29 NOTE — TELEPHONE ENCOUNTER
Patient is scheduled to have a Lumbar RFA with Dr. Farah in June.  Is it acceptable to patient to hold Aspirin for 6 days pre and 24 hours post?  Please advise.  Thanks!

## 2024-06-16 DIAGNOSIS — E55.9 VITAMIN D DEFICIENCY: Primary | ICD-10-CM

## 2024-06-16 PROCEDURE — RXMED WILLOW AMBULATORY MEDICATION CHARGE

## 2024-06-17 RX ORDER — ERGOCALCIFEROL 1.25 MG/1
1 CAPSULE ORAL
Qty: 4 CAPSULE | Refills: 11 | Status: SHIPPED | OUTPATIENT
Start: 2024-06-23 | End: 2025-06-23

## 2024-06-18 PROCEDURE — RXMED WILLOW AMBULATORY MEDICATION CHARGE

## 2024-06-21 ENCOUNTER — PHARMACY VISIT (OUTPATIENT)
Dept: PHARMACY | Facility: CLINIC | Age: 64
End: 2024-06-21
Payer: COMMERCIAL

## 2024-06-22 ENCOUNTER — PHARMACY VISIT (OUTPATIENT)
Dept: PHARMACY | Facility: CLINIC | Age: 64
End: 2024-06-22
Payer: MEDICARE

## 2024-06-22 PROCEDURE — RXMED WILLOW AMBULATORY MEDICATION CHARGE

## 2024-06-28 ENCOUNTER — HOSPITAL ENCOUNTER (OUTPATIENT)
Dept: OPERATING ROOM | Facility: HOSPITAL | Age: 64
Setting detail: OUTPATIENT SURGERY
Discharge: HOME | End: 2024-06-28
Payer: MEDICARE

## 2024-06-28 VITALS
RESPIRATION RATE: 16 BRPM | HEIGHT: 69 IN | BODY MASS INDEX: 34.07 KG/M2 | OXYGEN SATURATION: 95 % | WEIGHT: 230 LBS | HEART RATE: 68 BPM | DIASTOLIC BLOOD PRESSURE: 78 MMHG | TEMPERATURE: 97.7 F | SYSTOLIC BLOOD PRESSURE: 116 MMHG

## 2024-06-28 DIAGNOSIS — M47.816 LUMBAR ARTHROPATHY: ICD-10-CM

## 2024-06-28 DIAGNOSIS — M47.816 SPONDYLOSIS WITHOUT MYELOPATHY OR RADICULOPATHY, LUMBAR REGION: ICD-10-CM

## 2024-06-28 PROCEDURE — 2500000005 HC RX 250 GENERAL PHARMACY W/O HCPCS: Performed by: STUDENT IN AN ORGANIZED HEALTH CARE EDUCATION/TRAINING PROGRAM

## 2024-06-28 PROCEDURE — 2720000007 HC OR 272 NO HCPCS

## 2024-06-28 RX ORDER — LIDOCAINE HYDROCHLORIDE 20 MG/ML
INJECTION, SOLUTION EPIDURAL; INFILTRATION; INTRACAUDAL; PERINEURAL AS NEEDED
Status: COMPLETED | OUTPATIENT
Start: 2024-06-28 | End: 2024-06-28

## 2024-06-28 ASSESSMENT — PAIN - FUNCTIONAL ASSESSMENT
PAIN_FUNCTIONAL_ASSESSMENT: 0-10

## 2024-06-28 ASSESSMENT — PAIN SCALES - GENERAL
PAINLEVEL_OUTOF10: 0 - NO PAIN
PAINLEVEL_OUTOF10: 0 - NO PAIN
PAINLEVEL_OUTOF10: 3

## 2024-06-28 ASSESSMENT — PAIN DESCRIPTION - DESCRIPTORS
DESCRIPTORS: ACHING
DESCRIPTORS: ACHING

## 2024-06-28 NOTE — Clinical Note
ARRIVED TO ROOM PER CART AND MOVED SELF TO TABLE.  PATIENT IN PRONE POSITION PADDED WITH PILLOWS, PREPPED WITH TEAL CHLOROPREP PER DR OLGUIN, FIRE RISK SCORE 3 FOR OPEN OXYGEN/ALCOHOL PREP AND RFA PROBE, 3 MINUTE DRY TIME OBSERVED BEFORE DRAPING.  RFA UNIT  SET TO 80 DEGREES C FOR 80 SECONDS X 2 BURNS, GROUNDING PAD SITE CLEAR BEFORE AND AFTER PROCEDURE.  OPSITE DRESSED WITH BANDAID, REPORT GIVEN TO YESSI CARDOSO RN.

## 2024-06-28 NOTE — PERIOPERATIVE NURSING NOTE
Patient sitting on side of bed.  Discharge instructions reviewed and signed with patient.  Patient tolerated well.

## 2024-06-28 NOTE — PERIOPERATIVE NURSING NOTE
Patient discharged to surgical waiting room.  [place] steady.  Patient tolerated well.  Discharge instructions reviewed with patient.

## 2024-06-28 NOTE — H&P
History Of Present Illness  Nusrat Irizarry is a 63 y.o. female presenting with back pain.     Past Medical History  Past Medical History:   Diagnosis Date    Acute renal insufficiency 07/12/2023    Breast cancer (Multi)     Effusion of left knee 08/08/2023    Other specified postprocedural states     History of Papanicolaou smear    Parkinson's disease (tremor, stiffness, slow motion, unstable posture) (Multi)     Personal history of colonic polyps     Personal history of colonic polyps    Personal history of other diseases of the circulatory system     History of hypertension    Personal history of other endocrine, nutritional and metabolic disease     History of hypothyroidism    Personal history of other endocrine, nutritional and metabolic disease     History of hyperglycemia    Personal history of other malignant neoplasm of skin     History of malignant neoplasm of skin    Personal history of other medical treatment     History of screening mammography       Surgical History  Past Surgical History:   Procedure Laterality Date    APPENDECTOMY      Appendectomy    BREAST LUMPECTOMY Right     CARDIAC SURGERY  2013    REPAIR OF HOLE IN HEART    COLONOSCOPY  11/18/2019    NORMAL; H/O COLONIC POLYPS IN PAST-REPEAT IN 5 YEARS    CT ANGIO CORONARY ART WITH HEARTFLOW IF SCORE >30%  09/16/2021    CT HEART CORONARY ANGIOGRAM 9/16/2021 [facility] ANCILLARY LEGACY    HYSTERECTOMY  04/16/2018    [facility]    INCISIONAL BREAST BIOPSY      Incisional Breast Biopsy    IR INJECTION EPIDURAL STEROID N/A 03/01/2024    L5-S1    IR INJECTION NERVE BLOCK Bilateral 12/15/2023    Bilat L2/3 and L3/4 MBB    LYMPHADENECTOMY      axillary    OTHER SURGICAL HISTORY  10/02/2018    Bunion Correction By Osborne Procedure    OTHER SURGICAL HISTORY  01/05/2023    Intra-articular corticosteroid injection    OTHER SURGICAL HISTORY  01/21/2022    Epidural steroid injection    OTHER SURGICAL HISTORY  12/08/2021    Radiofrequency  ablation    OTHER SURGICAL HISTORY  08/23/2021    Medial branch block    OTHER SURGICAL HISTORY  09/01/2021    Carpal tunnel surgery    OTHER SURGICAL HISTORY  09/23/2021    Medial branch block    OTHER SURGICAL HISTORY  06/17/2022    Epidural steroid injection    OTHER SURGICAL HISTORY  11/17/2022    Epidural steroid injection    SACROILIAC JOINT INJECTION Right 04/26/2024    Rt SIJ    TONSILLECTOMY  1968    Tonsillectomy With Adenoidectomy    TUBAL LIGATION  1993    Tubal Ligation        Social History  She reports that she has never smoked. She has never used smokeless tobacco. She reports current alcohol use. She reports that she does not use drugs.    Family History  Family History   Problem Relation Name Age of Onset    Heart disease Mother      Heart failure Father      Hyperthyroidism Sister      Lung cancer Sister      Breast cancer Other GRANDPARENT         Allergies  Bee venom protein (honey bee) and Zoledronic acid    Review of Systems   All other systems reviewed and are negative.       Physical Exam     Last Recorded Vitals  There were no vitals taken for this visit.    Relevant Results        Constitutional: No acute distress, well appearing and well nourished. Patient appears stated age.  Eyes:  nonicteric sclerae   ENT: Hearing is grossly intact.  Neck: trachea midline  Head and Face: grossly normal.  Respiratory: nonlabored breathing  Cardiovascular: rate per vitals.  Neuro: alert, moving extremities.       Assessment/Plan   Active Problems:  There are no active Hospital Problems.      Lumbar rfa       I spent   minutes in the professional and overall care of this patient.      Nicho Farah, DO

## 2024-06-28 NOTE — Clinical Note
ARRIVED TO ROOM PER CART AND MOVED SELF TO TABLE.  PATIENT IN PRONE POSITION PADDED WITH PILLOWS, SAFETY STRAP APPLIED, PREPPED WITH TEAL CHLOROPREP PER DR OLGUIN,  FIRE RISK SCORE 2, OPSITE DRESSED WITH BANDAID, REPORT GIVEN TO RITU MINA RN.

## 2024-07-17 DIAGNOSIS — R20.0 NUMBNESS AND TINGLING IN BOTH HANDS: ICD-10-CM

## 2024-07-17 DIAGNOSIS — G62.9 NEUROPATHY: ICD-10-CM

## 2024-07-17 DIAGNOSIS — E55.9 VITAMIN D DEFICIENCY: Primary | ICD-10-CM

## 2024-07-17 DIAGNOSIS — R20.2 NUMBNESS AND TINGLING IN BOTH HANDS: ICD-10-CM

## 2024-07-17 PROCEDURE — RXMED WILLOW AMBULATORY MEDICATION CHARGE

## 2024-07-17 RX ORDER — ERGOCALCIFEROL 1.25 MG/1
1 CAPSULE ORAL
Qty: 4 CAPSULE | Refills: 11 | Status: SHIPPED | OUTPATIENT
Start: 2024-07-21 | End: 2025-07-21

## 2024-07-17 RX ORDER — GABAPENTIN 300 MG/1
300 CAPSULE ORAL 4 TIMES DAILY
Qty: 120 CAPSULE | Refills: 2 | Status: SHIPPED | OUTPATIENT
Start: 2024-07-17

## 2024-07-18 PROCEDURE — RXMED WILLOW AMBULATORY MEDICATION CHARGE

## 2024-07-22 ENCOUNTER — PHARMACY VISIT (OUTPATIENT)
Dept: PHARMACY | Facility: CLINIC | Age: 64
End: 2024-07-22
Payer: MEDICARE

## 2024-07-30 ENCOUNTER — OFFICE VISIT (OUTPATIENT)
Dept: PAIN MEDICINE | Facility: CLINIC | Age: 64
End: 2024-07-30
Payer: MEDICARE

## 2024-07-30 VITALS
BODY MASS INDEX: 33.67 KG/M2 | DIASTOLIC BLOOD PRESSURE: 68 MMHG | HEART RATE: 73 BPM | WEIGHT: 228 LBS | SYSTOLIC BLOOD PRESSURE: 118 MMHG | RESPIRATION RATE: 16 BRPM

## 2024-07-30 DIAGNOSIS — M25.50 POLYARTHRALGIA: ICD-10-CM

## 2024-07-30 DIAGNOSIS — M54.16 CHRONIC RADICULAR LOW BACK PAIN: ICD-10-CM

## 2024-07-30 DIAGNOSIS — M54.50 CHRONIC BILATERAL LOW BACK PAIN, UNSPECIFIED WHETHER SCIATICA PRESENT: ICD-10-CM

## 2024-07-30 DIAGNOSIS — M53.3 CHRONIC SI JOINT PAIN: Primary | ICD-10-CM

## 2024-07-30 DIAGNOSIS — M48.062 SPINAL STENOSIS, LUMBAR REGION WITH NEUROGENIC CLAUDICATION: ICD-10-CM

## 2024-07-30 DIAGNOSIS — G89.29 CHRONIC SI JOINT PAIN: Primary | ICD-10-CM

## 2024-07-30 DIAGNOSIS — G89.29 CHRONIC RADICULAR LOW BACK PAIN: ICD-10-CM

## 2024-07-30 DIAGNOSIS — G89.29 CHRONIC BILATERAL LOW BACK PAIN, UNSPECIFIED WHETHER SCIATICA PRESENT: ICD-10-CM

## 2024-07-30 PROCEDURE — 99214 OFFICE O/P EST MOD 30 MIN: CPT | Performed by: PHYSICIAN ASSISTANT

## 2024-07-30 RX ORDER — METHYLPREDNISOLONE ACETATE 40 MG/ML
40 INJECTION, SUSPENSION INTRA-ARTICULAR; INTRALESIONAL; INTRAMUSCULAR; SOFT TISSUE ONCE
OUTPATIENT
Start: 2024-07-30 | End: 2024-07-30

## 2024-07-30 RX ORDER — SODIUM CHLORIDE, SODIUM LACTATE, POTASSIUM CHLORIDE, CALCIUM CHLORIDE 600; 310; 30; 20 MG/100ML; MG/100ML; MG/100ML; MG/100ML
20 INJECTION, SOLUTION INTRAVENOUS CONTINUOUS
OUTPATIENT
Start: 2024-07-30

## 2024-07-30 RX ORDER — BUPIVACAINE HYDROCHLORIDE 5 MG/ML
2 INJECTION, SOLUTION EPIDURAL; INTRACAUDAL ONCE
OUTPATIENT
Start: 2024-07-30 | End: 2024-07-30

## 2024-07-30 RX ORDER — LIDOCAINE HYDROCHLORIDE 20 MG/ML
6 INJECTION, SOLUTION EPIDURAL; INFILTRATION; INTRACAUDAL; PERINEURAL ONCE
OUTPATIENT
Start: 2024-07-30 | End: 2024-07-30

## 2024-07-30 ASSESSMENT — ENCOUNTER SYMPTOMS
RESPIRATORY NEGATIVE: 1
NUMBNESS: 1
CONSTITUTIONAL NEGATIVE: 1
EYES NEGATIVE: 1
ENDOCRINE NEGATIVE: 1
PSYCHIATRIC NEGATIVE: 1
CARDIOVASCULAR NEGATIVE: 1
ARTHRALGIAS: 1
MYALGIAS: 1
WEAKNESS: 1
ALLERGIC/IMMUNOLOGIC NEGATIVE: 1
BACK PAIN: 1
GASTROINTESTINAL NEGATIVE: 1
HEMATOLOGIC/LYMPHATIC NEGATIVE: 1

## 2024-07-30 NOTE — H&P (VIEW-ONLY)
"Subjective   Patient ID: Nusrat Irizarry is a 63 y.o. female who presents for Pain (FUV for bushra RFA 6-28-24 for bushra low back pain. Patient states pain in R low back is \"good\". No pain in R side. L low back is still painful. Pain score is 7/10 in L low back. She will get \"severe stabbing\" pain in L low back when she is getting up from a sitting position or going into a sitting position. Pain otherwise is described as a sharp pain. Walking and/or standing long periods of time will increase her pain.). Nothing helps decrease the pain. Patient is ambulating with a cane. ELIZABETH = 68/100. SOAPP = 0.    Tonya Osman RN 07/30/24 8:59 AM     Patient is a 63-year-old female with a past medical history significant for sacroiliitis, lumbar spondylosis, lumbar stenosis, lumbar degenerative disc disease, lumbar disc bulge, lumbar neuritis and chronic back pain.   She presents today for follow-up after undergoing a repeat bilateral medial branch RFA covering the L4-S1 facet joints.  This was done on 6/28/2024.  It has given her 100% relief on the right side but not as much relief on the left side as she is still having left-sided buttock pain.  It is a stabbing type discomfort.  She rates it a 7/10.  Worse with certain activities.  Worse with standing, worse with walking, worse with being upright, worse with being active.  She has a history of right-sided sacroiliac joint injection.  This was done on 4/26/2024 and has given her 75% relief.  Prior to that she underwent L5-S1 epidural steroid injection.  This was done on 3/1/2024 and has given her 100% relief.  Unfortunate, she is having buttock pain at this time that she states is her rate limiting factor.  She is done therapy in the past that has not helped.  She has trialed a multitude of anti-inflammatory medications that have not helped.  This affects her ability to walk, stand, bend, twist, turn, and be upright and active.        Review of Systems   Constitutional: Negative. "    HENT: Negative.     Eyes: Negative.    Respiratory: Negative.     Cardiovascular: Negative.    Gastrointestinal: Negative.    Endocrine: Negative.    Genitourinary: Negative.    Musculoskeletal:  Positive for arthralgias, back pain, gait problem and myalgias.   Skin: Negative.    Allergic/Immunologic: Negative.    Neurological:  Positive for weakness and numbness.   Hematological: Negative.    Psychiatric/Behavioral: Negative.         Objective   Physical Exam  Vitals and nursing note reviewed.   Constitutional:       General: She is not in acute distress.     Appearance: Normal appearance. She is not ill-appearing.   HENT:      Head: Normocephalic and atraumatic.      Right Ear: External ear normal.      Left Ear: External ear normal.      Nose: Nose normal.      Mouth/Throat:      Pharynx: Oropharynx is clear.   Eyes:      Conjunctiva/sclera: Conjunctivae normal.   Cardiovascular:      Rate and Rhythm: Normal rate and regular rhythm.      Pulses: Normal pulses.   Pulmonary:      Effort: Pulmonary effort is normal.      Breath sounds: Normal breath sounds.   Musculoskeletal:         General: Normal range of motion.      Cervical back: Normal range of motion.      Comments: Pain with compression of the left-sided sacroiliac joint  Positive PEPE test on the left  Positive thigh thrust on the left  Positive Gaenslen's test on the left   Skin:     General: Skin is warm and dry.   Neurological:      General: No focal deficit present.      Mental Status: She is alert and oriented to person, place, and time. Mental status is at baseline.   Psychiatric:         Mood and Affect: Mood normal.         Behavior: Behavior normal.         Thought Content: Thought content normal.         Judgment: Judgment normal.       XR lumbar spine 4+ views w flexion extension  Order: [id number]  Impression      1. Stable L3 superior endplate compression fracture.  2. Multilevel spinal degenerative changes with a slight degenerative  retrolisthesis L3 on L4.  3. No evidence of dynamic instability.  JAR/[employer]          Workstation ID:   [id number]  Narrative    EXAMINATION:  XR LUMBAR SPINE STANDARD WITH FLEX/EXT 4+ VIEWS  1/31/2024 11:15 am    HISTORY:  ORDERING SYSTEM PROVIDED HISTORY:  Back pain,     TECHNOLOGIST PROVIDED HISTORY:   Illness/Other  Reason for exam: low back pain  Cancer History: u  Surgery, RadiationHistory: u  Encounter Type: Initial  Additional signs and symptoms: .    ORDERING SYSTEM PROVIDED DIAGNOSIS CODES:  M54.50 Low back pain, unspecified back pain laterality, unspecified chronicity, unspecified whether sciatica present      COMPARISON:  No prior lumbar spine series.  Correlation made with MR lumbar spine October 9, 2023.    FINDINGS:  Five views of the lumbar spine were obtained including lateral flexion and extension.    There is a superior endplate compression fracture of the L3 vertebrae which appears to be grossly stable from the prior MRI.  The remaining lumbar vertebrae are maintained in height.  Multilevel spinal degenerative changes are present.  There is a slight retrolisthesis L3 on L4.  No evidence of dynamic instability with lateral flexion and extension.    Assessment/Plan   Diagnoses and all orders for this visit:  Chronic SI joint pain  -     Sacroiliac Joint Injection; Future  -     FL pain management; Future  Chronic bilateral low back pain, unspecified whether sciatica present  Polyarthralgia  Chronic radicular low back pain  Spinal stenosis, lumbar region with neurogenic claudication  Other orders  -     NPO Diet Except: Sips with meds; Effective now; Standing  -     Height and weight; Standing  -     Insert and maintain peripheral IV; Standing  -     Saline lock IV; Standing  -     Type And Screen; Standing  -     Inpatient consult to Respiratory Care; Standing  -     lactated Ringer's infusion  -     Adult diet Regular; Standing  -     Vital Signs; Standing  -     Notify physician - Standard Parameters;  Standing  -     Continue IV fluids ordered pre-procedure; Standing  -     Prior to Discharge O2 Weaning; Standing  -     Pulse oximetry, continuous; Standing  -     Discharge patient; Standing  -     lidocaine PF (Xylocaine) 20 mg/mL (2 %) injection 120 mg  -     iohexol (OMNIPaque) 300 mg iodine/mL solution 3 mL  -     bupivacaine PF (Marcaine) 0.5 % (5 mg/mL) injection 10 mg  -     methylPREDNISolone acetate (DEPO-Medrol) injection 40 mg       Patient is a 63-year-old female with the above-mentioned medical diagnoses.  She is presenting today after undergoing bilateral medial branch RFA covering the L4-S1 facet joints.  This was done on 6/28/2024 and has given her significant relief.  100% on the right but not as much relief on the left but at this time, she appears to be having some pain related to her sacroiliac joint.  She has buttock pain that is worse with being upright and active.  Worse with certain activities.  Therapy in the past and other conservative treatments that she has pursued have not helped.  We had a long discussion about the RFA process.  At this time, the left-sided buttock pain appears to be more related to her sacroiliac joint.  I recommended to patient a left-sided sacroiliac joint injection under fluoroscopy for both diagnostic and therapeutic purposes.  Procedure was discussed.  Risk and benefits were discussed.  Patient is agreeable.  She will follow-up 3 weeks after the injection for reevaluation.  Call the clinic in the interim should she require anything from our services.

## 2024-07-30 NOTE — PROGRESS NOTES
"Subjective   Patient ID: Nusrat Irizarry is a 63 y.o. female who presents for Pain (FUV for bushra RFA 6-28-24 for bushra low back pain. Patient states pain in R low back is \"good\". No pain in R side. L low back is still painful. Pain score is 7/10 in L low back. She will get \"severe stabbing\" pain in L low back when she is getting up from a sitting position or going into a sitting position. Pain otherwise is described as a sharp pain. Walking and/or standing long periods of time will increase her pain.). Nothing helps decrease the pain. Patient is ambulating with a cane. ELIZABETH = 68/100. SOAPP = 0.    Tonya Osman RN 07/30/24 8:59 AM     Patient is a 63-year-old female with a past medical history significant for sacroiliitis, lumbar spondylosis, lumbar stenosis, lumbar degenerative disc disease, lumbar disc bulge, lumbar neuritis and chronic back pain.   She presents today for follow-up after undergoing a repeat bilateral medial branch RFA covering the L4-S1 facet joints.  This was done on 6/28/2024.  It has given her 100% relief on the right side but not as much relief on the left side as she is still having left-sided buttock pain.  It is a stabbing type discomfort.  She rates it a 7/10.  Worse with certain activities.  Worse with standing, worse with walking, worse with being upright, worse with being active.  She has a history of right-sided sacroiliac joint injection.  This was done on 4/26/2024 and has given her 75% relief.  Prior to that she underwent L5-S1 epidural steroid injection.  This was done on 3/1/2024 and has given her 100% relief.  Unfortunate, she is having buttock pain at this time that she states is her rate limiting factor.  She is done therapy in the past that has not helped.  She has trialed a multitude of anti-inflammatory medications that have not helped.  This affects her ability to walk, stand, bend, twist, turn, and be upright and active.        Review of Systems   Constitutional: Negative. "    HENT: Negative.     Eyes: Negative.    Respiratory: Negative.     Cardiovascular: Negative.    Gastrointestinal: Negative.    Endocrine: Negative.    Genitourinary: Negative.    Musculoskeletal:  Positive for arthralgias, back pain, gait problem and myalgias.   Skin: Negative.    Allergic/Immunologic: Negative.    Neurological:  Positive for weakness and numbness.   Hematological: Negative.    Psychiatric/Behavioral: Negative.         Objective   Physical Exam  Vitals and nursing note reviewed.   Constitutional:       General: She is not in acute distress.     Appearance: Normal appearance. She is not ill-appearing.   HENT:      Head: Normocephalic and atraumatic.      Right Ear: External ear normal.      Left Ear: External ear normal.      Nose: Nose normal.      Mouth/Throat:      Pharynx: Oropharynx is clear.   Eyes:      Conjunctiva/sclera: Conjunctivae normal.   Cardiovascular:      Rate and Rhythm: Normal rate and regular rhythm.      Pulses: Normal pulses.   Pulmonary:      Effort: Pulmonary effort is normal.      Breath sounds: Normal breath sounds.   Musculoskeletal:         General: Normal range of motion.      Cervical back: Normal range of motion.      Comments: Pain with compression of the left-sided sacroiliac joint  Positive PEPE test on the left  Positive thigh thrust on the left  Positive Gaenslen's test on the left   Skin:     General: Skin is warm and dry.   Neurological:      General: No focal deficit present.      Mental Status: She is alert and oriented to person, place, and time. Mental status is at baseline.   Psychiatric:         Mood and Affect: Mood normal.         Behavior: Behavior normal.         Thought Content: Thought content normal.         Judgment: Judgment normal.       XR lumbar spine 4+ views w flexion extension  Order: [id number]  Impression      1. Stable L3 superior endplate compression fracture.  2. Multilevel spinal degenerative changes with a slight degenerative  retrolisthesis L3 on L4.  3. No evidence of dynamic instability.  JAR/[employer]          Workstation ID:   [id number]  Narrative    EXAMINATION:  XR LUMBAR SPINE STANDARD WITH FLEX/EXT 4+ VIEWS  1/31/2024 11:15 am    HISTORY:  ORDERING SYSTEM PROVIDED HISTORY:  Back pain,     TECHNOLOGIST PROVIDED HISTORY:   Illness/Other  Reason for exam: low back pain  Cancer History: u  Surgery, RadiationHistory: u  Encounter Type: Initial  Additional signs and symptoms: .    ORDERING SYSTEM PROVIDED DIAGNOSIS CODES:  M54.50 Low back pain, unspecified back pain laterality, unspecified chronicity, unspecified whether sciatica present      COMPARISON:  No prior lumbar spine series.  Correlation made with MR lumbar spine October 9, 2023.    FINDINGS:  Five views of the lumbar spine were obtained including lateral flexion and extension.    There is a superior endplate compression fracture of the L3 vertebrae which appears to be grossly stable from the prior MRI.  The remaining lumbar vertebrae are maintained in height.  Multilevel spinal degenerative changes are present.  There is a slight retrolisthesis L3 on L4.  No evidence of dynamic instability with lateral flexion and extension.    Assessment/Plan   Diagnoses and all orders for this visit:  Chronic SI joint pain  -     Sacroiliac Joint Injection; Future  -     FL pain management; Future  Chronic bilateral low back pain, unspecified whether sciatica present  Polyarthralgia  Chronic radicular low back pain  Spinal stenosis, lumbar region with neurogenic claudication  Other orders  -     NPO Diet Except: Sips with meds; Effective now; Standing  -     Height and weight; Standing  -     Insert and maintain peripheral IV; Standing  -     Saline lock IV; Standing  -     Type And Screen; Standing  -     Inpatient consult to Respiratory Care; Standing  -     lactated Ringer's infusion  -     Adult diet Regular; Standing  -     Vital Signs; Standing  -     Notify physician - Standard Parameters;  Standing  -     Continue IV fluids ordered pre-procedure; Standing  -     Prior to Discharge O2 Weaning; Standing  -     Pulse oximetry, continuous; Standing  -     Discharge patient; Standing  -     lidocaine PF (Xylocaine) 20 mg/mL (2 %) injection 120 mg  -     iohexol (OMNIPaque) 300 mg iodine/mL solution 3 mL  -     bupivacaine PF (Marcaine) 0.5 % (5 mg/mL) injection 10 mg  -     methylPREDNISolone acetate (DEPO-Medrol) injection 40 mg       Patient is a 63-year-old female with the above-mentioned medical diagnoses.  She is presenting today after undergoing bilateral medial branch RFA covering the L4-S1 facet joints.  This was done on 6/28/2024 and has given her significant relief.  100% on the right but not as much relief on the left but at this time, she appears to be having some pain related to her sacroiliac joint.  She has buttock pain that is worse with being upright and active.  Worse with certain activities.  Therapy in the past and other conservative treatments that she has pursued have not helped.  We had a long discussion about the RFA process.  At this time, the left-sided buttock pain appears to be more related to her sacroiliac joint.  I recommended to patient a left-sided sacroiliac joint injection under fluoroscopy for both diagnostic and therapeutic purposes.  Procedure was discussed.  Risk and benefits were discussed.  Patient is agreeable.  She will follow-up 3 weeks after the injection for reevaluation.  Call the clinic in the interim should she require anything from our services.

## 2024-07-31 PROCEDURE — RXMED WILLOW AMBULATORY MEDICATION CHARGE

## 2024-08-01 ENCOUNTER — PHARMACY VISIT (OUTPATIENT)
Dept: PHARMACY | Facility: CLINIC | Age: 64
End: 2024-08-01
Payer: MEDICARE

## 2024-08-02 ENCOUNTER — TELEPHONE (OUTPATIENT)
Dept: PAIN MEDICINE | Facility: CLINIC | Age: 64
End: 2024-08-02
Payer: MEDICARE

## 2024-08-16 ENCOUNTER — HOSPITAL ENCOUNTER (OUTPATIENT)
Dept: OPERATING ROOM | Facility: HOSPITAL | Age: 64
Setting detail: OUTPATIENT SURGERY
Discharge: HOME | End: 2024-08-16
Payer: MEDICARE

## 2024-08-16 VITALS
DIASTOLIC BLOOD PRESSURE: 69 MMHG | HEIGHT: 69 IN | BODY MASS INDEX: 33.7 KG/M2 | TEMPERATURE: 98.4 F | WEIGHT: 227.51 LBS | SYSTOLIC BLOOD PRESSURE: 112 MMHG | RESPIRATION RATE: 20 BRPM | HEART RATE: 73 BPM | OXYGEN SATURATION: 92 %

## 2024-08-16 DIAGNOSIS — M53.3 CHRONIC SI JOINT PAIN: ICD-10-CM

## 2024-08-16 DIAGNOSIS — G89.29 CHRONIC SI JOINT PAIN: ICD-10-CM

## 2024-08-16 PROCEDURE — 2550000001 HC RX 255 CONTRASTS: Performed by: PHYSICIAN ASSISTANT

## 2024-08-16 PROCEDURE — 2500000005 HC RX 250 GENERAL PHARMACY W/O HCPCS: Performed by: PHYSICIAN ASSISTANT

## 2024-08-16 PROCEDURE — 96372 THER/PROPH/DIAG INJ SC/IM: CPT | Performed by: STUDENT IN AN ORGANIZED HEALTH CARE EDUCATION/TRAINING PROGRAM

## 2024-08-16 PROCEDURE — 27096 INJECT SACROILIAC JOINT: CPT | Mod: LT | Performed by: STUDENT IN AN ORGANIZED HEALTH CARE EDUCATION/TRAINING PROGRAM

## 2024-08-16 PROCEDURE — 2500000004 HC RX 250 GENERAL PHARMACY W/ HCPCS (ALT 636 FOR OP/ED): Performed by: STUDENT IN AN ORGANIZED HEALTH CARE EDUCATION/TRAINING PROGRAM

## 2024-08-16 RX ORDER — BUPIVACAINE HYDROCHLORIDE 5 MG/ML
2 INJECTION, SOLUTION EPIDURAL; INTRACAUDAL ONCE
Status: DISCONTINUED | OUTPATIENT
Start: 2024-08-16 | End: 2024-08-17 | Stop reason: HOSPADM

## 2024-08-16 RX ORDER — LIDOCAINE HYDROCHLORIDE 20 MG/ML
6 INJECTION, SOLUTION EPIDURAL; INFILTRATION; INTRACAUDAL; PERINEURAL ONCE
Status: COMPLETED | OUTPATIENT
Start: 2024-08-16 | End: 2024-08-16

## 2024-08-16 RX ORDER — BUPIVACAINE HYDROCHLORIDE 5 MG/ML
INJECTION, SOLUTION EPIDURAL; INTRACAUDAL AS NEEDED
Status: COMPLETED | OUTPATIENT
Start: 2024-08-16 | End: 2024-08-16

## 2024-08-16 RX ORDER — METHYLPREDNISOLONE ACETATE 40 MG/ML
40 INJECTION, SUSPENSION INTRA-ARTICULAR; INTRALESIONAL; INTRAMUSCULAR; SOFT TISSUE ONCE
Status: DISCONTINUED | OUTPATIENT
Start: 2024-08-16 | End: 2024-08-17 | Stop reason: HOSPADM

## 2024-08-16 RX ORDER — METHYLPREDNISOLONE ACETATE 40 MG/ML
INJECTION, SUSPENSION INTRA-ARTICULAR; INTRALESIONAL; INTRAMUSCULAR; SOFT TISSUE AS NEEDED
Status: COMPLETED | OUTPATIENT
Start: 2024-08-16 | End: 2024-08-16

## 2024-08-16 ASSESSMENT — PAIN - FUNCTIONAL ASSESSMENT
PAIN_FUNCTIONAL_ASSESSMENT: 0-10
PAIN_FUNCTIONAL_ASSESSMENT: 0-10

## 2024-08-16 ASSESSMENT — ENCOUNTER SYMPTOMS
OCCASIONAL FEELINGS OF UNSTEADINESS: 1
LOSS OF SENSATION IN FEET: 1
DEPRESSION: 0

## 2024-08-16 ASSESSMENT — PAIN SCALES - GENERAL
PAINLEVEL_OUTOF10: 0 - NO PAIN
PAINLEVEL_OUTOF10: 4

## 2024-08-16 NOTE — PERIOPERATIVE NURSING NOTE
Patient sitting on side of bed.  Discharge instructions reviewed verbally with patient and a written copy was provided to patient.  Vital signs obtained.

## 2024-08-16 NOTE — Clinical Note
Patient transported to post recovery care and report given to post recovery rn. Side rails up and bed wheels locked.

## 2024-08-19 PROCEDURE — RXMED WILLOW AMBULATORY MEDICATION CHARGE

## 2024-08-21 ENCOUNTER — PHARMACY VISIT (OUTPATIENT)
Dept: PHARMACY | Facility: CLINIC | Age: 64
End: 2024-08-21
Payer: MEDICARE

## 2024-08-21 PROCEDURE — RXMED WILLOW AMBULATORY MEDICATION CHARGE

## 2024-09-03 PROCEDURE — RXMED WILLOW AMBULATORY MEDICATION CHARGE

## 2024-09-06 ENCOUNTER — PHARMACY VISIT (OUTPATIENT)
Dept: PHARMACY | Facility: CLINIC | Age: 64
End: 2024-09-06

## 2024-09-06 ENCOUNTER — LAB (OUTPATIENT)
Dept: LAB | Facility: LAB | Age: 64
End: 2024-09-06
Payer: MEDICARE

## 2024-09-06 DIAGNOSIS — E03.4 HYPOTHYROIDISM DUE TO ACQUIRED ATROPHY OF THYROID: ICD-10-CM

## 2024-09-06 DIAGNOSIS — E78.2 HYPERCHOLESTEROLEMIA WITH HYPERTRIGLYCERIDEMIA: ICD-10-CM

## 2024-09-06 DIAGNOSIS — E55.9 VITAMIN D DEFICIENCY: ICD-10-CM

## 2024-09-06 DIAGNOSIS — E53.9 VITAMIN B DEFICIENCY: ICD-10-CM

## 2024-09-06 DIAGNOSIS — R73.02 GLUCOSE INTOLERANCE (IMPAIRED GLUCOSE TOLERANCE): ICD-10-CM

## 2024-09-06 LAB
25(OH)D3 SERPL-MCNC: 61 NG/ML (ref 30–100)
ALBUMIN SERPL BCP-MCNC: 4.2 G/DL (ref 3.4–5)
ALP SERPL-CCNC: 76 U/L (ref 33–136)
ALT SERPL W P-5'-P-CCNC: <3 U/L (ref 7–45)
ANION GAP SERPL CALC-SCNC: 12 MMOL/L (ref 10–20)
AST SERPL W P-5'-P-CCNC: 11 U/L (ref 9–39)
BASOPHILS # BLD AUTO: 0.01 X10*3/UL (ref 0–0.1)
BASOPHILS NFR BLD AUTO: 0.2 %
BILIRUB SERPL-MCNC: 0.6 MG/DL (ref 0–1.2)
BUN SERPL-MCNC: 21 MG/DL (ref 6–23)
CALCIUM SERPL-MCNC: 8.7 MG/DL (ref 8.6–10.3)
CHLORIDE SERPL-SCNC: 106 MMOL/L (ref 98–107)
CHOLEST SERPL-MCNC: 127 MG/DL (ref 0–199)
CHOLESTEROL/HDL RATIO: 3.1
CO2 SERPL-SCNC: 26 MMOL/L (ref 21–32)
CREAT SERPL-MCNC: 1.07 MG/DL (ref 0.5–1.05)
EGFRCR SERPLBLD CKD-EPI 2021: 57 ML/MIN/1.73M*2
EOSINOPHIL # BLD AUTO: 0.19 X10*3/UL (ref 0–0.7)
EOSINOPHIL NFR BLD AUTO: 3.7 %
ERYTHROCYTE [DISTWIDTH] IN BLOOD BY AUTOMATED COUNT: 12.8 % (ref 11.5–14.5)
EST. AVERAGE GLUCOSE BLD GHB EST-MCNC: 103 MG/DL
GLUCOSE SERPL-MCNC: 91 MG/DL (ref 74–99)
HBA1C MFR BLD: 5.2 %
HCT VFR BLD AUTO: 38 % (ref 36–46)
HDLC SERPL-MCNC: 41 MG/DL
HGB BLD-MCNC: 12.1 G/DL (ref 12–16)
IMM GRANULOCYTES # BLD AUTO: 0.01 X10*3/UL (ref 0–0.7)
IMM GRANULOCYTES NFR BLD AUTO: 0.2 % (ref 0–0.9)
LDLC SERPL CALC-MCNC: 67 MG/DL
LYMPHOCYTES # BLD AUTO: 1.32 X10*3/UL (ref 1.2–4.8)
LYMPHOCYTES NFR BLD AUTO: 25.6 %
MAGNESIUM SERPL-MCNC: 2.13 MG/DL (ref 1.6–2.4)
MCH RBC QN AUTO: 29.5 PG (ref 26–34)
MCHC RBC AUTO-ENTMCNC: 31.8 G/DL (ref 32–36)
MCV RBC AUTO: 93 FL (ref 80–100)
MONOCYTES # BLD AUTO: 0.5 X10*3/UL (ref 0.1–1)
MONOCYTES NFR BLD AUTO: 9.7 %
NEUTROPHILS # BLD AUTO: 3.13 X10*3/UL (ref 1.2–7.7)
NEUTROPHILS NFR BLD AUTO: 60.6 %
NON HDL CHOLESTEROL: 86 MG/DL (ref 0–149)
NRBC BLD-RTO: 0 /100 WBCS (ref 0–0)
PLATELET # BLD AUTO: 228 X10*3/UL (ref 150–450)
POTASSIUM SERPL-SCNC: 4.1 MMOL/L (ref 3.5–5.3)
PROT SERPL-MCNC: 6.6 G/DL (ref 6.4–8.2)
RBC # BLD AUTO: 4.1 X10*6/UL (ref 4–5.2)
SODIUM SERPL-SCNC: 140 MMOL/L (ref 136–145)
TRIGL SERPL-MCNC: 97 MG/DL (ref 0–149)
TSH SERPL-ACNC: 3.96 MIU/L (ref 0.44–3.98)
VIT B12 SERPL-MCNC: 402 PG/ML (ref 211–911)
VLDL: 19 MG/DL (ref 0–40)
WBC # BLD AUTO: 5.2 X10*3/UL (ref 4.4–11.3)

## 2024-09-06 PROCEDURE — 36415 COLL VENOUS BLD VENIPUNCTURE: CPT

## 2024-09-06 PROCEDURE — 84443 ASSAY THYROID STIM HORMONE: CPT

## 2024-09-06 PROCEDURE — 80053 COMPREHEN METABOLIC PANEL: CPT

## 2024-09-06 PROCEDURE — 82607 VITAMIN B-12: CPT

## 2024-09-06 PROCEDURE — 82306 VITAMIN D 25 HYDROXY: CPT

## 2024-09-06 PROCEDURE — 80061 LIPID PANEL: CPT

## 2024-09-06 PROCEDURE — 83036 HEMOGLOBIN GLYCOSYLATED A1C: CPT

## 2024-09-06 PROCEDURE — 83735 ASSAY OF MAGNESIUM: CPT

## 2024-09-06 PROCEDURE — 85025 COMPLETE CBC W/AUTO DIFF WBC: CPT

## 2024-09-06 PROCEDURE — RXMED WILLOW AMBULATORY MEDICATION CHARGE

## 2024-09-09 ENCOUNTER — OFFICE VISIT (OUTPATIENT)
Dept: PAIN MEDICINE | Facility: CLINIC | Age: 64
End: 2024-09-09
Payer: MEDICARE

## 2024-09-09 ENCOUNTER — PHARMACY VISIT (OUTPATIENT)
Dept: PHARMACY | Facility: CLINIC | Age: 64
End: 2024-09-09
Payer: MEDICARE

## 2024-09-09 VITALS
RESPIRATION RATE: 16 BRPM | SYSTOLIC BLOOD PRESSURE: 93 MMHG | WEIGHT: 224 LBS | DIASTOLIC BLOOD PRESSURE: 61 MMHG | HEART RATE: 83 BPM | BODY MASS INDEX: 33.08 KG/M2

## 2024-09-09 DIAGNOSIS — M48.062 SPINAL STENOSIS, LUMBAR REGION WITH NEUROGENIC CLAUDICATION: ICD-10-CM

## 2024-09-09 DIAGNOSIS — G58.8 CLUNEAL NEUROPATHY: Primary | ICD-10-CM

## 2024-09-09 DIAGNOSIS — M51.36 DISC DEGENERATION, LUMBAR: ICD-10-CM

## 2024-09-09 DIAGNOSIS — M53.3 CHRONIC SI JOINT PAIN: ICD-10-CM

## 2024-09-09 DIAGNOSIS — G89.29 CHRONIC SI JOINT PAIN: ICD-10-CM

## 2024-09-09 PROCEDURE — 99213 OFFICE O/P EST LOW 20 MIN: CPT

## 2024-09-09 RX ORDER — LIDOCAINE HYDROCHLORIDE 20 MG/ML
10 INJECTION, SOLUTION EPIDURAL; INFILTRATION; INTRACAUDAL; PERINEURAL ONCE
OUTPATIENT
Start: 2024-09-09 | End: 2024-09-09

## 2024-09-09 RX ORDER — SODIUM CHLORIDE, SODIUM LACTATE, POTASSIUM CHLORIDE, CALCIUM CHLORIDE 600; 310; 30; 20 MG/100ML; MG/100ML; MG/100ML; MG/100ML
20 INJECTION, SOLUTION INTRAVENOUS CONTINUOUS
OUTPATIENT
Start: 2024-09-09

## 2024-09-09 RX ORDER — BUPIVACAINE HYDROCHLORIDE 5 MG/ML
3 INJECTION, SOLUTION EPIDURAL; INTRACAUDAL ONCE
OUTPATIENT
Start: 2024-09-09 | End: 2024-09-09

## 2024-09-09 ASSESSMENT — ENCOUNTER SYMPTOMS
EYES NEGATIVE: 1
ENDOCRINE NEGATIVE: 1
FACIAL ASYMMETRY: 0
DYSPHORIC MOOD: 0
WEAKNESS: 0
ALLERGIC/IMMUNOLOGIC NEGATIVE: 1
CONSTITUTIONAL NEGATIVE: 1
BRUISES/BLEEDS EASILY: 0
SHORTNESS OF BREATH: 0
COUGH: 0
BACK PAIN: 1
LIGHT-HEADEDNESS: 0
MYALGIAS: 1
PALPITATIONS: 0
WHEEZING: 0
ARTHRALGIAS: 0
ADENOPATHY: 0

## 2024-09-09 NOTE — PROGRESS NOTES
Subjective   Patient ID: Nusrat Irizarry is a 64 y.o. female who presents for Follow-up (FUV Lt SIJ on 8/16/24 reports a lot of relief with the pain. Today she reports pain with standing and laying on 1 side for too long rates 5/10 is describes as a pressure. She is taking Tylenol and Ibuprofen when the pain is bad stretching, relieved with repositioning and sitting. ) ELIZABETH score 58%.   Mel Reddy RN 09/09/24 10:28 AM     Patient is a 64-year-old female with a past medical history significant for sacroiliitis, lumbar spondylosis, lumbar stenosis, lumbar degenerative disc disease, lumbar disc bulge, lumbar neuritis and chronic back pain who presents today for follow-up after undergoing a left-sided sacroiliac joint injection on 8/16/2024.  The patient reports significant relief from this procedure with around 80% reduction in symptoms, especially the radicular pain she was having.  She reports the pain from the SI joint is much improved and the relief is still ongoing today.  However, her biggest complaint about pain is in the mid to the lower back above her belt line on either side, described as a pressure/pain worse with standing and activity, improved with sitting.  We do not prescribe any medication to her.        Review of Systems   Constitutional: Negative.    HENT: Negative.     Eyes: Negative.    Respiratory:  Negative for cough, shortness of breath and wheezing.    Cardiovascular:  Negative for chest pain, palpitations and leg swelling.   Endocrine: Negative.    Genitourinary: Negative.    Musculoskeletal:  Positive for back pain and myalgias. Negative for arthralgias.   Skin: Negative.    Allergic/Immunologic: Negative.    Neurological:  Negative for facial asymmetry, weakness and light-headedness.   Hematological:  Negative for adenopathy. Does not bruise/bleed easily.   Psychiatric/Behavioral:  Negative for dysphoric mood and suicidal ideas.        Objective   Physical Exam  Constitutional:        General: She is not in acute distress.     Appearance: Normal appearance.   HENT:      Head: Normocephalic.      Mouth/Throat:      Mouth: Mucous membranes are moist.   Eyes:      Extraocular Movements: Extraocular movements intact.   Cardiovascular:      Rate and Rhythm: Normal rate and regular rhythm.      Pulses: Normal pulses.      Heart sounds: Normal heart sounds. No murmur heard.     No friction rub. No gallop.   Pulmonary:      Effort: Pulmonary effort is normal.      Breath sounds: Normal breath sounds. No wheezing, rhonchi or rales.   Abdominal:      General: Abdomen is flat.      Palpations: Abdomen is soft.   Musculoskeletal:      Cervical back: Normal range of motion.      Right lower leg: No edema.      Left lower leg: No edema.      Comments: Strength 5/5 BLE  Ambulates without assistance  Tenderness to palpation across bilateral lower back superior to the iliac crest   Lymphadenopathy:      Cervical: No cervical adenopathy.   Skin:     General: Skin is warm and dry.   Neurological:      General: No focal deficit present.      Mental Status: She is alert and oriented to person, place, and time. Mental status is at baseline.   Psychiatric:         Mood and Affect: Mood normal.         Behavior: Behavior normal.         Assessment/Plan   Diagnoses and all orders for this visit:  Cluneal neuropathy  -     FL pain management; Future  -      Medial Nerve Branch Block; Future  Disc degeneration, lumbar  Spinal stenosis, lumbar region with neurogenic claudication  Chronic SI joint pain  Other orders  -     NPO Diet Except: Sips with meds; Effective now; Standing  -     Height and weight; Standing  -     Type And Screen; Standing  -     Inpatient consult to Respiratory Care; Standing  -     lactated Ringer's infusion  -     Adult diet Regular; Standing  -     Vital Signs; Standing  -     Notify physician - Standard Parameters; Standing  -     Continue IV fluids ordered pre-procedure; Standing  -     Prior to  Discharge O2 Weaning; Standing  -     Pulse oximetry, continuous; Standing  -     Discharge patient; Standing  -     Vital Signs; Standing  -     Vital Signs; Standing  -     Height and weight; Standing  -     POCT glucose meter docked device; Standing  -     FL less than 1 hour; Standing  -     iohexol (OMNIPaque) 300 mg iodine/mL solution 3 mL  -     lidocaine PF (Xylocaine) 20 mg/mL (2 %) injection 200 mg  -     bupivacaine PF (Marcaine) 0.5 % (5 mg/mL) injection 15 mg       Regarding her ongoing lower back pain, we reviewed her MRI lumbar spine and discussed options.  She mentioned pursuing chiropractic care to see if this would help with the pain, but is interested in further injections to help with this pain, as that is very bothersome and interferes with her ability to ambulate and do the things that she wants to do.  We discussed an epidural steroid injection, or given the lack of radicular symptoms she has at this time, we discussed trialing a bilateral superior cluneal nerve block under fluoroscopy.  The procedure was discussed, risks and benefits were discussed, patient is agreeable.  She will need to hold her aspirin for 6 days and vitamin supplements for 1 week prior to the injection.  She will follow-up after the injection for reevaluation, call clinic sooner if necessary.  If she gets good relief from the nerve block, she would be a good candidate for a radiofrequency ablation.

## 2024-09-10 ENCOUNTER — TELEPHONE (OUTPATIENT)
Dept: PAIN MEDICINE | Facility: CLINIC | Age: 64
End: 2024-09-10
Payer: MEDICARE

## 2024-09-10 NOTE — TELEPHONE ENCOUNTER
BILATERAL SUPERIOR CLUNEAL NB CPT 66178 DX G58.8  NO AUTH REQUIRED SCANNED TO CHART  NEEDS ORDER CORRECTED

## 2024-09-11 ENCOUNTER — TELEPHONE (OUTPATIENT)
Dept: PAIN MEDICINE | Facility: CLINIC | Age: 64
End: 2024-09-11
Payer: MEDICARE

## 2024-09-11 NOTE — TELEPHONE ENCOUNTER
Patient is scheduled for a Bilateral Superior Cluneal Nerve Block.  Is it acceptable for patient to hold Aspirin for 6 days prior to injection and 24 hours after?  Please advise.  Thanks!

## 2024-09-12 ENCOUNTER — APPOINTMENT (OUTPATIENT)
Dept: PRIMARY CARE | Facility: CLINIC | Age: 64
End: 2024-09-12
Payer: MEDICARE

## 2024-09-18 ENCOUNTER — APPOINTMENT (OUTPATIENT)
Dept: PRIMARY CARE | Facility: CLINIC | Age: 64
End: 2024-09-18
Payer: MEDICARE

## 2024-09-18 VITALS
HEART RATE: 81 BPM | HEIGHT: 69 IN | SYSTOLIC BLOOD PRESSURE: 110 MMHG | DIASTOLIC BLOOD PRESSURE: 73 MMHG | BODY MASS INDEX: 33.62 KG/M2 | WEIGHT: 227 LBS

## 2024-09-18 DIAGNOSIS — N18.30 CHRONIC RENAL INSUFFICIENCY, STAGE 3 (MODERATE) (MULTI): ICD-10-CM

## 2024-09-18 DIAGNOSIS — E55.9 VITAMIN D DEFICIENCY: ICD-10-CM

## 2024-09-18 DIAGNOSIS — Z23 NEED FOR INFLUENZA VACCINATION: ICD-10-CM

## 2024-09-18 DIAGNOSIS — Z85.3 PERSONAL HISTORY OF BREAST CANCER: ICD-10-CM

## 2024-09-18 DIAGNOSIS — Z71.89 ADVANCED CARE PLANNING/COUNSELING DISCUSSION: ICD-10-CM

## 2024-09-18 DIAGNOSIS — G20.B1 PARKINSON'S DISEASE WITH DYSKINESIA, UNSPECIFIED WHETHER MANIFESTATIONS FLUCTUATE: ICD-10-CM

## 2024-09-18 DIAGNOSIS — E53.9 VITAMIN B DEFICIENCY: ICD-10-CM

## 2024-09-18 DIAGNOSIS — I10 BENIGN ESSENTIAL HYPERTENSION: ICD-10-CM

## 2024-09-18 DIAGNOSIS — Z00.00 ROUTINE GENERAL MEDICAL EXAMINATION AT HEALTH CARE FACILITY: ICD-10-CM

## 2024-09-18 DIAGNOSIS — R91.1 NODULE OF LEFT LUNG: ICD-10-CM

## 2024-09-18 DIAGNOSIS — E66.09 CLASS 1 OBESITY DUE TO EXCESS CALORIES WITH SERIOUS COMORBIDITY AND BODY MASS INDEX (BMI) OF 33.0 TO 33.9 IN ADULT: ICD-10-CM

## 2024-09-18 DIAGNOSIS — R53.83 MALAISE AND FATIGUE: ICD-10-CM

## 2024-09-18 DIAGNOSIS — R53.81 MALAISE AND FATIGUE: ICD-10-CM

## 2024-09-18 DIAGNOSIS — E03.4 HYPOTHYROIDISM DUE TO ACQUIRED ATROPHY OF THYROID: ICD-10-CM

## 2024-09-18 DIAGNOSIS — I20.89 STABLE ANGINA (CMS-HCC): ICD-10-CM

## 2024-09-18 DIAGNOSIS — Z00.00 MEDICARE ANNUAL WELLNESS VISIT, SUBSEQUENT: Primary | ICD-10-CM

## 2024-09-18 PROCEDURE — G0008 ADMIN INFLUENZA VIRUS VAC: HCPCS | Performed by: PHYSICIAN ASSISTANT

## 2024-09-18 PROCEDURE — G0447 BEHAVIOR COUNSEL OBESITY 15M: HCPCS | Performed by: PHYSICIAN ASSISTANT

## 2024-09-18 PROCEDURE — 1160F RVW MEDS BY RX/DR IN RCRD: CPT | Performed by: PHYSICIAN ASSISTANT

## 2024-09-18 PROCEDURE — 99214 OFFICE O/P EST MOD 30 MIN: CPT | Performed by: PHYSICIAN ASSISTANT

## 2024-09-18 PROCEDURE — 3078F DIAST BP <80 MM HG: CPT | Performed by: PHYSICIAN ASSISTANT

## 2024-09-18 PROCEDURE — 1036F TOBACCO NON-USER: CPT | Performed by: PHYSICIAN ASSISTANT

## 2024-09-18 PROCEDURE — 3008F BODY MASS INDEX DOCD: CPT | Performed by: PHYSICIAN ASSISTANT

## 2024-09-18 PROCEDURE — 1170F FXNL STATUS ASSESSED: CPT | Performed by: PHYSICIAN ASSISTANT

## 2024-09-18 PROCEDURE — 90662 IIV NO PRSV INCREASED AG IM: CPT | Performed by: PHYSICIAN ASSISTANT

## 2024-09-18 PROCEDURE — G0439 PPPS, SUBSEQ VISIT: HCPCS | Performed by: PHYSICIAN ASSISTANT

## 2024-09-18 PROCEDURE — RXMED WILLOW AMBULATORY MEDICATION CHARGE

## 2024-09-18 PROCEDURE — 3074F SYST BP LT 130 MM HG: CPT | Performed by: PHYSICIAN ASSISTANT

## 2024-09-18 PROCEDURE — 1159F MED LIST DOCD IN RCRD: CPT | Performed by: PHYSICIAN ASSISTANT

## 2024-09-18 PROCEDURE — 1124F ACP DISCUSS-NO DSCNMKR DOCD: CPT | Performed by: PHYSICIAN ASSISTANT

## 2024-09-18 PROCEDURE — 99497 ADVNCD CARE PLAN 30 MIN: CPT | Performed by: PHYSICIAN ASSISTANT

## 2024-09-18 RX ORDER — LEVOTHYROXINE SODIUM 50 UG/1
TABLET ORAL
Qty: 145 TABLET | Refills: 3 | Status: SHIPPED | OUTPATIENT
Start: 2024-09-18

## 2024-09-18 RX ORDER — NITROGLYCERIN 0.4 MG/1
0.4 TABLET SUBLINGUAL EVERY 5 MIN PRN
Qty: 25 TABLET | Refills: 1 | Status: SHIPPED | OUTPATIENT
Start: 2024-09-18 | End: 2025-09-18

## 2024-09-18 ASSESSMENT — PATIENT HEALTH QUESTIONNAIRE - PHQ9
1. LITTLE INTEREST OR PLEASURE IN DOING THINGS: NOT AT ALL
2. FEELING DOWN, DEPRESSED OR HOPELESS: NOT AT ALL
SUM OF ALL RESPONSES TO PHQ9 QUESTIONS 1 AND 2: 0
2. FEELING DOWN, DEPRESSED OR HOPELESS: NOT AT ALL
SUM OF ALL RESPONSES TO PHQ9 QUESTIONS 1 AND 2: 0
1. LITTLE INTEREST OR PLEASURE IN DOING THINGS: NOT AT ALL

## 2024-09-18 ASSESSMENT — ACTIVITIES OF DAILY LIVING (ADL)
TAKING_MEDICATION: INDEPENDENT
DOING_HOUSEWORK: NEEDS ASSISTANCE
DRESSING: INDEPENDENT
GROCERY_SHOPPING: INDEPENDENT
BATHING: INDEPENDENT
MANAGING_FINANCES: INDEPENDENT

## 2024-09-18 ASSESSMENT — ENCOUNTER SYMPTOMS
CONFUSION: 0
FLANK PAIN: 0
RHINORRHEA: 0
EYE DISCHARGE: 0
COUGH: 0
SLEEP DISTURBANCE: 0
BRUISES/BLEEDS EASILY: 0
WOUND: 0
VOMITING: 0
TREMORS: 0
BACK PAIN: 0
HEADACHES: 0
FEVER: 0
ABDOMINAL PAIN: 0
SINUS PAIN: 0
FATIGUE: 1
FREQUENCY: 0
CONSTIPATION: 0
NAUSEA: 0
WHEEZING: 0
SHORTNESS OF BREATH: 0
PALPITATIONS: 0
DIARRHEA: 0
NECK PAIN: 0
DIZZINESS: 0
SORE THROAT: 0
EYE REDNESS: 0
CHEST TIGHTNESS: 0
CHILLS: 0
NUMBNESS: 0

## 2024-09-18 NOTE — ASSESSMENT & PLAN NOTE
Orders:    levothyroxine (Synthroid, Levoxyl) 50 mcg tablet; Take 1.5 tab(s) on Monday, Wednesday, and Friday and 1 tab daily on all other days    Thyroid Stimulating Hormone; Future    Thyroxine, Free; Future

## 2024-09-18 NOTE — ASSESSMENT & PLAN NOTE
Orders:    CBC and Auto Differential; Future    Comprehensive Metabolic Panel; Future    Thyroid Stimulating Hormone; Future    Thyroxine, Free; Future    Iron and TIBC; Future    Ferritin; Future    Magnesium; Future    Vitamin B12; Future    Vitamin D 25-Hydroxy,Total (for eval of Vitamin D levels); Future

## 2024-09-18 NOTE — PROGRESS NOTES
Subjective   Reason for Visit: Nusrat Irizarry is an 64 y.o. female here for a Medicare Wellness visit.     Past Medical, Surgical, and Family History reviewed and updated in chart.    Reviewed all medications by prescribing practitioner or clinical pharmacist (such as prescriptions, OTCs, herbal therapies and supplements) and documented in the medical record.    Advanced Care Planning   Diagnosis, treatment and prognosis discussed with patient.  Patient has capacity to make his/her own decision.  Patient DOES NOT have a living will.  Patient is advised to set one up and bring a copy of this documenation for the chart. >16 min spent with patient counseling      HPI  Subsequent Medicare wellness     LABS       med check   hypotension -stable   L shoulder/back pain -previously following with ortho  hypothyroid - on meds   vit D -  on meds   Hyperchol - on meds   PD - on meds   B12 - on supplement   Breast cancer - onc - transferred to    Stable angina/esophageal spasm?  Pt wakes up in the middle of the night occasion - maybe every 6 mo with chest spasm / neck throat spasm moving into her heard.  She has been on nitroglycerin in the past and this has worked well.  She denies this more freq but just needs a RF on script for PRN.  She has had cardiac work up but has been about 2 years or so and she denies following with cardio recently - suggest the benefit for updated visit with cardio given the duration since last ween          Preventative Testing   Mammo - april 2024  DEXA- april 2024 osteopenia   colonoscopy nov 2019 - dr chun - repeat in 5 years -   PAP - 2018 - hx of hysterectomy   Lung nodule CT chest dec 2023 stable compares to 2021   Depression screen - PHQ2 NEG Sept 2024   Fall -NEG Sept 2024     Over 15 minutes was spent discussing obesity - how this affects lab results, complicates medical problems and discussed various treatment options including diet and exercise and medications if needed- she is  "limited bc of mobility limitation with the PD and other symptoms          Patient Care Team:  Lynda Ibarra PA-C as PCP - General     Review of Systems   Constitutional:  Positive for fatigue. Negative for chills and fever.   HENT:  Negative for congestion, rhinorrhea, sinus pain, sore throat and tinnitus.    Eyes:  Negative for discharge, redness and visual disturbance.   Respiratory:  Negative for cough, chest tightness, shortness of breath and wheezing.    Cardiovascular:  Negative for chest pain, palpitations and leg swelling.   Gastrointestinal:  Negative for abdominal pain, constipation, diarrhea, nausea and vomiting.   Endocrine: Negative for cold intolerance and heat intolerance.   Genitourinary:  Negative for flank pain, frequency and urgency.   Musculoskeletal:  Negative for back pain, gait problem and neck pain.   Skin:  Negative for rash and wound.   Neurological:  Negative for dizziness, tremors, syncope, numbness and headaches.   Hematological:  Does not bruise/bleed easily.   Psychiatric/Behavioral:  Negative for confusion, sleep disturbance and suicidal ideas.          Objective   Vitals:  /73   Pulse 81   Ht 1.753 m (5' 9\")   Wt 103 kg (227 lb)   LMP  (LMP Unknown)   BMI 33.52 kg/m²       Physical Exam  Vitals reviewed.   Constitutional:       Appearance: Normal appearance. She is obese.   HENT:      Head: Normocephalic.      Right Ear: External ear normal.      Left Ear: External ear normal.      Nose: Nose normal. No congestion or rhinorrhea.      Mouth/Throat:      Mouth: Mucous membranes are moist.   Eyes:      Extraocular Movements: Extraocular movements intact.      Conjunctiva/sclera: Conjunctivae normal.      Pupils: Pupils are equal, round, and reactive to light.   Cardiovascular:      Rate and Rhythm: Normal rate and regular rhythm.      Pulses: Normal pulses.   Pulmonary:      Effort: Pulmonary effort is normal.      Breath sounds: Normal breath sounds.   Abdominal:      " General: Bowel sounds are normal.      Palpations: Abdomen is soft.      Tenderness: There is no abdominal tenderness. There is no right CVA tenderness or left CVA tenderness.   Musculoskeletal:         General: No tenderness. Normal range of motion.      Cervical back: Normal range of motion and neck supple. No tenderness.   Skin:     General: Skin is warm and dry.   Neurological:      General: No focal deficit present.      Mental Status: She is alert and oriented to person, place, and time.   Psychiatric:         Mood and Affect: Mood normal.         Behavior: Behavior normal.         Testing   Component      Latest Ref Rng 9/6/2024   WBC      4.4 - 11.3 x10*3/uL 5.2    nRBC      0.0 - 0.0 /100 WBCs 0.0    RBC      4.00 - 5.20 x10*6/uL 4.10    HEMOGLOBIN      12.0 - 16.0 g/dL 12.1    HEMATOCRIT      36.0 - 46.0 % 38.0    MCV      80 - 100 fL 93    MCH      26.0 - 34.0 pg 29.5    MCHC      32.0 - 36.0 g/dL 31.8 (L)    RED CELL DISTRIBUTION WIDTH      11.5 - 14.5 % 12.8    Platelets      150 - 450 x10*3/uL 228    Neutrophils %      40.0 - 80.0 % 60.6    Immature Granulocytes %, Automated      0.0 - 0.9 % 0.2    Lymphocytes %      13.0 - 44.0 % 25.6    Monocytes %      2.0 - 10.0 % 9.7    Eosinophils %      0.0 - 6.0 % 3.7    Basophils %      0.0 - 2.0 % 0.2    Neutrophils Absolute      1.20 - 7.70 x10*3/uL 3.13    Immature Granulocytes Absolute, Automated      0.00 - 0.70 x10*3/uL 0.01    Lymphocytes Absolute      1.20 - 4.80 x10*3/uL 1.32    Monocytes Absolute      0.10 - 1.00 x10*3/uL 0.50    Eosinophils Absolute      0.00 - 0.70 x10*3/uL 0.19    Basophils Absolute      0.00 - 0.10 x10*3/uL 0.01    GLUCOSE      74 - 99 mg/dL 91    SODIUM      136 - 145 mmol/L 140    POTASSIUM      3.5 - 5.3 mmol/L 4.1    CHLORIDE      98 - 107 mmol/L 106    Bicarbonate      21 - 32 mmol/L 26    Anion Gap      10 - 20 mmol/L 12    Blood Urea Nitrogen      6 - 23 mg/dL 21 (C)   Creatinine      0.50 - 1.05 mg/dL 1.07 (H)    EGFR       >60 mL/min/1.73m*2 57 (L)    Calcium      8.6 - 10.3 mg/dL 8.7    Albumin      3.4 - 5.0 g/dL 4.2    Alkaline Phosphatase      33 - 136 U/L 76    Total Protein      6.4 - 8.2 g/dL 6.6    AST      9 - 39 U/L 11    Bilirubin Total      0.0 - 1.2 mg/dL 0.6    ALT      7 - 45 U/L <3 (L)    CHOLESTEROL      0 - 199 mg/dL 127    HDL CHOLESTEROL      mg/dL 41.0    Cholesterol/HDL Ratio 3.1    LDL Calculated      <=99 mg/dL 67    VLDL      0 - 40 mg/dL 19    TRIGLYCERIDES      0 - 149 mg/dL 97    Non HDL Cholesterol      0 - 149 mg/dL 86    Hemoglobin A1C      see below % 5.2    Estimated Average Glucose      Not Established mg/dL 103    Thyroid Stimulating Hormone      0.44 - 3.98 mIU/L 3.96    MAGNESIUM      1.60 - 2.40 mg/dL 2.13    Vitamin B12      211 - 911 pg/mL 402    Vitamin D, 25-Hydroxy, Total      30 - 100 ng/mL 61       Impression     MDM    1) COMPLEXITY: MORE THAN 1 STABLE CHRONIC CONDITION ADDRESSED  2)DATA: TESTS INTERPRETED AND OR ORDERED, TOOK INDEPENDENT HISTORY OR RECORDS REVIEWED  3)RISK: MODERATE RISK DUE TO NATURE OF MEDICAL CONDITIONS/COMORBIDITY OR MEDICATIONS ORDERED OR SURGICAL OR PROCEDURE REFERRAL, .     Reviewed labs and Testing on file   Patient to follow diet low in cholesterol, fat, and sodium.    Patient is advised to increase Exercise.  Patient is recommended to lose weight.  Reviewed Meds and discussed common side effects  Continue as directed   Angina concerns - nitroglycerin Rf but to follow with cardio or advised for us to do further work up   Fatigue - will inc thyroid med and suggest repeat in 6 mo   B12 /D advised to take supplement routinely   Pt was given the flu vaccine in the office and has done weel   Patient is strongly advised to be compliant with recommendations.    Return to Clinic sooner if needed.  Patient denies further questions/concerns at this time     Assessment & Plan  Routine general medical examination at health care facility         Stable angina  (CMS-[facility])    Orders:    nitroglycerin (Nitrostat) 0.4 mg SL tablet; Place 1 tablet (0.4 mg) under the tongue every 5 minutes if needed for chest pain. After the second dose call 911    Hypothyroidism due to acquired atrophy of thyroid    Orders:    levothyroxine (Synthroid, Levoxyl) 50 mcg tablet; Take 1.5 tab(s) on Monday, Wednesday, and Friday and 1 tab daily on all other days    Thyroid Stimulating Hormone; Future    Thyroxine, Free; Future    Vitamin D deficiency    Orders:    Vitamin D 25-Hydroxy,Total (for eval of Vitamin D levels); Future    Vitamin B deficiency    Orders:    Vitamin B12; Future    Chronic renal insufficiency, stage 3 (moderate) (Multi)    Orders:    Iron and TIBC; Future    Ferritin; Future    Benign essential hypertension    Orders:    CBC and Auto Differential; Future    Comprehensive Metabolic Panel; Future    Thyroid Stimulating Hormone; Future    Thyroxine, Free; Future    Iron and TIBC; Future    Ferritin; Future    Magnesium; Future    Vitamin B12; Future    Vitamin D 25-Hydroxy,Total (for eval of Vitamin D levels); Future    Need for influenza vaccination    Orders:    Flu vaccine, trivalent, preservative free, HIGH-DOSE, age 65y+ (Fluzone)    Medicare annual wellness visit, subsequent         Advanced care planning/counseling discussion         Class 1 obesity due to excess calories with serious comorbidity and body mass index (BMI) of 33.0 to 33.9 in adult         Personal history of breast cancer         Parkinson's disease with dyskinesia, unspecified whether manifestations fluctuate (Multi)         Nodule of left lung         Malaise and fatigue                   FU in 6 mo with medicare wellness and labs at [facility] fasting and [facility] check

## 2024-09-19 ENCOUNTER — PHARMACY VISIT (OUTPATIENT)
Dept: PHARMACY | Facility: CLINIC | Age: 64
End: 2024-09-19
Payer: MEDICARE

## 2024-09-24 ENCOUNTER — PREP FOR PROCEDURE (OUTPATIENT)
Dept: PAIN MEDICINE | Facility: CLINIC | Age: 64
End: 2024-09-24
Payer: MEDICARE

## 2024-09-24 DIAGNOSIS — G58.8 CLUNEAL NEUROPATHY: Primary | ICD-10-CM

## 2024-09-27 ENCOUNTER — HOSPITAL ENCOUNTER (OUTPATIENT)
Dept: OPERATING ROOM | Facility: HOSPITAL | Age: 64
Setting detail: OUTPATIENT SURGERY
Discharge: HOME | End: 2024-09-27
Payer: MEDICARE

## 2024-09-27 ENCOUNTER — PHARMACY VISIT (OUTPATIENT)
Dept: PHARMACY | Facility: CLINIC | Age: 64
End: 2024-09-27

## 2024-09-27 VITALS
TEMPERATURE: 98.4 F | BODY MASS INDEX: 33.62 KG/M2 | OXYGEN SATURATION: 96 % | HEIGHT: 69 IN | DIASTOLIC BLOOD PRESSURE: 94 MMHG | SYSTOLIC BLOOD PRESSURE: 129 MMHG | HEART RATE: 72 BPM | WEIGHT: 227 LBS | RESPIRATION RATE: 16 BRPM

## 2024-09-27 DIAGNOSIS — G58.8 CLUNEAL NEUROPATHY: ICD-10-CM

## 2024-09-27 PROCEDURE — 2500000004 HC RX 250 GENERAL PHARMACY W/ HCPCS (ALT 636 FOR OP/ED): Performed by: STUDENT IN AN ORGANIZED HEALTH CARE EDUCATION/TRAINING PROGRAM

## 2024-09-27 PROCEDURE — 2500000005 HC RX 250 GENERAL PHARMACY W/O HCPCS: Performed by: STUDENT IN AN ORGANIZED HEALTH CARE EDUCATION/TRAINING PROGRAM

## 2024-09-27 PROCEDURE — RXMED WILLOW AMBULATORY MEDICATION CHARGE

## 2024-09-27 PROCEDURE — 64450 NJX AA&/STRD OTHER PN/BRANCH: CPT | Mod: 50 | Performed by: STUDENT IN AN ORGANIZED HEALTH CARE EDUCATION/TRAINING PROGRAM

## 2024-09-27 PROCEDURE — 2550000001 HC RX 255 CONTRASTS: Performed by: STUDENT IN AN ORGANIZED HEALTH CARE EDUCATION/TRAINING PROGRAM

## 2024-09-27 RX ORDER — BUPIVACAINE HYDROCHLORIDE 5 MG/ML
INJECTION, SOLUTION PERINEURAL AS NEEDED
Status: COMPLETED | OUTPATIENT
Start: 2024-09-27 | End: 2024-09-27

## 2024-09-27 RX ORDER — LIDOCAINE HYDROCHLORIDE 20 MG/ML
INJECTION, SOLUTION EPIDURAL; INFILTRATION; INTRACAUDAL; PERINEURAL AS NEEDED
Status: COMPLETED | OUTPATIENT
Start: 2024-09-27 | End: 2024-09-27

## 2024-09-27 ASSESSMENT — ENCOUNTER SYMPTOMS
LOSS OF SENSATION IN FEET: 0
DEPRESSION: 0
OCCASIONAL FEELINGS OF UNSTEADINESS: 1

## 2024-09-27 ASSESSMENT — COLUMBIA-SUICIDE SEVERITY RATING SCALE - C-SSRS
1. IN THE PAST MONTH, HAVE YOU WISHED YOU WERE DEAD OR WISHED YOU COULD GO TO SLEEP AND NOT WAKE UP?: NO
2. HAVE YOU ACTUALLY HAD ANY THOUGHTS OF KILLING YOURSELF?: NO
6. HAVE YOU EVER DONE ANYTHING, STARTED TO DO ANYTHING, OR PREPARED TO DO ANYTHING TO END YOUR LIFE?: NO

## 2024-09-27 ASSESSMENT — PAIN - FUNCTIONAL ASSESSMENT
PAIN_FUNCTIONAL_ASSESSMENT: 0-10
PAIN_FUNCTIONAL_ASSESSMENT: 0-10

## 2024-09-27 ASSESSMENT — PAIN SCALES - GENERAL
PAINLEVEL_OUTOF10: 2
PAINLEVEL_OUTOF10: 0 - NO PAIN

## 2024-09-27 ASSESSMENT — PAIN DESCRIPTION - DESCRIPTORS: DESCRIPTORS: ACHING

## 2024-10-01 ENCOUNTER — OFFICE VISIT (OUTPATIENT)
Dept: PAIN MEDICINE | Facility: CLINIC | Age: 64
End: 2024-10-01
Payer: MEDICARE

## 2024-10-01 VITALS
HEART RATE: 88 BPM | BODY MASS INDEX: 33.37 KG/M2 | WEIGHT: 226 LBS | DIASTOLIC BLOOD PRESSURE: 68 MMHG | SYSTOLIC BLOOD PRESSURE: 106 MMHG | RESPIRATION RATE: 16 BRPM

## 2024-10-01 DIAGNOSIS — G58.8 CLUNEAL NEUROPATHY: ICD-10-CM

## 2024-10-01 DIAGNOSIS — M48.062 SPINAL STENOSIS, LUMBAR REGION WITH NEUROGENIC CLAUDICATION: ICD-10-CM

## 2024-10-01 DIAGNOSIS — G89.29 CHRONIC BILATERAL LOW BACK PAIN, UNSPECIFIED WHETHER SCIATICA PRESENT: Primary | ICD-10-CM

## 2024-10-01 DIAGNOSIS — M54.50 CHRONIC BILATERAL LOW BACK PAIN, UNSPECIFIED WHETHER SCIATICA PRESENT: Primary | ICD-10-CM

## 2024-10-01 PROCEDURE — 99213 OFFICE O/P EST LOW 20 MIN: CPT

## 2024-10-01 RX ORDER — LIDOCAINE HYDROCHLORIDE 20 MG/ML
10 INJECTION, SOLUTION EPIDURAL; INFILTRATION; INTRACAUDAL; PERINEURAL ONCE
OUTPATIENT
Start: 2024-10-01 | End: 2024-10-01

## 2024-10-01 RX ORDER — SODIUM CHLORIDE, SODIUM LACTATE, POTASSIUM CHLORIDE, CALCIUM CHLORIDE 600; 310; 30; 20 MG/100ML; MG/100ML; MG/100ML; MG/100ML
20 INJECTION, SOLUTION INTRAVENOUS CONTINUOUS
OUTPATIENT
Start: 2024-10-01

## 2024-10-01 ASSESSMENT — ENCOUNTER SYMPTOMS
BRUISES/BLEEDS EASILY: 0
ADENOPATHY: 0
PALPITATIONS: 0
ARTHRALGIAS: 0
DYSPHORIC MOOD: 0
EYES NEGATIVE: 1
WHEEZING: 0
LIGHT-HEADEDNESS: 0
SHORTNESS OF BREATH: 0
WEAKNESS: 0
COUGH: 0
BACK PAIN: 1
ENDOCRINE NEGATIVE: 1
ALLERGIC/IMMUNOLOGIC NEGATIVE: 1
MYALGIAS: 1
CONSTITUTIONAL NEGATIVE: 1
FACIAL ASYMMETRY: 0

## 2024-10-01 NOTE — PROGRESS NOTES
"Subjective   Patient ID: Nusrat Irizarry is a 64 y.o. female who presents for Pain (FUV for MBB 9-27-24. Patient states she had some relieve for 2-3 hours after injection. Mid low back pain score is 2/10 today. She states pain is less until she starts doing some daily activities. Pain is described as \"pressure.\" If she is up too long it feels like a \"knot.\" Standing increases pain. Nothing helps relieve the pain. ELIZABETH = 56/100. ). Patient is ambulating with a cane.  Tonya Osman RN 10/01/24 10:55 AM     The patient is a 64-year-old female who presents today for a follow-up after undergoing a bilateral superior cluneal nerve block on 9/27/2024.  Patient reports significant relief from this procedure with greater than 80% pain reduction lasting for around 4 hours following the injection.  During that time she was able to increase her level of activity and functioning around the house, and do the things she wants to do that she was previously limited due to the pain.  Given the significant relief she obtained, she is wanting to proceed to the RFA.        Review of Systems   Constitutional: Negative.    HENT: Negative.     Eyes: Negative.    Respiratory:  Negative for cough, shortness of breath and wheezing.    Cardiovascular:  Negative for chest pain, palpitations and leg swelling.   Endocrine: Negative.    Genitourinary: Negative.    Musculoskeletal:  Positive for back pain and myalgias. Negative for arthralgias.   Skin: Negative.    Allergic/Immunologic: Negative.    Neurological:  Negative for facial asymmetry, weakness and light-headedness.   Hematological:  Negative for adenopathy. Does not bruise/bleed easily.   Psychiatric/Behavioral:  Negative for dysphoric mood and suicidal ideas.        Objective   Physical Exam  Constitutional:       General: She is not in acute distress.     Appearance: Normal appearance.   HENT:      Head: Normocephalic.      Mouth/Throat:      Mouth: Mucous membranes are moist. "   Eyes:      Extraocular Movements: Extraocular movements intact.   Cardiovascular:      Rate and Rhythm: Normal rate and regular rhythm.      Pulses: Normal pulses.      Heart sounds: Normal heart sounds. No murmur heard.     No friction rub. No gallop.   Pulmonary:      Effort: Pulmonary effort is normal.      Breath sounds: Normal breath sounds. No wheezing, rhonchi or rales.   Abdominal:      General: Abdomen is flat.      Palpations: Abdomen is soft.   Musculoskeletal:      Cervical back: Normal range of motion.      Right lower leg: No edema.      Left lower leg: No edema.      Comments: Ambulates with a cane  Strength 5/5 BLE  Bilateral lumbar paraspinal tenderness to palpation  Tenderness to palpation bilateral low back region around the belt line   Lymphadenopathy:      Cervical: No cervical adenopathy.   Skin:     General: Skin is warm and dry.   Neurological:      General: No focal deficit present.      Mental Status: She is alert and oriented to person, place, and time. Mental status is at baseline.   Psychiatric:         Mood and Affect: Mood normal.         Behavior: Behavior normal.         Assessment/Plan   Diagnoses and all orders for this visit:  Chronic bilateral low back pain, unspecified whether sciatica present  Spinal stenosis, lumbar region with neurogenic claudication  Cluneal neuropathy  -     FL pain management; Future  -     Radiofrequency Ablation; Future  Other orders  -     lidocaine PF (Xylocaine) 20 mg/mL (2 %) injection 200 mg  -     lidocaine PF (Xylocaine) 20 mg/mL (2 %) injection 200 mg  -     NPO Diet Except: Sips with meds; Effective now; Standing  -     Height and weight; Standing  -     Type And Screen; Standing  -     Inpatient consult to Respiratory Care; Standing  -     lactated Ringer's infusion  -     Adult diet Regular; Standing  -     Vital Signs; Standing  -     Notify physician - Standard Parameters; Standing  -     Continue IV fluids ordered pre-procedure;  Standing  -     Prior to Discharge O2 Weaning; Standing  -     Pulse oximetry, continuous; Standing  -     Discharge patient; Standing       The patient is a 64-year-old female with a past medical history significant for the above-mentioned problems.  She is following up after a bilateral superior cluneal nerve block with significant relief of around 4 hours.  During that time she was able to increase her level of activity and functioning around the house.  The pain has since returned, currently only rated a 2/10, she says the days still young and that the pain tends to increase throughout the day as she moves around.  Given the meaningful relief she obtained following the nerve block, she is wanting to proceed with the RFA.  We will proceed with bilateral superior cluneal RFA under fluoroscopy.  The procedure was discussed, risks and benefits were discussed, patient is agreeable.  Med holds include aspirin for 6 days and vitamin/supplements for 1 week.  She will follow-up after the injection for reevaluation, call the clinic sooner if needed.

## 2024-10-02 ENCOUNTER — TELEPHONE (OUTPATIENT)
Dept: PAIN MEDICINE | Facility: CLINIC | Age: 64
End: 2024-10-02
Payer: MEDICARE

## 2024-10-04 ENCOUNTER — TELEPHONE (OUTPATIENT)
Dept: PAIN MEDICINE | Facility: CLINIC | Age: 64
End: 2024-10-04
Payer: MEDICARE

## 2024-10-21 PROCEDURE — RXMED WILLOW AMBULATORY MEDICATION CHARGE

## 2024-10-22 ENCOUNTER — PHARMACY VISIT (OUTPATIENT)
Dept: PHARMACY | Facility: CLINIC | Age: 64
End: 2024-10-22
Payer: MEDICARE

## 2024-10-22 PROCEDURE — RXMED WILLOW AMBULATORY MEDICATION CHARGE

## 2024-10-24 PROCEDURE — RXMED WILLOW AMBULATORY MEDICATION CHARGE

## 2024-10-25 ENCOUNTER — HOSPITAL ENCOUNTER (OUTPATIENT)
Dept: OPERATING ROOM | Facility: HOSPITAL | Age: 64
Setting detail: OUTPATIENT SURGERY
Discharge: HOME | End: 2024-10-25
Payer: MEDICARE

## 2024-10-25 VITALS
TEMPERATURE: 97.8 F | RESPIRATION RATE: 16 BRPM | OXYGEN SATURATION: 99 % | BODY MASS INDEX: 33.23 KG/M2 | WEIGHT: 225 LBS | HEART RATE: 68 BPM | DIASTOLIC BLOOD PRESSURE: 80 MMHG | SYSTOLIC BLOOD PRESSURE: 118 MMHG

## 2024-10-25 DIAGNOSIS — G58.8 CLUNEAL NEUROPATHY: ICD-10-CM

## 2024-10-25 PROCEDURE — 77002 NEEDLE LOCALIZATION BY XRAY: CPT | Performed by: STUDENT IN AN ORGANIZED HEALTH CARE EDUCATION/TRAINING PROGRAM

## 2024-10-25 PROCEDURE — 2720000007 HC OR 272 NO HCPCS

## 2024-10-25 RX ORDER — SODIUM CHLORIDE, SODIUM LACTATE, POTASSIUM CHLORIDE, CALCIUM CHLORIDE 600; 310; 30; 20 MG/100ML; MG/100ML; MG/100ML; MG/100ML
20 INJECTION, SOLUTION INTRAVENOUS CONTINUOUS
Status: DISCONTINUED | OUTPATIENT
Start: 2024-10-25 | End: 2024-10-26 | Stop reason: HOSPADM

## 2024-10-25 ASSESSMENT — PAIN SCALES - GENERAL
PAINLEVEL_OUTOF10: 2
PAINLEVEL_OUTOF10: 0 - NO PAIN
PAINLEVEL_OUTOF10: 0 - NO PAIN

## 2024-10-25 ASSESSMENT — PAIN - FUNCTIONAL ASSESSMENT
PAIN_FUNCTIONAL_ASSESSMENT: 0-10
PAIN_FUNCTIONAL_ASSESSMENT: 0-10

## 2024-10-25 NOTE — PERIOPERATIVE NURSING NOTE
Patient discharged to surgical waiting room.  [place] steady.  Tolerated well.   is patient's sister Berta.

## 2024-10-25 NOTE — PERIOPERATIVE NURSING NOTE
Patient arrived back to unit.  Awake and provided with a beverage.  Vital signs obtained.  Tolerated well.

## 2024-10-25 NOTE — Clinical Note
ARRIVED TO ROOM PER CART AND MOVED SELF TO TABLE.  PATIENT IN PRONE POSITION PADDED WITH PILLOWS, PREPPED WITH CHLOROPREP PER DR OLGUIN, FIRE RISK SCORE 2 FOR ALCOHOL PREP AND RFA PROBE, 3 MINUTE DRY TIME OBSERVED BEFORE DRAPING.  RFA UNIT  SET TO 80 DEGREES C FOR 80 SECONDS X 2 BURNS, GROUNDING PAD SITE CLEAR BEFORE AND AFTER PROCEDURE.  OPSITE DRESSED WITH BANDAID, REPORT GIVEN TO YESSI CARDOSO RN.

## 2024-10-25 NOTE — PERIOPERATIVE NURSING NOTE
Patient sitting on side of bed.  Tolerated well.  Discharge instructions reviewed with patient.  Patient provided with a beverage.

## 2024-10-28 ENCOUNTER — OFFICE VISIT (OUTPATIENT)
Dept: HEMATOLOGY/ONCOLOGY | Facility: CLINIC | Age: 64
End: 2024-10-28
Payer: MEDICARE

## 2024-10-28 ENCOUNTER — PHARMACY VISIT (OUTPATIENT)
Dept: PHARMACY | Facility: CLINIC | Age: 64
End: 2024-10-28
Payer: MEDICARE

## 2024-10-28 VITALS
RESPIRATION RATE: 16 BRPM | TEMPERATURE: 96.4 F | HEART RATE: 84 BPM | WEIGHT: 226.2 LBS | BODY MASS INDEX: 33.4 KG/M2 | SYSTOLIC BLOOD PRESSURE: 110 MMHG | OXYGEN SATURATION: 95 % | DIASTOLIC BLOOD PRESSURE: 73 MMHG

## 2024-10-28 DIAGNOSIS — R20.0 NUMBNESS AND TINGLING IN BOTH HANDS: ICD-10-CM

## 2024-10-28 DIAGNOSIS — R20.2 NUMBNESS AND TINGLING IN BOTH HANDS: ICD-10-CM

## 2024-10-28 DIAGNOSIS — G62.9 NEUROPATHY: ICD-10-CM

## 2024-10-28 DIAGNOSIS — M54.9 BACK PAIN, UNSPECIFIED BACK LOCATION, UNSPECIFIED BACK PAIN LATERALITY, UNSPECIFIED CHRONICITY: ICD-10-CM

## 2024-10-28 DIAGNOSIS — R91.8 LUNG NODULES: Primary | ICD-10-CM

## 2024-10-28 DIAGNOSIS — Z85.3 PERSONAL HISTORY OF BREAST CANCER: ICD-10-CM

## 2024-10-28 PROCEDURE — RXMED WILLOW AMBULATORY MEDICATION CHARGE

## 2024-10-28 PROCEDURE — 1125F AMNT PAIN NOTED PAIN PRSNT: CPT

## 2024-10-28 PROCEDURE — 99214 OFFICE O/P EST MOD 30 MIN: CPT

## 2024-10-28 PROCEDURE — 1159F MED LIST DOCD IN RCRD: CPT

## 2024-10-28 PROCEDURE — 3078F DIAST BP <80 MM HG: CPT

## 2024-10-28 PROCEDURE — 3074F SYST BP LT 130 MM HG: CPT

## 2024-10-28 RX ORDER — GABAPENTIN 300 MG/1
300 CAPSULE ORAL 4 TIMES DAILY
Qty: 120 CAPSULE | Refills: 2 | Status: SHIPPED | OUTPATIENT
Start: 2024-10-28

## 2024-10-28 RX ORDER — METHYLPREDNISOLONE 4 MG/1
TABLET ORAL
Qty: 21 TABLET | Refills: 0 | Status: SHIPPED | OUTPATIENT
Start: 2024-10-28 | End: 2024-11-04

## 2024-10-28 RX ORDER — LETROZOLE 2.5 MG/1
2.5 TABLET, FILM COATED ORAL DAILY
Qty: 90 TABLET | Refills: 2 | Status: SHIPPED | OUTPATIENT
Start: 2024-10-28

## 2024-10-28 ASSESSMENT — PAIN SCALES - GENERAL: PAINLEVEL_OUTOF10: 5

## 2024-10-29 DIAGNOSIS — Z12.11 COLON CANCER SCREENING: ICD-10-CM

## 2024-11-05 ENCOUNTER — LAB (OUTPATIENT)
Dept: LAB | Facility: LAB | Age: 64
End: 2024-11-05
Payer: MEDICARE

## 2024-11-05 DIAGNOSIS — Z85.3 PERSONAL HISTORY OF BREAST CANCER: ICD-10-CM

## 2024-11-05 DIAGNOSIS — R91.8 LUNG NODULES: ICD-10-CM

## 2024-11-05 DIAGNOSIS — R20.2 NUMBNESS AND TINGLING IN BOTH HANDS: ICD-10-CM

## 2024-11-05 DIAGNOSIS — M54.9 BACK PAIN, UNSPECIFIED BACK LOCATION, UNSPECIFIED BACK PAIN LATERALITY, UNSPECIFIED CHRONICITY: ICD-10-CM

## 2024-11-05 DIAGNOSIS — R20.0 NUMBNESS AND TINGLING IN BOTH HANDS: ICD-10-CM

## 2024-11-05 DIAGNOSIS — G62.9 NEUROPATHY: ICD-10-CM

## 2024-11-05 LAB
ALBUMIN SERPL BCP-MCNC: 3.9 G/DL (ref 3.4–5)
ALP SERPL-CCNC: 73 U/L (ref 33–136)
ALT SERPL W P-5'-P-CCNC: 3 U/L (ref 7–45)
ANION GAP SERPL CALC-SCNC: 9 MMOL/L (ref 10–20)
AST SERPL W P-5'-P-CCNC: 9 U/L (ref 9–39)
BASOPHILS # BLD AUTO: 0.02 X10*3/UL (ref 0–0.1)
BASOPHILS NFR BLD AUTO: 0.4 %
BILIRUB SERPL-MCNC: 0.6 MG/DL (ref 0–1.2)
BUN SERPL-MCNC: 24 MG/DL (ref 6–23)
CALCIUM SERPL-MCNC: 8.5 MG/DL (ref 8.6–10.3)
CHLORIDE SERPL-SCNC: 104 MMOL/L (ref 98–107)
CO2 SERPL-SCNC: 30 MMOL/L (ref 21–32)
CREAT SERPL-MCNC: 1.08 MG/DL (ref 0.5–1.05)
EGFRCR SERPLBLD CKD-EPI 2021: 57 ML/MIN/1.73M*2
EOSINOPHIL # BLD AUTO: 0.14 X10*3/UL (ref 0–0.7)
EOSINOPHIL NFR BLD AUTO: 2.5 %
ERYTHROCYTE [DISTWIDTH] IN BLOOD BY AUTOMATED COUNT: 13 % (ref 11.5–14.5)
GLUCOSE SERPL-MCNC: 84 MG/DL (ref 74–99)
HCT VFR BLD AUTO: 39.1 % (ref 36–46)
HGB BLD-MCNC: 12.6 G/DL (ref 12–16)
IMM GRANULOCYTES # BLD AUTO: 0.01 X10*3/UL (ref 0–0.7)
IMM GRANULOCYTES NFR BLD AUTO: 0.2 % (ref 0–0.9)
LYMPHOCYTES # BLD AUTO: 1.32 X10*3/UL (ref 1.2–4.8)
LYMPHOCYTES NFR BLD AUTO: 23.6 %
MCH RBC QN AUTO: 29.6 PG (ref 26–34)
MCHC RBC AUTO-ENTMCNC: 32.2 G/DL (ref 32–36)
MCV RBC AUTO: 92 FL (ref 80–100)
MONOCYTES # BLD AUTO: 0.61 X10*3/UL (ref 0.1–1)
MONOCYTES NFR BLD AUTO: 10.9 %
NEUTROPHILS # BLD AUTO: 3.49 X10*3/UL (ref 1.2–7.7)
NEUTROPHILS NFR BLD AUTO: 62.4 %
NRBC BLD-RTO: 0 /100 WBCS (ref 0–0)
PLATELET # BLD AUTO: 248 X10*3/UL (ref 150–450)
POTASSIUM SERPL-SCNC: 4.3 MMOL/L (ref 3.5–5.3)
PROT SERPL-MCNC: 5.9 G/DL (ref 6.4–8.2)
RBC # BLD AUTO: 4.26 X10*6/UL (ref 4–5.2)
SODIUM SERPL-SCNC: 139 MMOL/L (ref 136–145)
WBC # BLD AUTO: 5.6 X10*3/UL (ref 4.4–11.3)

## 2024-11-05 PROCEDURE — 80053 COMPREHEN METABOLIC PANEL: CPT

## 2024-11-05 PROCEDURE — 36415 COLL VENOUS BLD VENIPUNCTURE: CPT

## 2024-11-05 PROCEDURE — 85025 COMPLETE CBC W/AUTO DIFF WBC: CPT

## 2024-11-08 ENCOUNTER — HOSPITAL ENCOUNTER (OUTPATIENT)
Dept: RADIOLOGY | Facility: HOSPITAL | Age: 64
Discharge: HOME | End: 2024-11-08
Payer: MEDICARE

## 2024-11-08 DIAGNOSIS — Z85.3 PERSONAL HISTORY OF BREAST CANCER: ICD-10-CM

## 2024-11-08 DIAGNOSIS — R91.8 LUNG NODULES: ICD-10-CM

## 2024-11-08 PROCEDURE — 2550000001 HC RX 255 CONTRASTS

## 2024-11-08 PROCEDURE — 71260 CT THORAX DX C+: CPT

## 2024-11-12 ENCOUNTER — OFFICE VISIT (OUTPATIENT)
Dept: HEMATOLOGY/ONCOLOGY | Facility: CLINIC | Age: 64
End: 2024-11-12
Payer: MEDICARE

## 2024-11-12 VITALS
SYSTOLIC BLOOD PRESSURE: 118 MMHG | TEMPERATURE: 96.6 F | WEIGHT: 226.6 LBS | OXYGEN SATURATION: 96 % | HEART RATE: 82 BPM | RESPIRATION RATE: 16 BRPM | BODY MASS INDEX: 33.46 KG/M2 | DIASTOLIC BLOOD PRESSURE: 82 MMHG

## 2024-11-12 DIAGNOSIS — Z12.31 ENCOUNTER FOR SCREENING MAMMOGRAM FOR HIGH-RISK PATIENT: ICD-10-CM

## 2024-11-12 DIAGNOSIS — R20.2 NUMBNESS AND TINGLING IN BOTH HANDS: ICD-10-CM

## 2024-11-12 DIAGNOSIS — Z85.3 PERSONAL HISTORY OF BREAST CANCER: ICD-10-CM

## 2024-11-12 DIAGNOSIS — M54.9 BACK PAIN, UNSPECIFIED BACK LOCATION, UNSPECIFIED BACK PAIN LATERALITY, UNSPECIFIED CHRONICITY: ICD-10-CM

## 2024-11-12 DIAGNOSIS — G62.9 NEUROPATHY: ICD-10-CM

## 2024-11-12 DIAGNOSIS — R91.8 LUNG NODULES: ICD-10-CM

## 2024-11-12 DIAGNOSIS — R20.0 NUMBNESS AND TINGLING IN BOTH HANDS: ICD-10-CM

## 2024-11-12 PROCEDURE — 99214 OFFICE O/P EST MOD 30 MIN: CPT

## 2024-11-12 PROCEDURE — 1159F MED LIST DOCD IN RCRD: CPT

## 2024-11-12 PROCEDURE — 3079F DIAST BP 80-89 MM HG: CPT

## 2024-11-12 PROCEDURE — 1125F AMNT PAIN NOTED PAIN PRSNT: CPT

## 2024-11-12 PROCEDURE — 3074F SYST BP LT 130 MM HG: CPT

## 2024-11-12 ASSESSMENT — PAIN SCALES - GENERAL: PAINLEVEL_OUTOF10: 4

## 2024-11-12 NOTE — PROGRESS NOTES
Patient ID: Misty Viera is a 64 y.o. female.    Subjective    HPI  HPI  History of Present Illness:      ID Statement:    MISTY VIERA is a 62 year old Female        Chief Complaint: Evaluation for breast cancer   Interval History:    Referred by STELLA Spencer     Reason for referral: breast cancer      HPI  61 year old woman with hx of obesity, HTN, angina pectoris, Parkinson’s disease, carpal tunnel syndrome, lumbar spondylosis, HL,  hypothyroidism, mitral valve prolapse neuropathy, pericardial effusion, ASD s/p closure, lung nodule on CT of the chest in June 2021 presenting for evaluation of breast cancer   Hx of hysterectomy / post menopausal   Breast cancer hx   March 2020: abnormal mammogram followed by US showing 1.6 cm mass in right breast at 5 o’clock , biopsy showed IDC   Surgery with Dr. Mo in [place] in march 2020: right lumpectomy and SLNBx: IDC, grade II, ER positive (99%), IN positive (5%), Her2 negative, final size 1.8 cm, margins negative  (closest margin 4 mm), DCIS present 1 LN with micrometasis deposit of 1 mm.   Oncotype score of 29  Received AC (adriamycin, cyclophosphamide) x 2 cycles in April/May 2020 with multiple complications so did not complete chemotherapy , she had low BP and a lot of side effects and dehydration, thrush   she is still on mid  Received radiation 7/14/20- 8/24/20   Started femara afterwards around Sep 2020  Last mmg march 2021- due march 2022   On zometa Q 6 m adjuvant, dexa due April 2022      last Zometa on 12/30/21      interval history - 10/28/24     Chronic Fatigue, remains about the same, ongoing since chemo treatment   continues have hot flashes, tolerable   remains taking Femara, tolerating well      Repeat back ablation on 10/25/24, currently being evaluated   chronic joint pain, is currently on gabapentin 300mg 4 times daily, helps manage pain  follows with pain management      Tremors increasing, recently placed entacapone   No  recent falls   Balance remains slightly off, no new falls   No nose bleeds or abnormal bleeding   Bruising on elbows     no fever or infections   No new breast concerns   No urinary issues   No UTIs or yeast infections, or vaginal bleeding  WRIGHT with stairs or moving items   No cough  No chest pain or pressure   Chronic constipation, under control with bowel regimen of miralax, and two prunes nightly   Next colonoscopy due next year      No breast concerns, changes, no nipple discharge     11/12/24     PAST MEDICAL HISTORY:   obesity, HTN, angina pectoris, Parkinson’s disease, carpal tunnel syndrome, lumbar spondylosis, HL, hypothyroidism, mitral valve prolapse neuropathy, pericardial effusion, ASD s/p closure     SOCIAL HISTORY:   never smoker   no children   lives with her    she was  for insurance agency      FAMILY HISTORY:    maternal grandmother had breast cancer in her 60s   sister had lung cancer, non smoker   No other specific history of bleeding, clotting or malignant disorder in the family.       Objective    BSA: There is no height or weight on file to calculate BSA.  LMP  (LMP Unknown)      Physical Exam  Vitals and nursing note reviewed.   Constitutional:       Appearance: Normal appearance.   HENT:      Head: Normocephalic and atraumatic.      Nose: Nose normal.      Mouth/Throat:      Mouth: Mucous membranes are moist.      Pharynx: Oropharynx is clear.   Eyes:      General: No scleral icterus.     Extraocular Movements: Extraocular movements intact.      Conjunctiva/sclera: Conjunctivae normal.   Cardiovascular:      Rate and Rhythm: Normal rate and regular rhythm.      Pulses: Normal pulses.      Heart sounds: Normal heart sounds.   Pulmonary:      Effort: Pulmonary effort is normal.      Breath sounds: Normal breath sounds.   Chest:   Breasts:     Right: Skin change present. No swelling, bleeding, inverted nipple, mass, nipple discharge or tenderness.      Left: Normal.  No swelling, bleeding, inverted nipple, mass, nipple discharge, skin change or tenderness.      Comments: s/p right lumpectomy, scar and thickened tissue inframammary area   Abdominal:      Palpations: Abdomen is soft.   Musculoskeletal:      Cervical back: Normal range of motion and neck supple.   Lymphadenopathy:      Upper Body:      Right upper body: No supraclavicular, axillary or pectoral adenopathy.      Left upper body: No supraclavicular, axillary or pectoral adenopathy.   Skin:     General: Skin is warm and dry.      Coloration: Skin is pale.   Neurological:      General: No focal deficit present.      Mental Status: She is alert and oriented to person, place, and time. Mental status is at baseline.      Motor: Weakness and tremor present.      Gait: Gait abnormal.   Psychiatric:         Mood and Affect: Mood normal.         Behavior: Behavior normal.         Thought Content: Thought content normal.         Judgment: Judgment normal.         Performance Status:  Asymptomatic      Assessment/Plan        1. Right breast cancer   [id number] right breast cancer, ER positive , OR weekly positive and her 2 negative   high risk oncotype of 29  s/p lumpectomy , 2 cycles of AC with poor tolerance and adjuvant radiation   on AI with femara since Aug/Sep 2020   continue for minimum 5 yrs   she is having body aches and joint pains and would like to try and wean down gabapentin as she doesn't know if it actually helping , however would like to continue same therapy now, will monitor and consider alternative AI or toro if worse pain   mmg due march 2022   dexa scan due April 2022 7/28/22- she still has myalgias and arthralgias but overall no major changes, plan to continue Femara   her dexa scan April 2022 showed osteopenia and decreased BMD, continue Zometa and we discussed switch to Tamoxifen if further BMD loss   MMG was benign in April, will schedule next yr   no new clinical concerns otherwise today       1/12/23- she is tolerating AI with Femara fairly  her arthralgias are controlled in general   plan to continue with same for 5 yrs at least   MMG schedule in April 2023, she was instructed to call for results      6/29/23: remains taking Femara, tolerating with no new concerns  continues to have fatigue, and body aches managed with gabapentin   discussed continuation with Femara vs. trialing a different AI,   pt desires to proceed with Femara at this time  completes 3 years in Aug/Sep., plan to continue for a duration of 5 yrs  continue surveillance with yearly MMG  MMG and Dexa scan will be due April 2024     10/28/24:  Completed 4 years on Femara, remains having hot flashes, fatigue, myalgias/arthralgias - unchanged, Discussed taking a holding from medication and trying alternative AI, pt prefers to continue as same and try and complete the duration of treatment.    Plan is continuation for a duration of 5 years  Patient has complete zometa  MMG as of 4/26/24- reported no evidence of malignancy, annual surveillance         2. Bone health   continue Zometa adjuvant dosing 4 mg Q 6 m for 3 yrs , will give today    dexa scan April 2022 with osteropenia, repeat in 2 yrs     she had received ~ 1-2 yrs of Zometa at OSH so next dose will be last dose      6/29/23 will commence Zometa treatment today. patient meets parameters to treat today.     1/4/24- repeat bone density due in April 2024 4/26/24- Bone density   Spinal L1-L4/bone density 1.283, T-score 0.7, Z-score 2.3  Left femur total-bone density 0.950, T-score -0.5, Z-score 0.8  Left femur neck-bone density 0.830, T-score -1.5, Z-score 0.0  Right femur total-bone density 0.929, T-score -0.6, Z-score 0.6  Right femur neck-bone density 0.796, T-score -1.7, Z-score -0.3  10-year fracture risk, major osteoporotic fracture risk 9.1, hip fracture 1.1  Classification is low bone mass (osteopenia) follow-up recommended in 2 years     3. Pericardial effusion hx   seems  to have happened post her chemo   she followed cardiology for monitor and has a follow up in feb 2022      4. Lung nodule   CT scan showed fibrotic changes and stable sized nodule     6/29/23- schedule repeat CT scan prior to next follow-up     1/4/24 Repeat CT scan reported unchanged pulmonary nodules-   measuring up to 0.3 cm as compared with exams dating back to  06/10/2021. No new or enlarging discrete suspicious pulmonary nodule  Stable postradiation fibrosis within anterior right upper and middle lobes   No new acute cardiopulmonary process.  Stable postoperative changes of right breast lumpectomy and right  axillary lymph node dissection without evidence of disease recurrence.     Discussed repeating in 6 months, then can be repeated annually.        10/28/24- will schedule for repeat CT scan at this time     11/12/24: Patient is in office to discuss recent CT Scan. Imaging reported stable pulmonary nodules- measuring up to 3 mm, no new pulmonary nodules. Patient does have an enlarged pulmonary artery measuring up to 3.6 cm, which can be associated with pulmonary hypertension, patient is asymptomatic at this time. Discussed ordering an Echo at this time for further evaluation.      Will repeat CT scan in 6 months. Patient is aware to call on the interm with any questions or concerns.     5. Fatigue   will check TSH, b12, folic acid, ferritin      Plan:  Discussed and reviewed medical history   Reviewed CT Scan, lung nodules remain stable. Noted pulmonary artery is enlarged to 3.6 cm  Continue monitoring lung nodules with repeat CT in 6 months  Discussed ordering Echo  Due for repeat annual mammogram in 6 months  Return to see APRN in 6 months with labs prior (CBC w/diff, CMP)    (Will be due for a breast exam at this visit)       Lynda Covarrubias, YOHANA-CNP

## 2024-11-12 NOTE — PROGRESS NOTES
Patient will return in [address] [place] [address] for OV on 5/19/25 @ 1030.  She will get mammo and CT done 1 week prior 5/12/25.  She will also get her blood work drawn the week prior to her CT scan.  AVS reviewed with patient.

## 2024-11-12 NOTE — PATIENT INSTRUCTIONS
Discussed and reviewed medical history   Reviewed CT Scan, lung nodules remain stable. Noted pulmonary artery is enlarged to 3.6 cm  Continue monitoring lung nodules with repeat CT in 6 months  Discussed ordering Echo  Due for repeat annual mammogram in 6 months  Return to see APRN in 6 months with labs prior (CBC w/diff, CMP)    (Will be due for a breast exam at this visit)

## 2024-11-18 ENCOUNTER — OFFICE VISIT (OUTPATIENT)
Dept: PAIN MEDICINE | Facility: CLINIC | Age: 64
End: 2024-11-18
Payer: MEDICARE

## 2024-11-18 VITALS
WEIGHT: 228 LBS | DIASTOLIC BLOOD PRESSURE: 59 MMHG | BODY MASS INDEX: 33.67 KG/M2 | HEART RATE: 85 BPM | SYSTOLIC BLOOD PRESSURE: 87 MMHG | RESPIRATION RATE: 16 BRPM

## 2024-11-18 DIAGNOSIS — M48.062 SPINAL STENOSIS, LUMBAR REGION WITH NEUROGENIC CLAUDICATION: ICD-10-CM

## 2024-11-18 DIAGNOSIS — G58.8 CLUNEAL NEUROPATHY: Primary | ICD-10-CM

## 2024-11-18 PROCEDURE — 99213 OFFICE O/P EST LOW 20 MIN: CPT

## 2024-11-18 ASSESSMENT — ENCOUNTER SYMPTOMS
ADENOPATHY: 0
FACIAL ASYMMETRY: 0
MYALGIAS: 1
BRUISES/BLEEDS EASILY: 0
SHORTNESS OF BREATH: 0
WEAKNESS: 0
ALLERGIC/IMMUNOLOGIC NEGATIVE: 1
LIGHT-HEADEDNESS: 0
COUGH: 0
BACK PAIN: 1
WHEEZING: 0
DYSPHORIC MOOD: 0
ARTHRALGIAS: 0
EYES NEGATIVE: 1
CONSTITUTIONAL NEGATIVE: 1
PALPITATIONS: 0
ENDOCRINE NEGATIVE: 1

## 2024-11-18 NOTE — PROGRESS NOTES
"Subjective   Patient ID: Nusrat Irizarry is a 64 y.o. female who presents for Follow-up ([facility]UV for Bilat Superior Cluneal RFA on 10/25/24 she reports 75% relief  starting last week she was having a lot of Hip pain after the RFA that went away last week. She  is happy with the relief. Today she reports having pain in her bilat lower back rates 2/10 now and 6/10 at worst with activity like standing, describes as \"pressure like being compressed\". She is taking Tylenol it relieves her pain some but she is still having pain that bothers her ADLs.  ) She would like to discuss medication that will help her pain.   ELIZABETH score 60%.   Mel Reddy RN 11/18/24 8:58 AM       The patient is a 64-year-old female who presents today after undergoing a bilateral superior cluneal radiofrequency ablation on 10/25/2024.  The patient reports significant relief from this injection with around 75 to 80% pain reduction overall.  She currently rates her pain a 2/10, but says that occasionally will get up to a 6/10 at its worst, described more as a pressure across her lower back.  The remaining pain is currently manageable typically with Tylenol, but she is asking if there is anything else we have to offer that could provide better pain relief.  She receives gabapentin 300 mg 4 times a day from her PCP for neuropathy pain.        Review of Systems   Constitutional: Negative.    HENT: Negative.     Eyes: Negative.    Respiratory:  Negative for cough, shortness of breath and wheezing.    Cardiovascular:  Negative for chest pain, palpitations and leg swelling.   Endocrine: Negative.    Genitourinary: Negative.    Musculoskeletal:  Positive for back pain and myalgias. Negative for arthralgias.   Skin: Negative.    Allergic/Immunologic: Negative.    Neurological:  Negative for facial asymmetry, weakness and light-headedness.   Hematological:  Negative for adenopathy. Does not bruise/bleed easily.   Psychiatric/Behavioral:  Negative for dysphoric " mood and suicidal ideas.        Objective   Physical Exam  Constitutional:       General: She is not in acute distress.     Appearance: Normal appearance.   HENT:      Head: Normocephalic.      Mouth/Throat:      Mouth: Mucous membranes are moist.   Eyes:      Extraocular Movements: Extraocular movements intact.   Cardiovascular:      Rate and Rhythm: Normal rate and regular rhythm.      Pulses: Normal pulses.      Heart sounds: Normal heart sounds. No murmur heard.     No friction rub. No gallop.   Pulmonary:      Effort: Pulmonary effort is normal.      Breath sounds: Normal breath sounds. No wheezing, rhonchi or rales.   Abdominal:      General: Abdomen is flat.      Palpations: Abdomen is soft.   Musculoskeletal:      Cervical back: Normal range of motion.      Right lower leg: No edema.      Left lower leg: No edema.      Comments: Ambulates with a cane strength 5/5 BLE   Lymphadenopathy:      Cervical: No cervical adenopathy.   Skin:     General: Skin is warm and dry.   Neurological:      General: No focal deficit present.      Mental Status: She is alert and oriented to person, place, and time. Mental status is at baseline.   Psychiatric:         Mood and Affect: Mood normal.         Behavior: Behavior normal.         Assessment/Plan   Diagnoses and all orders for this visit:  Cluneal neuropathy  Spinal stenosis, lumbar region with neurogenic claudication       Patient is a 64-year-old female with a past medical history significant for the above-mentioned problems.  She is following up after bilateral superior cluneal RFA with significant ongoing relief.  She also has had a lumbar RFA in June with ongoing right sided relief, and has had a right sided SI joint injection in the past as well.  She says Tylenol currently helps take the edge off of the remaining pain, but is wondering if there is any other medication we have to offer to help with the pain.  We discussed either increasing her current gabapentin  dose or changing to Lyrica, but patient would like to stay at the dose she is at for right now.  We also discussed trying a ketamine compound cream, which the patient would like to try.  PDMP reviewed, Rx will be sent.  She will follow-up in 3 months for reevaluation, call the clinic sooner if needed.

## 2024-11-22 ENCOUNTER — HOSPITAL ENCOUNTER (OUTPATIENT)
Dept: GASTROENTEROLOGY | Facility: CLINIC | Age: 64
Discharge: HOME | End: 2024-11-22
Payer: MEDICARE

## 2024-11-22 VITALS
BODY MASS INDEX: 32.7 KG/M2 | DIASTOLIC BLOOD PRESSURE: 68 MMHG | OXYGEN SATURATION: 94 % | TEMPERATURE: 97.3 F | HEIGHT: 69 IN | RESPIRATION RATE: 14 BRPM | SYSTOLIC BLOOD PRESSURE: 100 MMHG | HEART RATE: 73 BPM | WEIGHT: 220.8 LBS

## 2024-11-22 DIAGNOSIS — Z12.11 COLON CANCER SCREENING: ICD-10-CM

## 2024-11-22 DIAGNOSIS — K59.01 SLOW TRANSIT CONSTIPATION: Primary | ICD-10-CM

## 2024-11-22 PROCEDURE — 45378 DIAGNOSTIC COLONOSCOPY: CPT | Performed by: INTERNAL MEDICINE

## 2024-11-22 PROCEDURE — 3700000012 HC SEDATION LEVEL 5+ TIME - INITIAL 15 MINUTES 5/> YEARS

## 2024-11-22 PROCEDURE — 7100000009 HC PHASE TWO TIME - INITIAL BASE CHARGE

## 2024-11-22 PROCEDURE — 7100000010 HC PHASE TWO TIME - EACH INCREMENTAL 1 MINUTE

## 2024-11-22 PROCEDURE — RXMED WILLOW AMBULATORY MEDICATION CHARGE

## 2024-11-22 PROCEDURE — G0121 COLON CA SCRN NOT HI RSK IND: HCPCS | Performed by: INTERNAL MEDICINE

## 2024-11-22 PROCEDURE — G0500 MOD SEDAT ENDO SERVICE >5YRS: HCPCS | Performed by: INTERNAL MEDICINE

## 2024-11-22 PROCEDURE — 2500000004 HC RX 250 GENERAL PHARMACY W/ HCPCS (ALT 636 FOR OP/ED): Performed by: INTERNAL MEDICINE

## 2024-11-22 RX ORDER — LACTULOSE 10 G/15ML
SOLUTION ORAL NIGHTLY
Qty: 450 ML | Refills: 0 | Status: SHIPPED | OUTPATIENT
Start: 2024-11-22

## 2024-11-22 RX ORDER — MIDAZOLAM HYDROCHLORIDE 5 MG/ML
INJECTION, SOLUTION INTRAMUSCULAR; INTRAVENOUS AS NEEDED
Status: COMPLETED | OUTPATIENT
Start: 2024-11-22 | End: 2024-11-22

## 2024-11-22 RX ORDER — MEPERIDINE HYDROCHLORIDE 25 MG/ML
INJECTION INTRAMUSCULAR; INTRAVENOUS; SUBCUTANEOUS AS NEEDED
Status: COMPLETED | OUTPATIENT
Start: 2024-11-22 | End: 2024-11-22

## 2024-11-22 ASSESSMENT — PAIN SCALES - GENERAL
PAINLEVEL_OUTOF10: 0 - NO PAIN

## 2024-11-22 ASSESSMENT — PAIN - FUNCTIONAL ASSESSMENT: PAIN_FUNCTIONAL_ASSESSMENT: 0-10

## 2024-11-22 NOTE — DISCHARGE INSTRUCTIONS
Patient Instructions after a Colonoscopy      The anesthetics, sedatives or narcotics which were given to you today will be acting in your body for the next 24 hours, so you might feel a little sleepy or groggy.  This feeling should slowly wear off. Carefully read and follow the instructions.     You received sedation today:  - Do not drive or operate any machinery or power tools of any kind.   - No alcoholic beverages today, not even beer or wine.  - Do not make any important decisions or sign any legal documents.  - No over the counter medications that contain alcohol or that may cause drowsiness.  - Do not make any important decisions or sign any legal documents.  - Make sure you have someone with you for first 24 hours.    While it is common to experience mild to moderate abdominal distention, gas, or belching after your procedure, if any of these symptoms occur following discharge from the GI Lab or within one week of having your procedure, call the [facility] to be advised whether a visit to your nearest Urgent Care or Emergency Department is indicated.  Take this paper with you if you go.     - If you develop an allergic reaction to the medications that were given during your procedure such as difficulty breathing, rash, hives, severe nausea, vomiting or lightheadedness.  - If you experience chest pain, shortness of breath, severe abdominal pain, fevers and chills.  -If you develop signs and symptoms of bleeding such as blood in your spit, if your stools turn black, tarry, or bloody  - If you have not urinated within 8 hours following your procedure.  - If your IV site becomes painful, red, inflamed, or looks infected.    If you received a biopsy/polypectomy/sphincterotomy the following instructions apply below:    __ Do not use Aspirin containing products, non-steroidal medications or anti-coagulants for one week following your procedure. (Examples of these types of medications are: Advil,  Arthrotec, Aleve, Coumadin, Ecotrin, Heparin, Ibuprofen, Indocin, Motrin, Naprosyn, Nuprin, Plavix, Vioxx, and Voltarin, or their generic forms.  This list is not all-inclusive.  Check with your physician or pharmacist before resuming medications.)   __ Eat a soft diet today.  Avoid foods that are poorly digested for the next 24 hours.  These foods would include: nuts, beans, lettuce, red meats, and fried foods. Start with liquids and advance your diet as tolerated, gradually work up to eating solids.   __ Do not have a Barium Study or Enema for one week.    Your physician recommends the additional following instructions:    -You have a contact number available for emergencies. The signs and symptoms of potential delayed complications were discussed with you. You may return to normal activities tomorrow.  -Resume your previous diet.  -Continue your present medications.   -We are waiting for your pathology results.  -Your physician has recommended a repeat colonoscopy (date to be determined after pending pathology results are reviewed) for surveillance based on pathology results.  -The findings and recommendations have been discussed with you.  -The findings and recommendations were discussed with your family.  - Please see Medication Reconciliation Form for new medication/medications prescribed.       If you experience any problems or have any questions following discharge from the GI Lab, please call:        Nurse Signature                                                                        Date___________________                                                                            Patient/Responsible Party Signature                                        Date___________________

## 2024-11-22 NOTE — H&P
History Of Present Illness  Nusrat Irizarry is a 64 y.o. female presenting with colon cancer screening.     Past Medical History  Past Medical History:   Diagnosis Date    Acute renal insufficiency 07/12/2023    Breast cancer (Multi)     Disease of thyroid gland     Effusion of left knee 08/08/2023    Other specified postprocedural states     History of Papanicolaou smear    Parkinson's disease (tremor, stiffness, slow motion, unstable posture) (Multi)     Personal history of colonic polyps     Personal history of colonic polyps    Personal history of other diseases of the circulatory system     History of hypertension    Personal history of other endocrine, nutritional and metabolic disease     History of hypothyroidism    Personal history of other endocrine, nutritional and metabolic disease     History of hyperglycemia    Personal history of other malignant neoplasm of skin     History of malignant neoplasm of skin    Personal history of other medical treatment     History of screening mammography     Surgical History  Past Surgical History:   Procedure Laterality Date    APPENDECTOMY      Appendectomy    BREAST LUMPECTOMY Right     CARDIAC SURGERY  2013    REPAIR OF HOLE IN HEART    COLONOSCOPY  11/18/2019    NORMAL; H/O COLONIC POLYPS IN PAST-REPEAT IN 5 YEARS    CT ANGIO CORONARY ART WITH HEARTFLOW IF SCORE >30%  09/16/2021    CT HEART CORONARY ANGIOGRAM 9/16/2021 [facility] ANCILLARY LEGACY    HYSTERECTOMY  04/16/2018    [facility]    INCISIONAL BREAST BIOPSY      Incisional Breast Biopsy    INJECTION Left 08/16/2024    Left Sacroiliac Joint Injection    INJECTION Bilateral 09/27/2024    superior Cluneal nerve block    IR INJECTION EPIDURAL STEROID N/A 03/01/2024    L5-S1    IR INJECTION NERVE BLOCK Bilateral 12/15/2023    Bilat L2/3 and L3/4 MBB    LYMPHADENECTOMY      axillary    OTHER SURGICAL HISTORY  10/02/2018    Bunion Correction By Osborne Procedure    OTHER SURGICAL HISTORY  01/05/2023     Intra-articular corticosteroid injection    OTHER SURGICAL HISTORY  01/21/2022    Epidural steroid injection    OTHER SURGICAL HISTORY  12/08/2021    Radiofrequency ablation    OTHER SURGICAL HISTORY  08/23/2021    Medial branch block    OTHER SURGICAL HISTORY  09/01/2021    Carpal tunnel surgery    OTHER SURGICAL HISTORY  09/23/2021    Medial branch block    OTHER SURGICAL HISTORY  06/17/2022    Epidural steroid injection    OTHER SURGICAL HISTORY  11/17/2022    Epidural steroid injection    RADIOFREQUENCY ABLATION Bilateral 06/28/2024    Bilat L3-5 MB RFA    RADIOFREQUENCY ABLATION Bilateral 10/25/2024    Bilat Superior Cluneal RFA    SACROILIAC JOINT INJECTION Right 04/26/2024    Rt SIJ    TONSILLECTOMY  1968    Tonsillectomy With Adenoidectomy    TUBAL LIGATION  1993    Tubal Ligation     Social History  She reports that she has never smoked. She has never used smokeless tobacco. She reports current alcohol use. She reports that she does not use drugs.    Family History  Family History   Problem Relation Name Age of Onset    Heart disease Mother      Heart failure Father      Hyperthyroidism Sister      Lung cancer Sister      Breast cancer Other GRANDPARENT         Allergies  Allergies   Allergen Reactions    Bee Venom Protein (Honey Bee) Anaphylaxis    Zoledronic Acid Swelling     Review of Systems  Pre-sedation Evaluation:  ASA Classification - ASA 2 - Patient with mild systemic disease with no functional limitations  Mallampati Score - II (hard and soft palate, upper portion of tonsils and uvula visible)    Physical Exam  Vitals and nursing note reviewed.   Constitutional:       Appearance: Normal appearance.   HENT:      Head: Normocephalic.      Mouth/Throat:      Mouth: Mucous membranes are moist.      Pharynx: Oropharynx is clear.   Eyes:      Conjunctiva/sclera: Conjunctivae normal.      Pupils: Pupils are equal, round, and reactive to light.   Cardiovascular:      Rate and Rhythm: Normal rate and  "regular rhythm.      Heart sounds: Normal heart sounds.   Pulmonary:      Effort: Pulmonary effort is normal.      Breath sounds: Normal breath sounds.   Abdominal:      General: Abdomen is flat. Bowel sounds are normal.      Palpations: Abdomen is soft.   Musculoskeletal:      Cervical back: Normal range of motion and neck supple.   Skin:     General: Skin is warm and dry.   Neurological:      General: No focal deficit present.      Mental Status: She is alert and oriented to person, place, and time.   Psychiatric:         Behavior: Behavior normal.          Last Recorded Vitals  Blood pressure 146/78, pulse 77, temperature 36.3 °C (97.3 °F), temperature source Temporal, resp. rate 20, height 1.753 m (5' 9\"), weight 100 kg (220 lb 12.8 oz), SpO2 98%.     Assessment/Plan   Problem List Items Addressed This Visit    None  Visit Diagnoses       Colon cancer screening        Relevant Orders    Colonoscopy Screening; Average Risk Patient               PTA/Current Medications:  (Not in a hospital admission)    Current Outpatient Medications   Medication Sig Dispense Refill    atorvastatin (Lipitor) 40 mg tablet Take 1 tablet (40 mg) by mouth once daily. 90 tablet 3    carbidopa-levodopa (Sinemet)  mg tablet Take 2 tablets by mouth four times daily. 8am,12pm, 6pm and 8pm 720 tablet 3    entacapone (Comtan) 200 mg tablet Take 1 tablet by mouth four times daily. Take with levodopa/carbidopa 120 tablet 11    gabapentin (Neurontin) 300 mg capsule Take 1 capsule (300 mg) by mouth 4 times a day. 120 capsule 2    letrozole (Femara) 2.5 mg tablet Take 1 tablet (2.5 mg total) by mouth once daily. 90 tablet 2    levothyroxine (Synthroid, Levoxyl) 50 mcg tablet Take 1.5 tab(s) on Monday, Wednesday, and Friday and 1 tab daily on all other days 145 tablet 3    aspirin 81 mg chewable tablet Chew 1 tablet (81 mg) once daily.      cyanocobalamin (Vitamin B-12) 100 mcg tablet Take 1 tablet (100 mcg) by mouth once daily.      " ergocalciferol (Vitamin D-2) 1.25 MG (05486 UT) capsule TAKE 1 CAPSULE BY MOUTH 1 TIME PER WEEK. 4 capsule 11    nitroglycerin (Nitrostat) 0.4 mg SL tablet Place 1 tablet (0.4 mg) under the tongue every 5 minutes if needed for chest pain. After the second dose call 911 25 tablet 1     No current facility-administered medications for this encounter.     Cezar Solorzano DO

## 2024-11-23 ENCOUNTER — PHARMACY VISIT (OUTPATIENT)
Dept: PHARMACY | Facility: CLINIC | Age: 64
End: 2024-11-23
Payer: MEDICARE

## 2024-11-23 PROCEDURE — RXMED WILLOW AMBULATORY MEDICATION CHARGE

## 2024-12-02 ENCOUNTER — PHARMACY VISIT (OUTPATIENT)
Dept: PHARMACY | Facility: CLINIC | Age: 64
End: 2024-12-02
Payer: MEDICARE

## 2024-12-02 PROCEDURE — RXMED WILLOW AMBULATORY MEDICATION CHARGE

## 2024-12-19 ENCOUNTER — HOSPITAL ENCOUNTER (OUTPATIENT)
Dept: RADIOLOGY | Facility: CLINIC | Age: 64
Discharge: HOME | End: 2024-12-19
Payer: MEDICARE

## 2024-12-19 ENCOUNTER — HOSPITAL ENCOUNTER (OUTPATIENT)
Dept: RADIOLOGY | Facility: EXTERNAL LOCATION | Age: 64
Discharge: HOME | End: 2024-12-19

## 2024-12-19 ENCOUNTER — CLINICAL SUPPORT (OUTPATIENT)
Dept: ORTHOPEDIC SURGERY | Facility: CLINIC | Age: 64
End: 2024-12-19
Payer: MEDICARE

## 2024-12-19 ENCOUNTER — PHARMACY VISIT (OUTPATIENT)
Dept: PHARMACY | Facility: CLINIC | Age: 64
End: 2024-12-19
Payer: MEDICARE

## 2024-12-19 VITALS — BODY MASS INDEX: 33.82 KG/M2 | WEIGHT: 229 LBS

## 2024-12-19 DIAGNOSIS — M25.562 ACUTE PAIN OF LEFT KNEE: ICD-10-CM

## 2024-12-19 PROCEDURE — RXOTC WILLOW AMBULATORY OTC CHARGE

## 2024-12-19 PROCEDURE — 73564 X-RAY EXAM KNEE 4 OR MORE: CPT | Mod: LT

## 2024-12-19 PROCEDURE — 20611 DRAIN/INJ JOINT/BURSA W/US: CPT | Performed by: SPECIALIST

## 2024-12-19 PROCEDURE — 99214 OFFICE O/P EST MOD 30 MIN: CPT | Performed by: SPECIALIST

## 2024-12-19 PROCEDURE — RXMED WILLOW AMBULATORY MEDICATION CHARGE

## 2024-12-19 PROCEDURE — 73564 X-RAY EXAM KNEE 4 OR MORE: CPT | Mod: LEFT SIDE | Performed by: RADIOLOGY

## 2024-12-19 RX ORDER — TRIAMCINOLONE ACETONIDE 40 MG/ML
2.5 INJECTION, SUSPENSION INTRA-ARTICULAR; INTRAMUSCULAR
Status: COMPLETED | OUTPATIENT
Start: 2024-12-19 | End: 2024-12-19

## 2024-12-19 RX ORDER — DICLOFENAC SODIUM 10 MG/G
GEL TOPICAL
Qty: 100 G | Refills: 1 | COMMUNITY
Start: 2024-12-19

## 2024-12-19 RX ORDER — IBUPROFEN 600 MG/1
600 TABLET ORAL EVERY 8 HOURS PRN
Qty: 28 TABLET | Refills: 1 | Status: SHIPPED | OUTPATIENT
Start: 2024-12-19

## 2024-12-19 ASSESSMENT — PAIN - FUNCTIONAL ASSESSMENT: PAIN_FUNCTIONAL_ASSESSMENT: 0-10

## 2024-12-19 ASSESSMENT — PAIN SCALES - GENERAL: PAINLEVEL_OUTOF10: 8

## 2024-12-19 ASSESSMENT — PAIN DESCRIPTION - DESCRIPTORS: DESCRIPTORS: SHARP

## 2024-12-19 NOTE — ASSESSMENT & PLAN NOTE
64-year-old with Parkinson's underlying.  She felt a pop and then had pain in her left knee.  She states that for several days prior she had felt like her knee was filling with some fluid.    Plan:  Under ultrasound control 30 cc of blood-tinged fluid was aspirated from the knee.  40 mg of Kenalog was injected with a lidocaine suppression test.  She had a good response to this.  Knee immobilizer for weightbearing.  Continue to use the rollator walker.  Elevate the knee.  Icing 20 minutes every several hours.  Follow-up in 4 to 6 weeks for reevaluation with new x-rays of the left knee.

## 2024-12-19 NOTE — PROGRESS NOTES
Assessment/Plan   Encounter Diagnoses:  Acute pain of left knee  Joint effusion of lower extremity  64-year-old with Parkinson's underlying.  She felt a pop and then had pain in her left knee.  She states that for several days prior she had felt like her knee was filling with some fluid.    Plan:  Under ultrasound control 30 cc of blood-tinged fluid was aspirated from the knee.  40 mg of Kenalog was injected with a lidocaine suppression test.  She had a good response to this.  Knee immobilizer for weightbearing.  Continue to use the rollator walker.  Elevate the knee.  Icing 20 minutes every several hours.  Follow-up in 4 to 6 weeks for reevaluation with new x-rays of the left knee.       Subjective    Patient ID: Nusrat Irizarry is a 64 y.o. female.    Chief Complaint: Pain of the Left Knee     Last Surgery: No surgery found  Last Surgery Date: No surgery found    HPI  64-year-old female with underlying Parkinson's states that for the last week to 10 days she was feeling some fullness in the knee with maybe some fluid on the knee.  Yesterday she was trying to stand up from a chair when she felt a pop in her knee.  She did not fall down.  There is no other history of trauma.  She comes in today for an acute evaluation of her left knee.    OBJECTIVE: ORTHO EXAM    Left knee:  Skin healthy and intact  No gross swelling or ecchymosis  Alignment: Slight varus  Effusion: Moderate  ROM: 5 degrees Extension   25 degrees Flexion then pain.  She could do a straight leg raise.  After the lidocaine suppression test she got to 90 degrees flexion.  Minimal crepitance with range of motion  No pain with internal rotation of the hip  Tenderness to palpation: Global moderate especially suprapatellar.  She also had some pain over the patellar tendon and the fat pads.     No laxity to valgus stress  No laxity to varus stress with guarding on the stress test.  Negative Lachman´s test  Negative posterior drawer test  Mild pain with  Cristino´s test     Neurovascular exam normal distally  2+ DP pulse and good cap refill    IMAGE RESULTS:  Point of Care Ultrasound  These images are not reportable by radiology and will not be interpreted   by  Radiologists.    X-rays show no evidence of bony fracture or abnormality.  She has some Red changes but preserved joint spaces.  ULTRASOUND  DIAGNOSTIC ULTRASOUND REPORT FINAL: Left KNEE  Sonographer: Trey Banks MD  Indication: Knee Pain  Procedure: Ultrasound, extremity, nonvascular, real-time, COMPLETE, anatomic specific  Technique: B-Mode Ultrasound Examination performed using 6- 9 MHz linear transducer with [employer] Software  STUDY TYPE:   1. ULTRASOUND EXTREMITY  2. REAL TIME WITH IMAGE DOCUMENTATION  3. NON-VASCULAR  4. COMPLETE STUDY, INCLUDING BUT NOT LIMITED TO MUSCLE, TENDONS, LIGAMENTS, SOFT TISSUES, ADIPOSE TISSUE AND SUBCUTANEOUS TISSUE.  Site: KNEE   Live ultrasound was performed with of patient's  KNEE and PERMANENTLY documented. I personally performed the ultrasound and reviewed the findings. These show:    Ayde-articular evaluation:   An intact Quadriceps Tendon with the Quadriceps Muscle fibers showing normal striations Quadriceps Tendon demonstrating normal fibrillar pattern. . The Patellar Tendon demonstrates normal fibrillar pattern and is intact.   No significant soft tissue fluid collection/abscess appreciated.     Joint Evaluation:  The lateral joint line shows an intact LCL.   Medial joint line exam shows an intact MCL.   The patellar tendon was within normal limits.  Moderate joint effusion noted.  With some features that suggest blood.     The patient tolerated the procedure well.        L Inj/Asp on 12/19/2024 10:50 AM  Medications: 2.5 mg triamcinolone acetonide 40 mg/mL  Aspirate: 30 mL blood-tinged and serous  Outcome: tolerated well, no immediate complications           Orders Placed This Encounter    XR knee left 4+ views    Point of Care Ultrasound

## 2024-12-22 PROCEDURE — RXMED WILLOW AMBULATORY MEDICATION CHARGE

## 2024-12-23 ENCOUNTER — PHARMACY VISIT (OUTPATIENT)
Dept: PHARMACY | Facility: CLINIC | Age: 64
End: 2024-12-23
Payer: COMMERCIAL

## 2024-12-23 DIAGNOSIS — M17.0 PRIMARY OSTEOARTHRITIS OF BOTH KNEES: ICD-10-CM

## 2025-01-07 DIAGNOSIS — K59.01 SLOW TRANSIT CONSTIPATION: ICD-10-CM

## 2025-01-07 PROCEDURE — RXMED WILLOW AMBULATORY MEDICATION CHARGE

## 2025-01-07 RX ORDER — LACTULOSE 10 G/15ML
SOLUTION ORAL NIGHTLY
Qty: 473 ML | Refills: 3 | Status: SHIPPED | OUTPATIENT
Start: 2025-01-07

## 2025-01-08 ENCOUNTER — PHARMACY VISIT (OUTPATIENT)
Dept: PHARMACY | Facility: CLINIC | Age: 65
End: 2025-01-08
Payer: MEDICARE

## 2025-01-23 ENCOUNTER — HOSPITAL ENCOUNTER (OUTPATIENT)
Dept: RADIOLOGY | Facility: CLINIC | Age: 65
Discharge: HOME | End: 2025-01-23
Payer: MEDICARE

## 2025-01-23 ENCOUNTER — HOSPITAL ENCOUNTER (OUTPATIENT)
Dept: RADIOLOGY | Facility: EXTERNAL LOCATION | Age: 65
Discharge: HOME | End: 2025-01-23

## 2025-01-23 ENCOUNTER — APPOINTMENT (OUTPATIENT)
Dept: ORTHOPEDIC SURGERY | Facility: CLINIC | Age: 65
End: 2025-01-23
Payer: MEDICARE

## 2025-01-23 DIAGNOSIS — M17.0 PRIMARY OSTEOARTHRITIS OF BOTH KNEES: ICD-10-CM

## 2025-01-23 DIAGNOSIS — R20.0 NUMBNESS AND TINGLING IN BOTH HANDS: ICD-10-CM

## 2025-01-23 DIAGNOSIS — R20.2 NUMBNESS AND TINGLING IN BOTH HANDS: ICD-10-CM

## 2025-01-23 DIAGNOSIS — G62.9 NEUROPATHY: ICD-10-CM

## 2025-01-23 PROCEDURE — 1159F MED LIST DOCD IN RCRD: CPT | Performed by: SPECIALIST

## 2025-01-23 PROCEDURE — 99214 OFFICE O/P EST MOD 30 MIN: CPT | Performed by: SPECIALIST

## 2025-01-23 PROCEDURE — 73564 X-RAY EXAM KNEE 4 OR MORE: CPT | Mod: LEFT SIDE | Performed by: RADIOLOGY

## 2025-01-23 PROCEDURE — 73564 X-RAY EXAM KNEE 4 OR MORE: CPT | Mod: LT

## 2025-01-23 PROCEDURE — RXMED WILLOW AMBULATORY MEDICATION CHARGE

## 2025-01-23 PROCEDURE — 76882 US LMTD JT/FCL EVL NVASC XTR: CPT | Performed by: SPECIALIST

## 2025-01-23 PROCEDURE — 1160F RVW MEDS BY RX/DR IN RCRD: CPT | Performed by: SPECIALIST

## 2025-01-23 PROCEDURE — 1036F TOBACCO NON-USER: CPT | Performed by: SPECIALIST

## 2025-01-23 RX ORDER — GABAPENTIN 300 MG/1
300 CAPSULE ORAL 4 TIMES DAILY
Qty: 120 CAPSULE | Refills: 2 | Status: SHIPPED | OUTPATIENT
Start: 2025-01-23

## 2025-01-23 ASSESSMENT — PAIN - FUNCTIONAL ASSESSMENT: PAIN_FUNCTIONAL_ASSESSMENT: NO/DENIES PAIN

## 2025-01-23 NOTE — ASSESSMENT & PLAN NOTE
Assessment: Left knee pain secondary to OA which responded well to cortisone injection.  She states she is greater than 90% improved.    Plan:  Continue the home exercise program.  Continue as needed pain meds Tylenol versus nonsteroidal anti-inflammatories if permitted.  Follow-up in 3 months for reevaluation.  She has underlying Parkinson's which aggravates her knee and her rehab.

## 2025-01-23 NOTE — PROGRESS NOTES
Assessment/Plan   Encounter Diagnoses:  Primary osteoarthritis of both knees  Left knee pain  Assessment: Left knee pain secondary to OA which responded well to cortisone injection.  She states she is greater than 90% improved.    Plan:  Continue the home exercise program.  Continue as needed pain meds Tylenol versus nonsteroidal anti-inflammatories if permitted.  Follow-up in 3 months for reevaluation.  She has underlying Parkinson's which aggravates her knee and her rehab.       Subjective    Patient ID: Nusrat Irizarry is a 64 y.o. female.    Chief Complaint: Follow-up of the Left Knee (INJ 12-19-24 LAN/X-RAYS 1-23-25/)     Last Surgery: No surgery found  Last Surgery Date: No surgery found    HPI  64-year-old female with underlying Parkinson's states that for the last week to 10 days she was feeling some fullness in the knee with maybe some fluid on the knee.  Yesterday she was trying to stand up from a chair when she felt a pop in her knee.  She did not fall down.  There is no other history of trauma.  She comes in today for an acute evaluation of her left knee.    1/23/25-90% or greater improvement with the cortisone injection and knee aspiration.    OBJECTIVE: ORTHO EXAM    Left knee:  Skin healthy and intact  No gross swelling or ecchymosis  Alignment: Slight varus  Effusion: Scant   ROM: 0 degrees Extension   130 degrees Flexion then pain.  She could do a straight leg raise.    Minimal crepitance with range of motion  No pain with internal rotation of the hip  Tenderness to palpation: Relatively nontender this a.m.  No laxity to valgus stress  No laxity to varus stress with guarding on the stress test.  Negative Lachman´s test  Negative posterior drawer test  Minimal pain with Cristino´s test     Neurovascular exam normal distally  2+ DP pulse and good cap refill    IMAGE RESULTS:  Point of Care Ultrasound  These images are not reportable by radiology and will not be interpreted   by   Radiologists.    X-rays show no evidence of bony fracture or abnormality.  She has some Red changes but preserved joint spaces.  ULTRASOUND  DIAGNOSTIC ULTRASOUND REPORT FINAL: Left KNEE  Sonographer: Trey Banks MD  Indication: Knee Pain  Procedure: Ultrasound, extremity, nonvascular, real-time, COMPLETE, anatomic specific  Technique: B-Mode Ultrasound Examination performed using 6- 9 MHz linear transducer with [employer]  STUDY TYPE:   1. ULTRASOUND EXTREMITY  2. REAL TIME WITH IMAGE DOCUMENTATION  3. NON-VASCULAR  4. COMPLETE STUDY, INCLUDING BUT NOT LIMITED TO MUSCLE, TENDONS, LIGAMENTS, SOFT TISSUES, ADIPOSE TISSUE AND SUBCUTANEOUS TISSUE.  Site: KNEE   Live ultrasound was performed with of patient's  KNEE and PERMANENTLY documented. I personally performed the ultrasound and reviewed the findings. These show:    Ayde-articular evaluation:   An intact Quadriceps Tendon with the Quadriceps Muscle fibers showing normal striations Quadriceps Tendon demonstrating normal fibrillar pattern. . The Patellar Tendon demonstrates normal fibrillar pattern and is intact.   No significant soft tissue fluid collection/abscess appreciated.     Joint Evaluation:  The lateral joint line shows an intact LCL.   Medial joint line exam shows an intact MCL.   The patellar tendon was within normal limits.  Minimal joint effusion noted.     The patient tolerated the procedure well.        Procedures     Orders Placed This Encounter    Point of Care Ultrasound

## 2025-01-25 ENCOUNTER — PHARMACY VISIT (OUTPATIENT)
Dept: PHARMACY | Facility: CLINIC | Age: 65
End: 2025-01-25
Payer: MEDICARE

## 2025-01-25 PROCEDURE — RXMED WILLOW AMBULATORY MEDICATION CHARGE

## 2025-01-26 PROCEDURE — RXMED WILLOW AMBULATORY MEDICATION CHARGE

## 2025-01-27 ENCOUNTER — PHARMACY VISIT (OUTPATIENT)
Dept: PHARMACY | Facility: CLINIC | Age: 65
End: 2025-01-27
Payer: MEDICARE

## 2025-02-10 ENCOUNTER — OFFICE VISIT (OUTPATIENT)
Dept: PAIN MEDICINE | Facility: CLINIC | Age: 65
End: 2025-02-10
Payer: MEDICARE

## 2025-02-10 VITALS
SYSTOLIC BLOOD PRESSURE: 113 MMHG | HEART RATE: 80 BPM | RESPIRATION RATE: 16 BRPM | BODY MASS INDEX: 33.97 KG/M2 | DIASTOLIC BLOOD PRESSURE: 79 MMHG | WEIGHT: 230 LBS

## 2025-02-10 DIAGNOSIS — M47.816 LUMBAR ARTHROPATHY: Primary | ICD-10-CM

## 2025-02-10 DIAGNOSIS — M53.3 CHRONIC SI JOINT PAIN: ICD-10-CM

## 2025-02-10 DIAGNOSIS — G89.29 CHRONIC SI JOINT PAIN: ICD-10-CM

## 2025-02-10 DIAGNOSIS — G58.8 CLUNEAL NEUROPATHY: ICD-10-CM

## 2025-02-10 DIAGNOSIS — M48.062 SPINAL STENOSIS, LUMBAR REGION WITH NEUROGENIC CLAUDICATION: ICD-10-CM

## 2025-02-10 PROCEDURE — 99213 OFFICE O/P EST LOW 20 MIN: CPT

## 2025-02-10 RX ORDER — LIDOCAINE HYDROCHLORIDE 20 MG/ML
10 INJECTION, SOLUTION EPIDURAL; INFILTRATION; INTRACAUDAL; PERINEURAL ONCE
OUTPATIENT
Start: 2025-02-10 | End: 2025-02-10

## 2025-02-10 ASSESSMENT — ENCOUNTER SYMPTOMS
LIGHT-HEADEDNESS: 0
ENDOCRINE NEGATIVE: 1
SHORTNESS OF BREATH: 0
WHEEZING: 0
WEAKNESS: 0
MYALGIAS: 1
DYSPHORIC MOOD: 0
FACIAL ASYMMETRY: 0
ADENOPATHY: 0
BACK PAIN: 1
PALPITATIONS: 0
ARTHRALGIAS: 0
EYES NEGATIVE: 1
ALLERGIC/IMMUNOLOGIC NEGATIVE: 1
COUGH: 0
CONSTITUTIONAL NEGATIVE: 1
BRUISES/BLEEDS EASILY: 0

## 2025-02-10 ASSESSMENT — PATIENT HEALTH QUESTIONNAIRE - PHQ9
1. LITTLE INTEREST OR PLEASURE IN DOING THINGS: NOT AT ALL
2. FEELING DOWN, DEPRESSED OR HOPELESS: NOT AT ALL
SUM OF ALL RESPONSES TO PHQ9 QUESTIONS 1 AND 2: 0

## 2025-02-10 NOTE — H&P (VIEW-ONLY)
Subjective   Patient ID: Nusrat Iirzarry is a 64 y.o. female who presents for Follow-up (FUV 3 months. Today reports having pain in her bilat lower back R>L and bilat hips rates 3/10 now and 7/10 standing, loading , laying in bed she has to adjust a lot, due to hip pain R>L,  when she lifts her rt leg up she feels it pulling in her back. She denies any numbness or tingling. Tylenol,  ibuprofen, gabapentin 300 mg 4 times a day from her PCP for neuropathy pain she tried the cream given to her at her last visit but it did not help much, she is walking with a cane. ) ELIZABETH score  66%,  COMM  2,  screenings: depression,  Falls,  smoking  all negative.   Mel Reddy RN 02/10/25 10:51 AM     The patient is a 64-year-old female presents today for a 3-month follow-up appointment.  She currently rates her pain a 3/10, says it does get up to 7/10 at its worst.  The pain is across her lower back right side worse than the left, and will radiate down his lateral aspect of bilateral hips.  The pain is worse with standing, loading the , and other activities of daily living.  The pain interferes with her ability to function on a daily basis.  On 6/28/2024 she had a bilateral medial branch RFA covering L4-S1 facet joints.  She obtained significant relief from this injection that lasted for at least 6 months before the pain began to return.  She is open to any options we have to help with the pain.        Review of Systems   Constitutional: Negative.    HENT: Negative.     Eyes: Negative.    Respiratory:  Negative for cough, shortness of breath and wheezing.    Cardiovascular:  Negative for chest pain, palpitations and leg swelling.   Endocrine: Negative.    Genitourinary: Negative.    Musculoskeletal:  Positive for back pain and myalgias. Negative for arthralgias.   Skin: Negative.    Allergic/Immunologic: Negative.    Neurological:  Negative for facial asymmetry, weakness and light-headedness.   Hematological:   Negative for adenopathy. Does not bruise/bleed easily.   Psychiatric/Behavioral:  Negative for dysphoric mood and suicidal ideas.        Objective   Physical Exam  Constitutional:       General: She is not in acute distress.     Appearance: Normal appearance.   HENT:      Head: Normocephalic.      Mouth/Throat:      Mouth: Mucous membranes are moist.   Eyes:      Extraocular Movements: Extraocular movements intact.   Cardiovascular:      Rate and Rhythm: Normal rate and regular rhythm.      Pulses: Normal pulses.      Heart sounds: Normal heart sounds. No murmur heard.     No friction rub. No gallop.   Pulmonary:      Effort: Pulmonary effort is normal.      Breath sounds: Normal breath sounds. No wheezing, rhonchi or rales.   Abdominal:      General: Abdomen is flat.      Palpations: Abdomen is soft.   Musculoskeletal:      Cervical back: Normal range of motion.      Right lower leg: No edema.      Left lower leg: No edema.      Comments: Ambulates with a cane  Strength 5/5 BLE  Bilateral lumbar paraspinal TTP  Facet loading positive   Lymphadenopathy:      Cervical: No cervical adenopathy.   Skin:     General: Skin is warm and dry.   Neurological:      General: No focal deficit present.      Mental Status: She is alert and oriented to person, place, and time. Mental status is at baseline.   Psychiatric:         Mood and Affect: Mood normal.         Behavior: Behavior normal.         Assessment/Plan   Diagnoses and all orders for this visit:  Lumbar arthropathy  -     FL pain management; Future  -     Radiofrequency Ablation; Future  Spinal stenosis, lumbar region with neurogenic claudication  Chronic SI joint pain  Cluneal neuropathy  Other orders  -     lidocaine PF (Xylocaine) 20 mg/mL (2 %) injection 200 mg  -     lidocaine PF (Xylocaine) 20 mg/mL (2 %) injection 200 mg  -     NPO Diet Except: Sips with meds; Effective now; Standing  -     Height and weight; Standing  -     Type And Screen; Standing  -      Inpatient consult to Respiratory Care; Standing  -     Adult diet Regular; Standing  -     Vital Signs; Standing  -     Notify provider (specify parameters); Standing  -     Continue IV fluids ordered pre-procedure; Standing  -     Prior to Discharge O2 Weaning; Standing  -     Pulse oximetry, continuous; Standing  -     Discharge patient; Standing       The patient is a 64-year-old female with a past medical history significant for the above-mentioned problems.  We reviewed her imaging and discussed options based on findings as well follows her pain pattern, and her physical exam.  Given that she previously obtained significant relief greater than 6 months from the lumbar RFA, and without this is the pain that appears to have returned, I would recommend repeating the bilateral medial branch radiofrequency ablation covering the L4-S1 facet joints under fluoroscopy for therapeutic purposes.  The procedure was discussed, risks and benefits were discussed, patient is agreeable.  Med holds include aspirin for 6 days, ibuprofen for 1 day, vitamins/supplements for 1 week.  She will follow-up after the injection for reevaluation, call the clinic sooner if needed.

## 2025-02-10 NOTE — PROGRESS NOTES
Subjective   Patient ID: Nusrat Irizarry is a 64 y.o. female who presents for Follow-up (FUV 3 months. Today reports having pain in her bilat lower back R>L and bilat hips rates 3/10 now and 7/10 standing, loading , laying in bed she has to adjust a lot, due to hip pain R>L,  when she lifts her rt leg up she feels it pulling in her back. She denies any numbness or tingling. Tylenol,  ibuprofen, gabapentin 300 mg 4 times a day from her PCP for neuropathy pain she tried the cream given to her at her last visit but it did not help much, she is walking with a cane. ) ELIZABETH score  66%,  COMM  2,  screenings: depression,  Falls,  smoking  all negative.   Mle Reddy RN 02/10/25 10:51 AM     The patient is a 64-year-old female presents today for a 3-month follow-up appointment.  She currently rates her pain a 3/10, says it does get up to 7/10 at its worst.  The pain is across her lower back right side worse than the left, and will radiate down his lateral aspect of bilateral hips.  The pain is worse with standing, loading the , and other activities of daily living.  The pain interferes with her ability to function on a daily basis.  On 6/28/2024 she had a bilateral medial branch RFA covering L4-S1 facet joints.  She obtained significant relief from this injection that lasted for at least 6 months before the pain began to return.  She is open to any options we have to help with the pain.        Review of Systems   Constitutional: Negative.    HENT: Negative.     Eyes: Negative.    Respiratory:  Negative for cough, shortness of breath and wheezing.    Cardiovascular:  Negative for chest pain, palpitations and leg swelling.   Endocrine: Negative.    Genitourinary: Negative.    Musculoskeletal:  Positive for back pain and myalgias. Negative for arthralgias.   Skin: Negative.    Allergic/Immunologic: Negative.    Neurological:  Negative for facial asymmetry, weakness and light-headedness.   Hematological:   Negative for adenopathy. Does not bruise/bleed easily.   Psychiatric/Behavioral:  Negative for dysphoric mood and suicidal ideas.        Objective   Physical Exam  Constitutional:       General: She is not in acute distress.     Appearance: Normal appearance.   HENT:      Head: Normocephalic.      Mouth/Throat:      Mouth: Mucous membranes are moist.   Eyes:      Extraocular Movements: Extraocular movements intact.   Cardiovascular:      Rate and Rhythm: Normal rate and regular rhythm.      Pulses: Normal pulses.      Heart sounds: Normal heart sounds. No murmur heard.     No friction rub. No gallop.   Pulmonary:      Effort: Pulmonary effort is normal.      Breath sounds: Normal breath sounds. No wheezing, rhonchi or rales.   Abdominal:      General: Abdomen is flat.      Palpations: Abdomen is soft.   Musculoskeletal:      Cervical back: Normal range of motion.      Right lower leg: No edema.      Left lower leg: No edema.      Comments: Ambulates with a cane  Strength 5/5 BLE  Bilateral lumbar paraspinal TTP  Facet loading positive   Lymphadenopathy:      Cervical: No cervical adenopathy.   Skin:     General: Skin is warm and dry.   Neurological:      General: No focal deficit present.      Mental Status: She is alert and oriented to person, place, and time. Mental status is at baseline.   Psychiatric:         Mood and Affect: Mood normal.         Behavior: Behavior normal.         Assessment/Plan   Diagnoses and all orders for this visit:  Lumbar arthropathy  -     FL pain management; Future  -     Radiofrequency Ablation; Future  Spinal stenosis, lumbar region with neurogenic claudication  Chronic SI joint pain  Cluneal neuropathy  Other orders  -     lidocaine PF (Xylocaine) 20 mg/mL (2 %) injection 200 mg  -     lidocaine PF (Xylocaine) 20 mg/mL (2 %) injection 200 mg  -     NPO Diet Except: Sips with meds; Effective now; Standing  -     Height and weight; Standing  -     Type And Screen; Standing  -      Inpatient consult to Respiratory Care; Standing  -     Adult diet Regular; Standing  -     Vital Signs; Standing  -     Notify provider (specify parameters); Standing  -     Continue IV fluids ordered pre-procedure; Standing  -     Prior to Discharge O2 Weaning; Standing  -     Pulse oximetry, continuous; Standing  -     Discharge patient; Standing       The patient is a 64-year-old female with a past medical history significant for the above-mentioned problems.  We reviewed her imaging and discussed options based on findings as well follows her pain pattern, and her physical exam.  Given that she previously obtained significant relief greater than 6 months from the lumbar RFA, and without this is the pain that appears to have returned, I would recommend repeating the bilateral medial branch radiofrequency ablation covering the L4-S1 facet joints under fluoroscopy for therapeutic purposes.  The procedure was discussed, risks and benefits were discussed, patient is agreeable.  Med holds include aspirin for 6 days, ibuprofen for 1 day, vitamins/supplements for 1 week.  She will follow-up after the injection for reevaluation, call the clinic sooner if needed.

## 2025-02-12 ENCOUNTER — TELEPHONE (OUTPATIENT)
Dept: PAIN MEDICINE | Facility: CLINIC | Age: 65
End: 2025-02-12
Payer: MEDICARE

## 2025-02-17 ENCOUNTER — APPOINTMENT (OUTPATIENT)
Dept: PAIN MEDICINE | Facility: CLINIC | Age: 65
End: 2025-02-17
Payer: MEDICARE

## 2025-02-17 PROCEDURE — RXMED WILLOW AMBULATORY MEDICATION CHARGE

## 2025-02-19 ENCOUNTER — PHARMACY VISIT (OUTPATIENT)
Dept: PHARMACY | Facility: CLINIC | Age: 65
End: 2025-02-19
Payer: MEDICARE

## 2025-02-19 PROCEDURE — RXMED WILLOW AMBULATORY MEDICATION CHARGE

## 2025-02-27 ENCOUNTER — PHARMACY VISIT (OUTPATIENT)
Dept: PHARMACY | Facility: CLINIC | Age: 65
End: 2025-02-27
Payer: MEDICARE

## 2025-02-27 DIAGNOSIS — E78.00 HYPERCHOLESTEREMIA: ICD-10-CM

## 2025-02-27 PROCEDURE — RXMED WILLOW AMBULATORY MEDICATION CHARGE

## 2025-02-27 RX ORDER — ATORVASTATIN CALCIUM 40 MG/1
40 TABLET, FILM COATED ORAL DAILY
Qty: 30 TABLET | Refills: 0 | Status: SHIPPED | OUTPATIENT
Start: 2025-02-27

## 2025-02-28 ENCOUNTER — PHARMACY VISIT (OUTPATIENT)
Dept: PHARMACY | Facility: CLINIC | Age: 65
End: 2025-02-28

## 2025-03-11 ENCOUNTER — APPOINTMENT (OUTPATIENT)
Dept: CARDIOLOGY | Facility: CLINIC | Age: 65
End: 2025-03-11
Payer: MEDICARE

## 2025-03-11 VITALS
OXYGEN SATURATION: 96 % | SYSTOLIC BLOOD PRESSURE: 104 MMHG | WEIGHT: 233.6 LBS | DIASTOLIC BLOOD PRESSURE: 60 MMHG | BODY MASS INDEX: 34.6 KG/M2 | HEART RATE: 83 BPM | HEIGHT: 69 IN

## 2025-03-11 DIAGNOSIS — E78.00 HYPERCHOLESTEREMIA: ICD-10-CM

## 2025-03-11 DIAGNOSIS — I31.9 PERICARDITIS, UNSPECIFIED CHRONICITY, UNSPECIFIED TYPE (HHS-HCC): ICD-10-CM

## 2025-03-11 DIAGNOSIS — I34.1 MITRAL VALVE PROLAPSE: ICD-10-CM

## 2025-03-11 DIAGNOSIS — I31.39 PERICARDIAL EFFUSION (HHS-HCC): ICD-10-CM

## 2025-03-11 DIAGNOSIS — Q21.11 SECUNDUM ASD (HHS-HCC): Primary | ICD-10-CM

## 2025-03-11 DIAGNOSIS — I95.1 ORTHOSTATIC HYPOTENSION: ICD-10-CM

## 2025-03-11 DIAGNOSIS — R42 DIZZINESS: ICD-10-CM

## 2025-03-11 DIAGNOSIS — Q21.11: ICD-10-CM

## 2025-03-11 DIAGNOSIS — I10 BENIGN ESSENTIAL HYPERTENSION: ICD-10-CM

## 2025-03-11 LAB
25(OH)D3+25(OH)D2 SERPL-MCNC: 95 NG/ML (ref 30–100)
ALBUMIN SERPL-MCNC: 4.3 G/DL (ref 3.6–5.1)
ALP SERPL-CCNC: 70 U/L (ref 37–153)
ALT SERPL-CCNC: 6 U/L (ref 6–29)
ANION GAP SERPL CALCULATED.4IONS-SCNC: 12 MMOL/L (CALC) (ref 7–17)
AST SERPL-CCNC: 11 U/L (ref 10–35)
BASOPHILS # BLD AUTO: 22 CELLS/UL (ref 0–200)
BASOPHILS NFR BLD AUTO: 0.4 %
BILIRUB SERPL-MCNC: 0.6 MG/DL (ref 0.2–1.2)
BUN SERPL-MCNC: 20 MG/DL (ref 7–25)
CALCIUM SERPL-MCNC: 8.7 MG/DL (ref 8.6–10.4)
CHLORIDE SERPL-SCNC: 106 MMOL/L (ref 98–110)
CO2 SERPL-SCNC: 23 MMOL/L (ref 20–32)
CREAT SERPL-MCNC: 0.96 MG/DL (ref 0.5–1.05)
EGFRCR SERPLBLD CKD-EPI 2021: 65 ML/MIN/1.73M2
EOSINOPHIL # BLD AUTO: 61 CELLS/UL (ref 15–500)
EOSINOPHIL NFR BLD AUTO: 1.1 %
ERYTHROCYTE [DISTWIDTH] IN BLOOD BY AUTOMATED COUNT: 12.4 % (ref 11–15)
FERRITIN SERPL-MCNC: 126 NG/ML (ref 16–288)
GLUCOSE SERPL-MCNC: 99 MG/DL (ref 65–99)
HCT VFR BLD AUTO: 37.9 % (ref 35–45)
HGB BLD-MCNC: 12.4 G/DL (ref 11.7–15.5)
IRON SATN MFR SERPL: 39 % (CALC) (ref 16–45)
IRON SERPL-MCNC: 99 MCG/DL (ref 45–160)
LYMPHOCYTES # BLD AUTO: 1287 CELLS/UL (ref 850–3900)
LYMPHOCYTES NFR BLD AUTO: 23.4 %
MAGNESIUM SERPL-MCNC: 2 MG/DL (ref 1.5–2.5)
MCH RBC QN AUTO: 29.8 PG (ref 27–33)
MCHC RBC AUTO-ENTMCNC: 32.7 G/DL (ref 32–36)
MCV RBC AUTO: 91.1 FL (ref 80–100)
MONOCYTES # BLD AUTO: 468 CELLS/UL (ref 200–950)
MONOCYTES NFR BLD AUTO: 8.5 %
NEUTROPHILS # BLD AUTO: 3663 CELLS/UL (ref 1500–7800)
NEUTROPHILS NFR BLD AUTO: 66.6 %
PLATELET # BLD AUTO: 241 THOUSAND/UL (ref 140–400)
PMV BLD REES-ECKER: 11.1 FL (ref 7.5–12.5)
POTASSIUM SERPL-SCNC: 4.3 MMOL/L (ref 3.5–5.3)
PROT SERPL-MCNC: 6.4 G/DL (ref 6.1–8.1)
RBC # BLD AUTO: 4.16 MILLION/UL (ref 3.8–5.1)
SODIUM SERPL-SCNC: 141 MMOL/L (ref 135–146)
T4 FREE SERPL-MCNC: 1.2 NG/DL (ref 0.8–1.8)
TIBC SERPL-MCNC: 256 MCG/DL (CALC) (ref 250–450)
TSH SERPL-ACNC: 2.1 MIU/L (ref 0.4–4.5)
VIT B12 SERPL-MCNC: 530 PG/ML (ref 200–1100)
WBC # BLD AUTO: 5.5 THOUSAND/UL (ref 3.8–10.8)

## 2025-03-11 PROCEDURE — 3074F SYST BP LT 130 MM HG: CPT | Performed by: STUDENT IN AN ORGANIZED HEALTH CARE EDUCATION/TRAINING PROGRAM

## 2025-03-11 PROCEDURE — 99214 OFFICE O/P EST MOD 30 MIN: CPT | Performed by: STUDENT IN AN ORGANIZED HEALTH CARE EDUCATION/TRAINING PROGRAM

## 2025-03-11 PROCEDURE — 3078F DIAST BP <80 MM HG: CPT | Performed by: STUDENT IN AN ORGANIZED HEALTH CARE EDUCATION/TRAINING PROGRAM

## 2025-03-11 PROCEDURE — 1159F MED LIST DOCD IN RCRD: CPT | Performed by: STUDENT IN AN ORGANIZED HEALTH CARE EDUCATION/TRAINING PROGRAM

## 2025-03-11 PROCEDURE — 93000 ELECTROCARDIOGRAM COMPLETE: CPT | Performed by: STUDENT IN AN ORGANIZED HEALTH CARE EDUCATION/TRAINING PROGRAM

## 2025-03-11 PROCEDURE — 1160F RVW MEDS BY RX/DR IN RCRD: CPT | Performed by: STUDENT IN AN ORGANIZED HEALTH CARE EDUCATION/TRAINING PROGRAM

## 2025-03-11 PROCEDURE — 3008F BODY MASS INDEX DOCD: CPT | Performed by: STUDENT IN AN ORGANIZED HEALTH CARE EDUCATION/TRAINING PROGRAM

## 2025-03-11 PROCEDURE — 1036F TOBACCO NON-USER: CPT | Performed by: STUDENT IN AN ORGANIZED HEALTH CARE EDUCATION/TRAINING PROGRAM

## 2025-03-11 RX ORDER — ATORVASTATIN CALCIUM 40 MG/1
40 TABLET, FILM COATED ORAL DAILY
Qty: 30 TABLET | Refills: 0 | Status: SHIPPED | OUTPATIENT
Start: 2025-03-11

## 2025-03-11 NOTE — PROGRESS NOTES
Chief Complaint   Patient presents with    1 year f/u     Dr. Tenorio pt     HPI:  I was requested by Dr. Ibarra to evaluate this patient in consultation for cardiac assessment.    Mrs. Nusrat Irizarry is a 64 y.o. year old non-smoker female  with past medical history significant for ostium secundum ASD status post closure with 35 mm Helex device March 2013, hypertension, hyperlipidemia, hypothyroidism, Parkinson's disease, breast cancer. Prior history of cardiotoxic chemotherapy. Patient received anthracycline / cyclophosphamide x2 cycles in April/May 2020 with multiple complications and did not finish her chemotherapy. Patient received radiation 7/14/2020-8/24/2020. Also prior history of pericardial effusion, and no effusion noted on last echocardiogram dated 3/5/2021.     Patient complaining of fatigue on exertion that has been chronic. She also complains of some atypical chest pain, not related to exercise, woese while laying down / sleeping, improves with nitroglycerin. She also notices some dizziness with exertion. Denies any exertional angina or shortness of breath. Denies any orthopnea/PND/lower extremity edema. Denies any syncopal events.     Past Medical History  Past Medical History:   Diagnosis Date    Acute renal insufficiency 07/12/2023    Breast cancer (Multi)     Disease of thyroid gland     Effusion of left knee 08/08/2023    Other specified postprocedural states     History of Papanicolaou smear    Parkinson's disease (tremor, stiffness, slow motion, unstable posture) (Multi)     Personal history of colonic polyps     Personal history of colonic polyps    Personal history of other diseases of the circulatory system     History of hypertension    Personal history of other endocrine, nutritional and metabolic disease     History of hypothyroidism    Personal history of other endocrine, nutritional and metabolic disease     History of hyperglycemia    Personal history of other malignant neoplasm  of skin     History of malignant neoplasm of skin    Personal history of other medical treatment     History of screening mammography       Past Surgical History  Past Surgical History:   Procedure Laterality Date    APPENDECTOMY      Appendectomy    BREAST LUMPECTOMY Right     CARDIAC SURGERY  2013    REPAIR OF HOLE IN HEART    COLONOSCOPY  11/18/2019    NORMAL; H/O COLONIC POLYPS IN PAST-REPEAT IN 5 YEARS    CT ANGIO CORONARY ART WITH HEARTFLOW IF SCORE >30%  09/16/2021    CT HEART CORONARY ANGIOGRAM 9/16/2021 [facility] ANCILLARY LEGACY    HYSTERECTOMY  04/16/2018    [facility]    INCISIONAL BREAST BIOPSY      Incisional Breast Biopsy    INJECTION Left 08/16/2024    Left Sacroiliac Joint Injection    INJECTION Bilateral 09/27/2024    superior Cluneal nerve block    IR INJECTION EPIDURAL STEROID N/A 03/01/2024    L5-S1    IR INJECTION NERVE BLOCK Bilateral 12/15/2023    Bilat L2/3 and L3/4 MBB    LYMPHADENECTOMY      axillary    OTHER SURGICAL HISTORY  10/02/2018    Bunion Correction By Osborne Procedure    OTHER SURGICAL HISTORY  01/05/2023    Intra-articular corticosteroid injection    OTHER SURGICAL HISTORY  01/21/2022    Epidural steroid injection    OTHER SURGICAL HISTORY  12/08/2021    Radiofrequency ablation    OTHER SURGICAL HISTORY  08/23/2021    Medial branch block    OTHER SURGICAL HISTORY  09/01/2021    Carpal tunnel surgery    OTHER SURGICAL HISTORY  09/23/2021    Medial branch block    OTHER SURGICAL HISTORY  06/17/2022    Epidural steroid injection    OTHER SURGICAL HISTORY  11/17/2022    Epidural steroid injection    RADIOFREQUENCY ABLATION Bilateral 06/28/2024    Bilat L3-5 MB RFA    RADIOFREQUENCY ABLATION Bilateral 10/25/2024    Bilat Superior Cluneal RFA    SACROILIAC JOINT INJECTION Right 04/26/2024    Rt SIJ    TONSILLECTOMY  1968    Tonsillectomy With Adenoidectomy    TUBAL LIGATION  1993    Tubal Ligation       Past Family History  Family History   Problem Relation Name Age of Onset  "   Heart disease Mother      Heart failure Father      Hyperthyroidism Sister      Lung cancer Sister      Breast cancer Other GRANDPARENT        Allergy History  Allergies   Allergen Reactions    Bee Venom Protein (Honey Bee) Anaphylaxis    Zoledronic Acid Swelling       Past Social History  Social History     Socioeconomic History    Marital status:    Tobacco Use    Smoking status: Never    Smokeless tobacco: Never   Vaping Use    Vaping status: Never Used   Substance and Sexual Activity    Alcohol use: Yes     Comment: occasional    Drug use: Never    Sexual activity: Defer     Social Drivers of Health     Financial Resource Strain: Low Risk  (5/18/2020)    Received from [facility]    Overall Financial Resource Strain (CARDIA)     Difficulty of Paying Living Expenses: Not hard at all   Food Insecurity: No Food Insecurity (5/18/2020)    Received from [facility]    Hunger Vital Sign     Worried About Running Out of Food in the Last Year: Never true     Ran Out of Food in the Last Year: Never true   Transportation Needs: No Transportation Needs (5/18/2020)    Received from [facility]    PRAPARE - Transportation     Lack of Transportation (Medical): No     Lack of Transportation (Non-Medical): No       Social History     Tobacco Use   Smoking Status Never   Smokeless Tobacco Never       Review of Systems:  A total of 12 systems have been reviewed and are negative except for the aforementioned findings described in HPI.     Objective Data:  Last Recorded Vitals:  Vitals:    03/11/25 1507   BP: 104/60   Pulse: 83   SpO2: 96%   Weight: 106 kg (233 lb 9.6 oz)   Height: 1.753 m (5' 9\")       Last Labs:  CBC - 3/10/2025: 10:52 AM  5.5 12.4 241    37.9      CMP - 3/10/2025: 10:52 AM  8.7 6.4 11 --- 0.6   _ 4.3 6 70      PTT - No results in last year.  _   _ _     BNP   Date/Time Value Ref Range Status   09/01/2021 12:40 PM 45 0 - 99 pg/mL Final "     Comment:     .  <100 pg/mL - Heart failure unlikely  100-299 pg/mL - Intermediate probability of acute heart  .               failure exacerbation. Correlate with clinical  .               context and patient history.    >=300 pg/mL - Heart Failure likely. Correlate with clinical  .               context and patient history.  BNP testing is performed using different testing   methodology at [facility] than at other   [facility]. Direct result comparisons should   only be made within the same method.       HGBA1C   Date/Time Value Ref Range Status   09/06/2024 08:09 AM 5.2 see below % Final   09/06/2023 11:42 AM 5.6 % Final     LDLCALC   Date/Time Value Ref Range Status   09/06/2024 08:09 AM 67 <=99 mg/dL Final     Comment:                                 Near   Borderline      AGE      Desirable  Optimal    High     High     Very High     0-19 Y     0 - 109     ---    110-129   >/= 130     ----    20-24 Y     0 - 119     ---    120-159   >/= 160     ----      >24 Y     0 -  99   100-129  130-159   160-189     >/=190     09/06/2023 11:44 AM 63 mg/dL Final     VLDL   Date/Time Value Ref Range Status   09/06/2024 08:09 AM 19 0 - 40 mg/dL Final   09/06/2023 11:44 AM 18 mg/dL Final   09/11/2020 07:30 AM 26 0 - 40 mg/dL Final        Patient Medications:  Outpatient Encounter Medications as of 3/11/2025   Medication Sig Dispense Refill    aspirin 81 mg chewable tablet Chew 1 tablet (81 mg) once daily.      atorvastatin (Lipitor) 40 mg tablet Take 1 tablet (40 mg) by mouth once daily. 30 tablet 0    carbidopa-levodopa (Sinemet)  mg tablet Take 2 tablets by mouth four times daily. 8am,12pm, 6pm and 8pm 720 tablet 3    diclofenac sodium (Voltaren) 1 % gel Apply 2 grams 4x/daily to affected area-left knee (Patient taking differently: 2.25 inches (2 g) 4 times a day as needed. Apply 2 grams 4x/daily to affected area-left knee) 100 g 1    entacapone (Comtan) 200 mg tablet Take 1 tablet by mouth four  times daily. Take with levodopa/carbidopa 120 tablet 11    ergocalciferol (Vitamin D-2) 1.25 MG (81889 UT) capsule TAKE 1 CAPSULE BY MOUTH 1 TIME PER WEEK. 4 capsule 11    gabapentin (Neurontin) 300 mg capsule Take 1 capsule (300 mg) by mouth 4 times a day. 120 capsule 2    ibuprofen 600 mg tablet Take 1 tablet (600 mg) by mouth every 8 hours if needed for mild pain (1 - 3) (as needed for left knee pain). 28 tablet 1    lactulose 20 gram/30 mL oral solution Take 15 mL by mouth once daily at bedtime. 473 mL 3    letrozole (Femara) 2.5 mg tablet Take 1 tablet (2.5 mg total) by mouth once daily. 90 tablet 2    levothyroxine (Synthroid, Levoxyl) 50 mcg tablet Take 1.5 tab(s) on Monday, Wednesday, and Friday and 1 tab daily on all other days 145 tablet 3    nitroglycerin (Nitrostat) 0.4 mg SL tablet Place 1 tablet (0.4 mg) under the tongue every 5 minutes if needed for chest pain. After the second dose call 911 25 tablet 1    cyanocobalamin (Vitamin B-12) 100 mcg tablet Take 1 tablet (100 mcg) by mouth once daily. (Patient not taking: Reported on 3/11/2025)       No facility-administered encounter medications on file as of 3/11/2025.       Physical Exam:  General: alert, oriented and in no acute distress  HEENT: NC/AT; EOMI; PERRLA, external ear is normal  Neck: supple; trachea midline; no masses; no JVD  Chest: clear breath sounds bilaterally; no wheezing  Cardio: regular rhythm, S1S2 normal, no murmurs  Abdomen: Soft, non-tender, non-distension, no organomegaly  Extremities: no clubbing/cyanosis/edema  Neuro: Grossly intact     Psychiatric: Normal mood and affect     Past Cardiology Results (Last 3 Years):  EKG:  No results found for this or any previous visit from the past 1095 days.    Echo:  Echo Results:  No results found for this or any previous visit from the past 365 days.     Cath:  No results found for this or any previous visit from the past 1095 days.    CV NCDR CATHPCI V5 COLLECTION FORM   Stress Test:  No  results found for this or any previous visit from the past 1095 days.    Cardiac Imaging:  No results found for this or any previous visit from the past 1095 days.       Assessment/Plan     Mrs. Nusrat Irizarry is a 64 y.o. year old non-smoker female  with past medical history significant for ostium secundum ASD status post closure with 35 mm Helex device March 2013, hypertension, hyperlipidemia, hypothyroidism, Parkinson's disease, breast cancer. Prior history of cardiotoxic chemotherapy. Patient received anthracycline / cyclophosphamide x2 cycles in April/May 2020 with multiple complications and did not finish her chemotherapy. Patient received radiation 7/14/2020-8/24/2020. Also prior history of pericardial effusion, and no effusion noted on last echocardiogram dated 3/5/2021.     Assessment    # Dizziness / Atypical chest pain  - Complaining of fatigue on exertion that has been chronic. She also complains of some atypical chest pain, not related to exercise, woese while laying down / sleeping, improves with nitroglycerin. She also notices some dizziness with exertion.   - Patient has had a cardiac CTA with no evidence of obstructive CAD 9/2021.   - Stress test (2022) showing no signs of ischemia.  - In case of worsening / new symptoms, we will go further into investigation.  - Keep ASA, high intensity statin.  - Follow up yearly.    # History of cardiotoxic chemotherapy (breast cancer)  - Patient received anthracycline/cyclophosphamide x2 cycles in April/May 2020 with multiple complications and did not finish her chemotherapy.  - Patient received radiation 7/14/2020-8/24/2020  - Continue Lipitor    # History of pericardial effusion  - Echocardiogram dated 3/5/2021 notes no pericardial effusion    # History of ostium secundum ASD status post closure   - Ostium secundum ASD status post closure with 35 mm Helex device March 2013  - Echocardiogram dated 3/5/2021 with closure.    # Hyperlipidemia  - Controlled by  PCP.  - Keep home medication with Atorvastatin 40mg daily.  - Counseled on healthy diet and regular exercise.     # Hypothyroidism  - Keep Levothyroxine 50mcg daily     We have discussed the most common side effects of the prescribed medications, indications, drug interactions, risks, complications, and alternatives of medications/therapeutics were explained and discussed. The patient has been requested to monitor closely for any untoward side effects or complications of medications. The patient has been strongly advised to be compliant with the recommendations, all the questions and concerns have been addressed. The patient has been also instructed to call, to return sooner or to go to the emergency department if symptoms persist or get worsen. The patient voiced understanding and denies any further questions at this time.     This note was transcribed using the Dragon Dictation system. There may be grammatical, punctuation, or verbiage errors that occur with voice recognition programs.    Counseling greater than 50% of visit regarding all cardiac issues.    Thank you, Dr. Ibarra, for allowing me to participate in the care of this patient. Please do not hesitate to contact me with any further questions or concerns.    Jorden Barrera MD  Cardiology

## 2025-03-12 ENCOUNTER — HOSPITAL ENCOUNTER (OUTPATIENT)
Dept: OPERATING ROOM | Facility: HOSPITAL | Age: 65
Setting detail: OUTPATIENT SURGERY
Discharge: HOME | End: 2025-03-12
Payer: MEDICARE

## 2025-03-12 VITALS
OXYGEN SATURATION: 97 % | BODY MASS INDEX: 34.07 KG/M2 | HEIGHT: 69 IN | SYSTOLIC BLOOD PRESSURE: 133 MMHG | WEIGHT: 230 LBS | TEMPERATURE: 98.4 F | RESPIRATION RATE: 16 BRPM | DIASTOLIC BLOOD PRESSURE: 79 MMHG | HEART RATE: 69 BPM

## 2025-03-12 DIAGNOSIS — M47.816 LUMBAR ARTHROPATHY: ICD-10-CM

## 2025-03-12 PROCEDURE — 2500000004 HC RX 250 GENERAL PHARMACY W/ HCPCS (ALT 636 FOR OP/ED): Performed by: STUDENT IN AN ORGANIZED HEALTH CARE EDUCATION/TRAINING PROGRAM

## 2025-03-12 PROCEDURE — 2720000007 HC OR 272 NO HCPCS

## 2025-03-12 PROCEDURE — 64636 DESTROY L/S FACET JNT ADDL: CPT | Mod: 50 | Performed by: STUDENT IN AN ORGANIZED HEALTH CARE EDUCATION/TRAINING PROGRAM

## 2025-03-12 RX ORDER — LIDOCAINE HYDROCHLORIDE 20 MG/ML
INJECTION, SOLUTION EPIDURAL; INFILTRATION; INTRACAUDAL; PERINEURAL AS NEEDED
Status: COMPLETED | OUTPATIENT
Start: 2025-03-12 | End: 2025-03-12

## 2025-03-12 RX ADMIN — LIDOCAINE HYDROCHLORIDE 13 ML: 20 INJECTION, SOLUTION EPIDURAL; INFILTRATION; INTRACAUDAL; PERINEURAL at 08:41

## 2025-03-12 ASSESSMENT — PAIN SCALES - GENERAL
PAINLEVEL_OUTOF10: 2
PAINLEVEL_OUTOF10: 0 - NO PAIN

## 2025-03-12 ASSESSMENT — PAIN - FUNCTIONAL ASSESSMENT
PAIN_FUNCTIONAL_ASSESSMENT: 0-10
PAIN_FUNCTIONAL_ASSESSMENT: 0-10

## 2025-03-12 NOTE — Clinical Note
ARRIVED TO ROOM PER CART AND MOVED SELF TO TABLE.  PATIENT IN PRONE POSITION PADDED WITH PILLOWS, PREPPED WITH CHLOROPREP PER DR OLGUIN, FIRE RISK SCORE 3 FOR ALCOHOL PREP AND RFA PROBE, 3 MINUTE DRY TIME OBSERVED BEFORE DRAPING.  RFA UNIT  SET TO 80 DEGREES C FOR 80 SECONDS X 2 BURNS, GROUNDING PAD SITE CLEAR BEFORE AND AFTER PROCEDURE.  OPSITE DRESSED WITH BANDAID, REPORT GIVEN TO YESSI CARDOSO RN.

## 2025-03-12 NOTE — PERIOPERATIVE NURSING NOTE
Discharge instructions reviewed by   Latha CARTER RN  no questions and verbalized understanding.   discharged amb steady gait, to exit   to be driven home by tricia Hampton.

## 2025-03-18 ENCOUNTER — APPOINTMENT (OUTPATIENT)
Dept: PRIMARY CARE | Facility: CLINIC | Age: 65
End: 2025-03-18
Payer: MEDICARE

## 2025-03-18 VITALS
SYSTOLIC BLOOD PRESSURE: 118 MMHG | WEIGHT: 224 LBS | HEIGHT: 69 IN | DIASTOLIC BLOOD PRESSURE: 68 MMHG | HEART RATE: 64 BPM | BODY MASS INDEX: 33.18 KG/M2

## 2025-03-18 DIAGNOSIS — I10 BENIGN ESSENTIAL HYPERTENSION: ICD-10-CM

## 2025-03-18 DIAGNOSIS — Z00.00 MEDICARE ANNUAL WELLNESS VISIT, SUBSEQUENT: Primary | ICD-10-CM

## 2025-03-18 DIAGNOSIS — E03.4 HYPOTHYROIDISM DUE TO ACQUIRED ATROPHY OF THYROID: ICD-10-CM

## 2025-03-18 DIAGNOSIS — Z71.89 ADVANCED CARE PLANNING/COUNSELING DISCUSSION: ICD-10-CM

## 2025-03-18 DIAGNOSIS — E66.09 CLASS 1 OBESITY DUE TO EXCESS CALORIES WITH SERIOUS COMORBIDITY AND BODY MASS INDEX (BMI) OF 33.0 TO 33.9 IN ADULT: ICD-10-CM

## 2025-03-18 DIAGNOSIS — Z85.3 PERSONAL HISTORY OF BREAST CANCER: ICD-10-CM

## 2025-03-18 DIAGNOSIS — R91.1 NODULE OF LEFT LUNG: ICD-10-CM

## 2025-03-18 DIAGNOSIS — E55.9 VITAMIN D DEFICIENCY: ICD-10-CM

## 2025-03-18 DIAGNOSIS — E66.811 CLASS 1 OBESITY DUE TO EXCESS CALORIES WITH SERIOUS COMORBIDITY AND BODY MASS INDEX (BMI) OF 33.0 TO 33.9 IN ADULT: ICD-10-CM

## 2025-03-18 DIAGNOSIS — G20.B1 PARKINSON'S DISEASE WITH DYSKINESIA, UNSPECIFIED WHETHER MANIFESTATIONS FLUCTUATE: ICD-10-CM

## 2025-03-18 DIAGNOSIS — R73.02 GLUCOSE INTOLERANCE (IMPAIRED GLUCOSE TOLERANCE): ICD-10-CM

## 2025-03-18 DIAGNOSIS — Z00.00 ROUTINE GENERAL MEDICAL EXAMINATION AT HEALTH CARE FACILITY: ICD-10-CM

## 2025-03-18 PROCEDURE — 99214 OFFICE O/P EST MOD 30 MIN: CPT | Performed by: PHYSICIAN ASSISTANT

## 2025-03-18 PROCEDURE — 1124F ACP DISCUSS-NO DSCNMKR DOCD: CPT | Performed by: PHYSICIAN ASSISTANT

## 2025-03-18 PROCEDURE — 1160F RVW MEDS BY RX/DR IN RCRD: CPT | Performed by: PHYSICIAN ASSISTANT

## 2025-03-18 PROCEDURE — 3078F DIAST BP <80 MM HG: CPT | Performed by: PHYSICIAN ASSISTANT

## 2025-03-18 PROCEDURE — 1170F FXNL STATUS ASSESSED: CPT | Performed by: PHYSICIAN ASSISTANT

## 2025-03-18 PROCEDURE — 3074F SYST BP LT 130 MM HG: CPT | Performed by: PHYSICIAN ASSISTANT

## 2025-03-18 PROCEDURE — 99497 ADVNCD CARE PLAN 30 MIN: CPT | Performed by: PHYSICIAN ASSISTANT

## 2025-03-18 PROCEDURE — G0447 BEHAVIOR COUNSEL OBESITY 15M: HCPCS | Performed by: PHYSICIAN ASSISTANT

## 2025-03-18 PROCEDURE — 1036F TOBACCO NON-USER: CPT | Performed by: PHYSICIAN ASSISTANT

## 2025-03-18 PROCEDURE — G0439 PPPS, SUBSEQ VISIT: HCPCS | Performed by: PHYSICIAN ASSISTANT

## 2025-03-18 PROCEDURE — 1159F MED LIST DOCD IN RCRD: CPT | Performed by: PHYSICIAN ASSISTANT

## 2025-03-18 PROCEDURE — 3008F BODY MASS INDEX DOCD: CPT | Performed by: PHYSICIAN ASSISTANT

## 2025-03-18 ASSESSMENT — ENCOUNTER SYMPTOMS
FLANK PAIN: 0
WOUND: 0
EYE DISCHARGE: 0
COUGH: 0
CHILLS: 0
NUMBNESS: 0
CHEST TIGHTNESS: 0
ABDOMINAL PAIN: 0
TREMORS: 1
FEVER: 0
SHORTNESS OF BREATH: 0
WHEEZING: 0
DIZZINESS: 0
SORE THROAT: 0
BRUISES/BLEEDS EASILY: 0
ARTHRALGIAS: 1
FATIGUE: 1
CONSTIPATION: 0
HEADACHES: 0
BACK PAIN: 0
EYE REDNESS: 0
VOMITING: 0
SINUS PAIN: 0
CONFUSION: 0
NAUSEA: 0
DIARRHEA: 0
FREQUENCY: 0
NECK PAIN: 0
PALPITATIONS: 0
RHINORRHEA: 0
SLEEP DISTURBANCE: 0

## 2025-03-18 ASSESSMENT — ACTIVITIES OF DAILY LIVING (ADL)
DRESSING: INDEPENDENT
GROCERY_SHOPPING: INDEPENDENT
MANAGING_FINANCES: INDEPENDENT
BATHING: INDEPENDENT
DOING_HOUSEWORK: INDEPENDENT
TAKING_MEDICATION: INDEPENDENT

## 2025-03-18 ASSESSMENT — PATIENT HEALTH QUESTIONNAIRE - PHQ9
1. LITTLE INTEREST OR PLEASURE IN DOING THINGS: NOT AT ALL
SUM OF ALL RESPONSES TO PHQ9 QUESTIONS 1 AND 2: 0
SUM OF ALL RESPONSES TO PHQ9 QUESTIONS 1 AND 2: 0
2. FEELING DOWN, DEPRESSED OR HOPELESS: NOT AT ALL
2. FEELING DOWN, DEPRESSED OR HOPELESS: NOT AT ALL
1. LITTLE INTEREST OR PLEASURE IN DOING THINGS: NOT AT ALL

## 2025-03-18 NOTE — PROGRESS NOTES
Subjective   Reason for Visit: Nusrat Irizarry is an 64 y.o. female here for a Medicare Wellness visit.     Past Medical, Surgical, and Family History reviewed and updated in chart.    Reviewed all medications by prescribing practitioner or clinical pharmacist (such as prescriptions, OTCs, herbal therapies and supplements) and documented in the medical record.    Advanced Care Planning   Diagnosis, treatment and prognosis discussed with patient.  Patient has capacity to make his/her own decision.  Patient DOES NOT have a living will.  Patient is advised to set one up and bring a copy of this documenation for the chart. >16 min spent with patient counseling      HPI    Subsequent Medicare wellness      LABS       med check   hypotension -stable   L shoulder/back pain -previously following with ortho  hypothyroid - on meds   vit D -  on meds weekly - stop march 2025   Hyperchol - on meds   PD - on meds   B12 - on supplement   Breast cancer - onc - transferred to    Stable angina/esophageal spasm?           Preventative Testing   Mammo - april 2024  DEXA- april 2024 osteopenia   colonoscopy nov 2024 - dr chun - repeat in 5 years ?   PAP - 2018 - hx of hysterectomy   Lung nodule CT chest dec 2023 stable compares to 2021   Depression screen - PHQ2 NEG March 2025   Fall -NEG March 2025      Over 15 minutes was spent discussing obesity - how this affects lab results, complicates medical problems and discussed various treatment options including diet and exercise and medications if needed- she is limited bc of mobility limitation with the PD and other symptoms        Patient Care Team:  Lynda Ibarra PA-C as PCP - General     Review of Systems   Constitutional:  Positive for fatigue. Negative for chills and fever.   HENT:  Negative for congestion, rhinorrhea, sinus pain, sore throat and tinnitus.    Eyes:  Negative for discharge, redness and visual disturbance.   Respiratory:  Negative for cough, chest  "tightness, shortness of breath and wheezing.    Cardiovascular:  Negative for chest pain, palpitations and leg swelling.   Gastrointestinal:  Negative for abdominal pain, constipation, diarrhea, nausea and vomiting.   Endocrine: Negative for cold intolerance and heat intolerance.   Genitourinary:  Negative for flank pain, frequency and urgency.   Musculoskeletal:  Positive for arthralgias and gait problem. Negative for back pain and neck pain.   Skin:  Negative for rash and wound.   Neurological:  Positive for tremors. Negative for dizziness, syncope, numbness and headaches.   Hematological:  Does not bruise/bleed easily.   Psychiatric/Behavioral:  Negative for confusion, sleep disturbance and suicidal ideas.        Objective   Vitals:  /68   Pulse 64   Ht 1.753 m (5' 9\")   Wt 102 kg (224 lb)   LMP  (LMP Unknown)   BMI 33.08 kg/m²       Physical Exam  Vitals reviewed.   Constitutional:       Appearance: Normal appearance. She is obese.   HENT:      Head: Normocephalic.      Right Ear: External ear normal.      Left Ear: External ear normal.      Nose: Nose normal. No congestion or rhinorrhea.      Mouth/Throat:      Mouth: Mucous membranes are moist.   Eyes:      Extraocular Movements: Extraocular movements intact.      Conjunctiva/sclera: Conjunctivae normal.      Pupils: Pupils are equal, round, and reactive to light.   Cardiovascular:      Rate and Rhythm: Normal rate and regular rhythm.      Pulses: Normal pulses.   Pulmonary:      Effort: Pulmonary effort is normal.      Breath sounds: Normal breath sounds.   Abdominal:      General: Bowel sounds are normal.      Palpations: Abdomen is soft.      Tenderness: There is no abdominal tenderness. There is no right CVA tenderness or left CVA tenderness.   Musculoskeletal:         General: Tenderness present. Normal range of motion.      Cervical back: Normal range of motion and neck supple. No tenderness.      Comments: With cane   Skin:     General: Skin " is warm and dry.   Neurological:      General: No focal deficit present.      Mental Status: She is alert and oriented to person, place, and time.      Comments: Observed tremor   Psychiatric:         Mood and Affect: Mood normal.         Behavior: Behavior normal.           Testing   Component      Latest Ref Rng 3/10/2025   WHITE BLOOD CELL COUNT      3.8 - 10.8 Thousand/uL 5.5    RED BLOOD CELL COUNT      3.80 - 5.10 Million/uL 4.16    HEMOGLOBIN      11.7 - 15.5 g/dL 12.4    HEMATOCRIT      35.0 - 45.0 % 37.9    MCV      80.0 - 100.0 fL 91.1    MCH      27.0 - 33.0 pg 29.8    MCHC      32.0 - 36.0 g/dL 32.7    RDW      11.0 - 15.0 % 12.4    PLATELET COUNT      140 - 400 Thousand/uL 241    MPV      7.5 - 12.5 fL 11.1    ABSOLUTE NEUTROPHILS      1,500 - 7,800 cells/uL 3,663    ABSOLUTE LYMPHOCYTES      850 - 3,900 cells/uL 1,287    ABSOLUTE MONOCYTES      200 - 950 cells/uL 468    ABSOLUTE EOSINOPHILS      15 - 500 cells/uL 61    ABSOLUTE BASOPHILS      0 - 200 cells/uL 22    NEUTROPHILS      % 66.6    LYMPHOCYTES      % 23.4    MONOCYTES      % 8.5    EOSINOPHILS      % 1.1    BASOPHILS      % 0.4    GLUCOSE      65 - 99 mg/dL 99    UREA NITROGEN (BUN)      7 - 25 mg/dL 20    CREATININE      0.50 - 1.05 mg/dL 0.96    EGFR      > OR = 60 mL/min/1.73m2 65    SODIUM      135 - 146 mmol/L 141    POTASSIUM      3.5 - 5.3 mmol/L 4.3    CHLORIDE      98 - 110 mmol/L 106    CARBON DIOXIDE      20 - 32 mmol/L 23    ELECTROLYTE BALANCE      7 - 17 mmol/L (calc) 12    CALCIUM      8.6 - 10.4 mg/dL 8.7    PROTEIN, TOTAL      6.1 - 8.1 g/dL 6.4    ALBUMIN      3.6 - 5.1 g/dL 4.3    BILIRUBIN, TOTAL      0.2 - 1.2 mg/dL 0.6    ALKALINE PHOSPHATASE      37 - 153 U/L 70    AST      10 - 35 U/L 11    ALT      6 - 29 U/L 6    IRON, TOTAL      45 - 160 mcg/dL 99    IRON BINDING CAPACITY      250 - 450 mcg/dL (calc) 256    % SATURATION      16 - 45 % (calc) 39    T4, FREE      0.8 - 1.8 ng/dL 1.2    MAGNESIUM      1.5 - 2.5 mg/dL  2.0    FERRITIN      16 - 288 ng/mL 126    TSH      0.40 - 4.50 mIU/L 2.10    VITAMIN B12      200 - 1,100 pg/mL 530    VITAMIN D,25-OH,TOTAL,IA      30 - 100 ng/mL 95      Vit d = stop supplement     Impression     MDM    1) COMPLEXITY: MORE THAN 1 STABLE CHRONIC CONDITION ADDRESSED  2)DATA: TESTS INTERPRETED AND OR ORDERED, TOOK INDEPENDENT HISTORY OR RECORDS REVIEWED  3)RISK: MODERATE RISK DUE TO NATURE OF MEDICAL CONDITIONS/COMORBIDITY OR MEDICATIONS ORDERED OR SURGICAL OR PROCEDURE REFERRAL, .       Reviewed labs and Testing on file   Patient to follow diet low in cholesterol, fat, and sodium.    Patient is advised to increase Exercise.  Patient is recommended to lose weight.  Reviewed Meds and discussed common side effects  Continue as directed   Patient is strongly advised to be compliant with recommendations.    Return to Clinic sooner if needed.  Patient denies further questions/concerns at this time     Assessment & Plan  Routine general medical examination at health care facility         Vitamin D deficiency    Orders:    Vitamin D 25-Hydroxy,Total (for eval of Vitamin D levels); Future    Benign essential hypertension    Orders:    CBC and Auto Differential; Future    Comprehensive Metabolic Panel; Future    Lipid Panel; Future    Thyroid Stimulating Hormone; Future    Thyroxine, Free; Future    Vitamin D 25-Hydroxy,Total (for eval of Vitamin D levels); Future    Hypothyroidism due to acquired atrophy of thyroid    Orders:    Thyroid Stimulating Hormone; Future    Thyroxine, Free; Future    Medicare annual wellness visit, subsequent         Advanced care planning/counseling discussion         Glucose intolerance (impaired glucose tolerance)         Class 1 obesity due to excess calories with serious comorbidity and body mass index (BMI) of 33.0 to 33.9 in adult         Personal history of breast cancer         Parkinson's disease with dyskinesia, unspecified whether manifestations fluctuate          Nodule of left lung              FU in 6 mo with labs at [facility] fasting and med check

## 2025-03-18 NOTE — ASSESSMENT & PLAN NOTE
Orders:    CBC and Auto Differential; Future    Comprehensive Metabolic Panel; Future    Lipid Panel; Future    Thyroid Stimulating Hormone; Future    Thyroxine, Free; Future    Vitamin D 25-Hydroxy,Total (for eval of Vitamin D levels); Future

## 2025-03-25 PROCEDURE — RXMED WILLOW AMBULATORY MEDICATION CHARGE

## 2025-03-26 ENCOUNTER — PHARMACY VISIT (OUTPATIENT)
Dept: PHARMACY | Facility: CLINIC | Age: 65
End: 2025-03-26
Payer: MEDICARE

## 2025-03-27 PROCEDURE — RXMED WILLOW AMBULATORY MEDICATION CHARGE

## 2025-03-28 ENCOUNTER — PHARMACY VISIT (OUTPATIENT)
Dept: PHARMACY | Facility: CLINIC | Age: 65
End: 2025-03-28
Payer: MEDICARE

## 2025-03-31 PROCEDURE — RXMED WILLOW AMBULATORY MEDICATION CHARGE

## 2025-04-01 ENCOUNTER — PHARMACY VISIT (OUTPATIENT)
Dept: PHARMACY | Facility: CLINIC | Age: 65
End: 2025-04-01
Payer: MEDICARE

## 2025-04-07 ENCOUNTER — TELEPHONE (OUTPATIENT)
Dept: PRIMARY CARE | Facility: CLINIC | Age: 65
End: 2025-04-07
Payer: MEDICARE

## 2025-04-07 NOTE — TELEPHONE ENCOUNTER
DID FERRITIN AND IRON LABS  DX USED HTN, DEF B GROUP VITAMINS NOS, VITAMIN D DEF, CHRONIC KIDNEY DS, ATROPHY OF THYROID.  THESE MIGHT NOT COVER THOSE LABS ARE THERE ANY OTHER WE CAN ADD OR GO WITH WHAT WE HAVE?  THANKS.

## 2025-04-08 ENCOUNTER — APPOINTMENT (OUTPATIENT)
Dept: ORTHOPEDIC SURGERY | Facility: CLINIC | Age: 65
End: 2025-04-08
Payer: MEDICARE

## 2025-04-08 ENCOUNTER — HOSPITAL ENCOUNTER (OUTPATIENT)
Dept: RADIOLOGY | Facility: EXTERNAL LOCATION | Age: 65
Discharge: HOME | End: 2025-04-08

## 2025-04-08 DIAGNOSIS — M25.562 ACUTE PAIN OF LEFT KNEE: ICD-10-CM

## 2025-04-08 DIAGNOSIS — M25.561 RIGHT KNEE PAIN, UNSPECIFIED CHRONICITY: ICD-10-CM

## 2025-04-08 PROCEDURE — 1160F RVW MEDS BY RX/DR IN RCRD: CPT | Performed by: SPECIALIST

## 2025-04-08 PROCEDURE — 1125F AMNT PAIN NOTED PAIN PRSNT: CPT | Performed by: SPECIALIST

## 2025-04-08 PROCEDURE — 99214 OFFICE O/P EST MOD 30 MIN: CPT | Performed by: SPECIALIST

## 2025-04-08 PROCEDURE — 1159F MED LIST DOCD IN RCRD: CPT | Performed by: SPECIALIST

## 2025-04-08 PROCEDURE — 20611 DRAIN/INJ JOINT/BURSA W/US: CPT | Performed by: SPECIALIST

## 2025-04-08 PROCEDURE — 1036F TOBACCO NON-USER: CPT | Performed by: SPECIALIST

## 2025-04-08 RX ORDER — TRIAMCINOLONE ACETONIDE 40 MG/ML
40 INJECTION, SUSPENSION INTRA-ARTICULAR; INTRAMUSCULAR
Status: COMPLETED | OUTPATIENT
Start: 2025-04-08 | End: 2025-04-08

## 2025-04-08 RX ADMIN — TRIAMCINOLONE ACETONIDE 40 MG: 40 INJECTION, SUSPENSION INTRA-ARTICULAR; INTRAMUSCULAR at 08:58

## 2025-04-08 ASSESSMENT — PAIN - FUNCTIONAL ASSESSMENT: PAIN_FUNCTIONAL_ASSESSMENT: 0-10

## 2025-04-08 ASSESSMENT — PAIN SCALES - GENERAL: PAINLEVEL_OUTOF10: 3

## 2025-04-08 ASSESSMENT — PAIN DESCRIPTION - DESCRIPTORS: DESCRIPTORS: DISCOMFORT;DULL

## 2025-04-08 NOTE — PROGRESS NOTES
Assessment/Plan   Encounter Diagnoses:  Acute pain of left knee    Right knee pain, unspecified chronicity  Left knee pain  Assessment: Left knee pain secondary to OA which responded well to cortisone injection.  She states her pain level has begun to wax.      Plan:  Continue the home exercise program.  Continue as needed pain meds Tylenol versus nonsteroidal anti-inflammatories if permitted.  Under ultrasound control 40 mg of Kenalog was injected into the left knee.  She tolerated this well.  She had a good lidocaine suppression test.  She has underlying Parkinson's which aggravates her knee and her rehab.  She recently got new medications to help control her Parkinson's.  These seem to be working better.       Subjective    Patient ID: Nusrat Irizarry is a 64 y.o. female.    Chief Complaint: Follow-up of the Left Knee (INJ 12-19-24 LAN/X-RAYS 1-23-25/)     Last Surgery: No surgery found  Last Surgery Date: No surgery found    HPI  64-year-old female with underlying Parkinson's states that for the last week to 10 days she was feeling some fullness in the knee with maybe some fluid on the knee.  Yesterday she was trying to stand up from a chair when she felt a pop in her knee.  She did not fall down.  There is no other history of trauma.  She comes in today for an acute evaluation of her left knee.    1/23/25-90% or greater improvement with the cortisone injection and knee aspiration.    4/8/2025-patient comes in today stating that her benefit has waned.  OBJECTIVE: ORTHO EXAM    Left knee:  Skin healthy and intact  No gross swelling or ecchymosis  Alignment: Slight varus  Effusion: Scant   ROM: 0 degrees Extension   130 degrees Flexion then pain.  She could do a straight leg raise.    Minimal crepitance with range of motion  No pain with internal rotation of the hip  Tenderness to palpation: Relatively nontender this a.m.  No laxity to valgus stress  No laxity to varus stress with guarding on the stress  test.  Negative Lachman´s test  Negative posterior drawer test  Minimal pain with Cristino´s test     Neurovascular exam normal distally  2+ DP pulse and good cap refill    IMAGE RESULTS:  Point of Care Ultrasound  These images are not reportable by radiology and will not be interpreted   by  Radiologists.    X-rays show no evidence of bony fracture or abnormality.  She has some Red changes but preserved joint spaces.  ULTRASOUND  DIAGNOSTIC ULTRASOUND REPORT FINAL: Left KNEE  Sonographer: Trey Banks MD  Indication: Knee Pain  Procedure: Ultrasound, extremity, nonvascular, real-time, COMPLETE, anatomic specific  Technique: B-Mode Ultrasound Examination performed using 6- 9 MHz linear transducer with [employer]  STUDY TYPE:   1. ULTRASOUND EXTREMITY  2. REAL TIME WITH IMAGE DOCUMENTATION  3. NON-VASCULAR  4. COMPLETE STUDY, INCLUDING BUT NOT LIMITED TO MUSCLE, TENDONS, LIGAMENTS, SOFT TISSUES, ADIPOSE TISSUE AND SUBCUTANEOUS TISSUE.  Site: KNEE   Live ultrasound was performed with of patient's  KNEE and PERMANENTLY documented. I personally performed the ultrasound and reviewed the findings. These show:    Ayde-articular evaluation:   An intact Quadriceps Tendon with the Quadriceps Muscle fibers showing normal striations Quadriceps Tendon demonstrating normal fibrillar pattern. . The Patellar Tendon demonstrates normal fibrillar pattern and is intact.   No significant soft tissue fluid collection/abscess appreciated.     Joint Evaluation:  The lateral joint line shows an intact LCL.   Medial joint line exam shows an intact MCL.   The patellar tendon was within normal limits.  Minimal joint effusion noted.     The patient tolerated the procedure well.        L Inj/Asp: L knee on 4/8/2025 8:58 AM  Details: ultrasound-guided superolateral approach  Medications: 40 mg triamcinolone acetonide 40 mg/mL  Aspirate: clear  Procedure, treatment alternatives, risks and benefits explained, specific risks discussed.  Immediately prior to procedure a time out was called to verify the correct patient, procedure, equipment, support staff and site/side marked as required. Patient was prepped and draped in the usual sterile fashion.            Orders Placed This Encounter    Point of Care Ultrasound    XR knee right 4+ views

## 2025-04-08 NOTE — ASSESSMENT & PLAN NOTE
Assessment: Left knee pain secondary to OA which responded well to cortisone injection.  She states her pain level has begun to wax.      Plan:  Continue the home exercise program.  Continue as needed pain meds Tylenol versus nonsteroidal anti-inflammatories if permitted.  Under ultrasound control 40 mg of Kenalog was injected into the left knee.  She tolerated this well.  She had a good lidocaine suppression test.  She has underlying Parkinson's which aggravates her knee and her rehab.  She recently got new medications to help control her Parkinson's.  These seem to be working better.

## 2025-04-14 PROCEDURE — RXMED WILLOW AMBULATORY MEDICATION CHARGE

## 2025-04-15 ENCOUNTER — PHARMACY VISIT (OUTPATIENT)
Dept: PHARMACY | Facility: CLINIC | Age: 65
End: 2025-04-15
Payer: MEDICARE

## 2025-04-23 PROCEDURE — RXMED WILLOW AMBULATORY MEDICATION CHARGE

## 2025-04-24 ENCOUNTER — PHARMACY VISIT (OUTPATIENT)
Dept: PHARMACY | Facility: CLINIC | Age: 65
End: 2025-04-24
Payer: MEDICARE

## 2025-04-24 ENCOUNTER — HOSPITAL ENCOUNTER (OUTPATIENT)
Dept: RADIOLOGY | Facility: CLINIC | Age: 65
Discharge: HOME | End: 2025-04-24
Payer: MEDICARE

## 2025-04-24 ENCOUNTER — APPOINTMENT (OUTPATIENT)
Dept: ORTHOPEDIC SURGERY | Facility: CLINIC | Age: 65
End: 2025-04-24
Payer: MEDICARE

## 2025-04-24 ENCOUNTER — HOSPITAL ENCOUNTER (OUTPATIENT)
Dept: RADIOLOGY | Facility: EXTERNAL LOCATION | Age: 65
Discharge: HOME | End: 2025-04-24

## 2025-04-24 DIAGNOSIS — M25.562 ACUTE PAIN OF LEFT KNEE: ICD-10-CM

## 2025-04-24 DIAGNOSIS — G62.9 NEUROPATHY: ICD-10-CM

## 2025-04-24 DIAGNOSIS — M25.561 RIGHT KNEE PAIN, UNSPECIFIED CHRONICITY: ICD-10-CM

## 2025-04-24 DIAGNOSIS — R20.2 NUMBNESS AND TINGLING IN BOTH HANDS: ICD-10-CM

## 2025-04-24 DIAGNOSIS — R20.0 NUMBNESS AND TINGLING IN BOTH HANDS: ICD-10-CM

## 2025-04-24 PROCEDURE — 76882 US LMTD JT/FCL EVL NVASC XTR: CPT | Performed by: SPECIALIST

## 2025-04-24 PROCEDURE — 1160F RVW MEDS BY RX/DR IN RCRD: CPT | Performed by: SPECIALIST

## 2025-04-24 PROCEDURE — 73564 X-RAY EXAM KNEE 4 OR MORE: CPT | Mod: RT

## 2025-04-24 PROCEDURE — RXMED WILLOW AMBULATORY MEDICATION CHARGE

## 2025-04-24 PROCEDURE — 99214 OFFICE O/P EST MOD 30 MIN: CPT | Performed by: SPECIALIST

## 2025-04-24 PROCEDURE — 1036F TOBACCO NON-USER: CPT | Performed by: SPECIALIST

## 2025-04-24 PROCEDURE — 1159F MED LIST DOCD IN RCRD: CPT | Performed by: SPECIALIST

## 2025-04-24 PROCEDURE — 73564 X-RAY EXAM KNEE 4 OR MORE: CPT | Mod: RIGHT SIDE | Performed by: RADIOLOGY

## 2025-04-24 RX ORDER — GABAPENTIN 300 MG/1
300 CAPSULE ORAL 4 TIMES DAILY
Qty: 120 CAPSULE | Refills: 1 | Status: SHIPPED | OUTPATIENT
Start: 2025-04-24

## 2025-04-24 ASSESSMENT — PAIN SCALES - GENERAL: PAINLEVEL_OUTOF10: 0 - NO PAIN

## 2025-04-24 ASSESSMENT — PAIN - FUNCTIONAL ASSESSMENT: PAIN_FUNCTIONAL_ASSESSMENT: 0-10

## 2025-04-24 NOTE — ASSESSMENT & PLAN NOTE
Assessment: Bilateral knees osteoarthritis with improvement status post left cortisone injection.    Plan:  Both knees are doing well so we will hold off on any further injections.  Follow-up in 3 months for reevaluation.  Ultrasound of the right knee showed no effusion.  This was explained to the patient in real-time.

## 2025-04-24 NOTE — PROGRESS NOTES
Assessment/Plan   Encounter Diagnoses:  Acute pain of left knee  No problem-specific Assessment & Plan notes found for this encounter.     Left knee pain  Assessment: Bilateral knees osteoarthritis with improvement status post left cortisone injection.    Plan:  Both knees are doing well so we will hold off on any further injections.  Follow-up in 3 months for reevaluation.  Ultrasound of the right knee showed no effusion.  This was explained to the patient in real-time.      Subjective    Patient ID: Nusrat Irizarry is a 64 y.o. female.    Chief Complaint: Follow-up and Pain of the Right Knee     Last Surgery: No surgery found  Last Surgery Date: No surgery found    HPI  64-year-old female who comes in today for evaluation of both of her knees.  On 4/8/2025 an injection into the left knee gave her significant improvement.  The right knee has subsequently also improved such that she does not feel she needs injections today.    OBJECTIVE: ORTHO EXAM    Right knee:  Skin healthy and intact  No gross swelling or ecchymosis  Alignment: Varus  Effusion: Scant  ROM: 0 degrees Extension   120 degrees Flexion  Minimal crepitance with range of motion  No pain with internal rotation of the hip  Tenderness to palpation: Medial joint line     10 degrees laxity to valgus stress  No laxity to varus stress  Negative Lachman´s test  Negative posterior drawer test  Minimal pain with Cristino´s test     Neurovascular exam normal distally  2+ DP pulse and good cap refill    IMAGE RESULTS:  Point of Care Ultrasound  These images are not reportable by radiology and will not be interpreted   by  Radiologists.      ULTRASOUND  DIAGNOSTIC ULTRASOUND REPORT FINAL: Right KNEE  Sonographer: Trey Banks MD  Indication: Knee Pain  Procedure: Ultrasound, extremity, nonvascular, real-time, COMPLETE, anatomic specific  Technique: B-Mode Ultrasound Examination performed using 6- 9 MHz linear transducer with [employer]  STUDY TYPE:   1.  ULTRASOUND EXTREMITY  2. REAL TIME WITH IMAGE DOCUMENTATION  3. NON-VASCULAR  4. COMPLETE STUDY, INCLUDING BUT NOT LIMITED TO MUSCLE, TENDONS, LIGAMENTS, SOFT TISSUES, ADIPOSE TISSUE AND SUBCUTANEOUS TISSUE.  Site: KNEE   Live ultrasound was performed with of patient's  KNEE and PERMANENTLY documented. I personally performed the ultrasound and reviewed the findings. These show:    Ayde-articular evaluation:   An intact Quadriceps Tendon with the Quadriceps Muscle fibers showing normal striations Quadriceps Tendon demonstrating normal fibrillar pattern. . The Patellar Tendon demonstrates normal fibrillar pattern and is intact.   No significant soft tissue fluid collection/abscess appreciated.     Joint Evaluation:  The lateral joint line shows an intact LCL.   Medial joint line exam shows an intact MCL.   The patellar tendon was within normal limits.  Scant to no joint effusion noted.     The patient tolerated the procedure well.        Procedures     Orders Placed This Encounter    XR knee left 4+ views    Point of Care Ultrasound

## 2025-05-06 DIAGNOSIS — Z12.39 BREAST CANCER SCREENING, HIGH RISK PATIENT: ICD-10-CM

## 2025-05-06 DIAGNOSIS — D64.9 LOW HEMOGLOBIN AND LOW HEMATOCRIT: ICD-10-CM

## 2025-05-06 DIAGNOSIS — Z85.3 PERSONAL HISTORY OF BREAST CANCER: ICD-10-CM

## 2025-05-07 DIAGNOSIS — Z85.3 PERSONAL HISTORY OF BREAST CANCER: ICD-10-CM

## 2025-05-07 DIAGNOSIS — D64.9 LOW HEMOGLOBIN AND LOW HEMATOCRIT: ICD-10-CM

## 2025-05-07 DIAGNOSIS — Z12.39 BREAST CANCER SCREENING, HIGH RISK PATIENT: ICD-10-CM

## 2025-05-08 ENCOUNTER — LAB (OUTPATIENT)
Dept: LAB | Facility: HOSPITAL | Age: 65
End: 2025-05-08
Payer: MEDICARE

## 2025-05-08 LAB
ALBUMIN SERPL BCP-MCNC: 4.4 G/DL (ref 3.4–5)
ALP SERPL-CCNC: 68 U/L (ref 33–136)
ALT SERPL W P-5'-P-CCNC: 3 U/L (ref 7–45)
ANION GAP SERPL CALC-SCNC: 11 MMOL/L (ref 10–20)
AST SERPL W P-5'-P-CCNC: 12 U/L (ref 9–39)
BASOPHILS # BLD AUTO: 0.02 X10*3/UL (ref 0–0.1)
BASOPHILS NFR BLD AUTO: 0.4 %
BILIRUB SERPL-MCNC: 0.7 MG/DL (ref 0–1.2)
BUN SERPL-MCNC: 20 MG/DL (ref 6–23)
CALCIUM SERPL-MCNC: 8.8 MG/DL (ref 8.6–10.3)
CHLORIDE SERPL-SCNC: 106 MMOL/L (ref 98–107)
CO2 SERPL-SCNC: 27 MMOL/L (ref 21–32)
CREAT SERPL-MCNC: 1.05 MG/DL (ref 0.5–1.05)
EGFRCR SERPLBLD CKD-EPI 2021: 59 ML/MIN/1.73M*2
EOSINOPHIL # BLD AUTO: 0.08 X10*3/UL (ref 0–0.7)
EOSINOPHIL NFR BLD AUTO: 1.5 %
ERYTHROCYTE [DISTWIDTH] IN BLOOD BY AUTOMATED COUNT: 12.4 % (ref 11.5–14.5)
GLUCOSE SERPL-MCNC: 102 MG/DL (ref 74–99)
HCT VFR BLD AUTO: 38.5 % (ref 36–46)
HGB BLD-MCNC: 12.8 G/DL (ref 12–16)
IMM GRANULOCYTES # BLD AUTO: 0.01 X10*3/UL (ref 0–0.7)
IMM GRANULOCYTES NFR BLD AUTO: 0.2 % (ref 0–0.9)
LYMPHOCYTES # BLD AUTO: 1.29 X10*3/UL (ref 1.2–4.8)
LYMPHOCYTES NFR BLD AUTO: 24.8 %
MCH RBC QN AUTO: 29.8 PG (ref 26–34)
MCHC RBC AUTO-ENTMCNC: 33.2 G/DL (ref 32–36)
MCV RBC AUTO: 90 FL (ref 80–100)
MONOCYTES # BLD AUTO: 0.44 X10*3/UL (ref 0.1–1)
MONOCYTES NFR BLD AUTO: 8.4 %
NEUTROPHILS # BLD AUTO: 3.37 X10*3/UL (ref 1.2–7.7)
NEUTROPHILS NFR BLD AUTO: 64.7 %
NRBC BLD-RTO: 0 /100 WBCS (ref 0–0)
PLATELET # BLD AUTO: 226 X10*3/UL (ref 150–450)
POTASSIUM SERPL-SCNC: 4.4 MMOL/L (ref 3.5–5.3)
PROT SERPL-MCNC: 6.1 G/DL (ref 6.4–8.2)
RBC # BLD AUTO: 4.3 X10*6/UL (ref 4–5.2)
SODIUM SERPL-SCNC: 140 MMOL/L (ref 136–145)
WBC # BLD AUTO: 5.2 X10*3/UL (ref 4.4–11.3)

## 2025-05-08 PROCEDURE — 80053 COMPREHEN METABOLIC PANEL: CPT

## 2025-05-08 PROCEDURE — 85025 COMPLETE CBC W/AUTO DIFF WBC: CPT

## 2025-05-11 DIAGNOSIS — E78.00 HYPERCHOLESTEREMIA: ICD-10-CM

## 2025-05-12 ENCOUNTER — APPOINTMENT (OUTPATIENT)
Dept: RADIOLOGY | Facility: HOSPITAL | Age: 65
End: 2025-05-12
Payer: MEDICARE

## 2025-05-12 ENCOUNTER — HOSPITAL ENCOUNTER (OUTPATIENT)
Dept: RADIOLOGY | Facility: HOSPITAL | Age: 65
Discharge: HOME | End: 2025-05-12
Payer: MEDICARE

## 2025-05-12 ENCOUNTER — DOCUMENTATION (OUTPATIENT)
Dept: HEMATOLOGY/ONCOLOGY | Facility: CLINIC | Age: 65
End: 2025-05-12
Payer: MEDICARE

## 2025-05-12 ENCOUNTER — HOSPITAL ENCOUNTER (OUTPATIENT)
Facility: HOSPITAL | Age: 65
Setting detail: OBSERVATION
Discharge: HOME | End: 2025-05-13
Attending: EMERGENCY MEDICINE | Admitting: HOSPITALIST
Payer: MEDICARE

## 2025-05-12 DIAGNOSIS — I26.09 ACUTE PULMONARY EMBOLISM WITH ACUTE COR PULMONALE, UNSPECIFIED PULMONARY EMBOLISM TYPE (MULTI): Primary | ICD-10-CM

## 2025-05-12 DIAGNOSIS — I26.99 PULMONARY EMBOLISM: ICD-10-CM

## 2025-05-12 DIAGNOSIS — Z12.31 ENCOUNTER FOR SCREENING MAMMOGRAM FOR HIGH-RISK PATIENT: ICD-10-CM

## 2025-05-12 DIAGNOSIS — R91.8 LUNG NODULES: ICD-10-CM

## 2025-05-12 DIAGNOSIS — Z85.3 PERSONAL HISTORY OF BREAST CANCER: ICD-10-CM

## 2025-05-12 LAB
ANION GAP SERPL CALC-SCNC: 11 MMOL/L (ref 10–20)
APTT PPP: 31 SECONDS (ref 26–36)
BASOPHILS # BLD AUTO: 0.02 X10*3/UL (ref 0–0.1)
BASOPHILS NFR BLD AUTO: 0.4 %
BNP SERPL-MCNC: 68 PG/ML (ref 0–99)
BUN SERPL-MCNC: 26 MG/DL (ref 6–23)
CALCIUM SERPL-MCNC: 8.1 MG/DL (ref 8.6–10.3)
CARDIAC TROPONIN I PNL SERPL HS: 5 NG/L (ref 0–13)
CHLORIDE SERPL-SCNC: 107 MMOL/L (ref 98–107)
CO2 SERPL-SCNC: 26 MMOL/L (ref 21–32)
CREAT SERPL-MCNC: 1.29 MG/DL (ref 0.5–1.05)
EGFRCR SERPLBLD CKD-EPI 2021: 46 ML/MIN/1.73M*2
EOSINOPHIL # BLD AUTO: 0.08 X10*3/UL (ref 0–0.7)
EOSINOPHIL NFR BLD AUTO: 1.4 %
ERYTHROCYTE [DISTWIDTH] IN BLOOD BY AUTOMATED COUNT: 12.6 % (ref 11.5–14.5)
GLUCOSE SERPL-MCNC: 91 MG/DL (ref 74–99)
HCT VFR BLD AUTO: 36.8 % (ref 36–46)
HGB BLD-MCNC: 12 G/DL (ref 12–16)
IMM GRANULOCYTES # BLD AUTO: 0.01 X10*3/UL (ref 0–0.7)
IMM GRANULOCYTES NFR BLD AUTO: 0.2 % (ref 0–0.9)
INR PPP: 1 (ref 0.9–1.1)
LACTATE SERPL-SCNC: 1 MMOL/L (ref 0.4–2)
LYMPHOCYTES # BLD AUTO: 1.2 X10*3/UL (ref 1.2–4.8)
LYMPHOCYTES NFR BLD AUTO: 21.1 %
MCH RBC QN AUTO: 29.6 PG (ref 26–34)
MCHC RBC AUTO-ENTMCNC: 32.6 G/DL (ref 32–36)
MCV RBC AUTO: 91 FL (ref 80–100)
MONOCYTES # BLD AUTO: 0.53 X10*3/UL (ref 0.1–1)
MONOCYTES NFR BLD AUTO: 9.3 %
NEUTROPHILS # BLD AUTO: 3.85 X10*3/UL (ref 1.2–7.7)
NEUTROPHILS NFR BLD AUTO: 67.6 %
NRBC BLD-RTO: 0 /100 WBCS (ref 0–0)
PLATELET # BLD AUTO: 221 X10*3/UL (ref 150–450)
POTASSIUM SERPL-SCNC: 4 MMOL/L (ref 3.5–5.3)
PROTHROMBIN TIME: 10.7 SECONDS (ref 9.8–12.4)
RBC # BLD AUTO: 4.06 X10*6/UL (ref 4–5.2)
SODIUM SERPL-SCNC: 140 MMOL/L (ref 136–145)
UFH PPP CHRO-ACNC: 1 IU/ML (ref ?–1.1)
WBC # BLD AUTO: 5.7 X10*3/UL (ref 4.4–11.3)

## 2025-05-12 PROCEDURE — 84484 ASSAY OF TROPONIN QUANT: CPT | Performed by: EMERGENCY MEDICINE

## 2025-05-12 PROCEDURE — 99291 CRITICAL CARE FIRST HOUR: CPT | Performed by: EMERGENCY MEDICINE

## 2025-05-12 PROCEDURE — 71275 CT ANGIOGRAPHY CHEST: CPT

## 2025-05-12 PROCEDURE — 85610 PROTHROMBIN TIME: CPT | Performed by: EMERGENCY MEDICINE

## 2025-05-12 PROCEDURE — 74177 CT ABD & PELVIS W/CONTRAST: CPT | Performed by: RADIOLOGY

## 2025-05-12 PROCEDURE — 80048 BASIC METABOLIC PNL TOTAL CA: CPT | Performed by: EMERGENCY MEDICINE

## 2025-05-12 PROCEDURE — G0378 HOSPITAL OBSERVATION PER HR: HCPCS

## 2025-05-12 PROCEDURE — 96361 HYDRATE IV INFUSION ADD-ON: CPT

## 2025-05-12 PROCEDURE — 2500000001 HC RX 250 WO HCPCS SELF ADMINISTERED DRUGS (ALT 637 FOR MEDICARE OP): Performed by: HOSPITALIST

## 2025-05-12 PROCEDURE — 77063 BREAST TOMOSYNTHESIS BI: CPT | Performed by: RADIOLOGY

## 2025-05-12 PROCEDURE — 2550000001 HC RX 255 CONTRASTS: Mod: JW

## 2025-05-12 PROCEDURE — 77067 SCR MAMMO BI INCL CAD: CPT

## 2025-05-12 PROCEDURE — 99291 CRITICAL CARE FIRST HOUR: CPT | Performed by: HOSPITALIST

## 2025-05-12 PROCEDURE — 74177 CT ABD & PELVIS W/CONTRAST: CPT

## 2025-05-12 PROCEDURE — 77067 SCR MAMMO BI INCL CAD: CPT | Performed by: RADIOLOGY

## 2025-05-12 PROCEDURE — 36415 COLL VENOUS BLD VENIPUNCTURE: CPT | Performed by: EMERGENCY MEDICINE

## 2025-05-12 PROCEDURE — RXMED WILLOW AMBULATORY MEDICATION CHARGE

## 2025-05-12 PROCEDURE — 96374 THER/PROPH/DIAG INJ IV PUSH: CPT | Mod: 59

## 2025-05-12 PROCEDURE — 2550000001 HC RX 255 CONTRASTS: Performed by: EMERGENCY MEDICINE

## 2025-05-12 PROCEDURE — 71275 CT ANGIOGRAPHY CHEST: CPT | Mod: FOREIGN READ | Performed by: RADIOLOGY

## 2025-05-12 PROCEDURE — 85520 HEPARIN ASSAY: CPT | Performed by: HOSPITALIST

## 2025-05-12 PROCEDURE — 2500000004 HC RX 250 GENERAL PHARMACY W/ HCPCS (ALT 636 FOR OP/ED): Mod: JZ | Performed by: EMERGENCY MEDICINE

## 2025-05-12 PROCEDURE — 99291 CRITICAL CARE FIRST HOUR: CPT | Mod: 25 | Performed by: EMERGENCY MEDICINE

## 2025-05-12 PROCEDURE — 2020000001 HC ICU ROOM DAILY

## 2025-05-12 PROCEDURE — 83880 ASSAY OF NATRIURETIC PEPTIDE: CPT | Performed by: EMERGENCY MEDICINE

## 2025-05-12 PROCEDURE — 83605 ASSAY OF LACTIC ACID: CPT | Performed by: EMERGENCY MEDICINE

## 2025-05-12 PROCEDURE — 85730 THROMBOPLASTIN TIME PARTIAL: CPT | Performed by: EMERGENCY MEDICINE

## 2025-05-12 PROCEDURE — 71260 CT THORAX DX C+: CPT | Performed by: RADIOLOGY

## 2025-05-12 PROCEDURE — 85025 COMPLETE CBC W/AUTO DIFF WBC: CPT | Performed by: EMERGENCY MEDICINE

## 2025-05-12 PROCEDURE — 99291 CRITICAL CARE FIRST HOUR: CPT | Performed by: STUDENT IN AN ORGANIZED HEALTH CARE EDUCATION/TRAINING PROGRAM

## 2025-05-12 RX ORDER — LEVOTHYROXINE SODIUM 75 UG/1
75 TABLET ORAL
Status: DISCONTINUED | OUTPATIENT
Start: 2025-05-14 | End: 2025-05-13 | Stop reason: HOSPADM

## 2025-05-12 RX ORDER — ACETAMINOPHEN 325 MG/1
650 TABLET ORAL EVERY 4 HOURS PRN
Status: DISCONTINUED | OUTPATIENT
Start: 2025-05-12 | End: 2025-05-13 | Stop reason: HOSPADM

## 2025-05-12 RX ORDER — AMANTADINE HYDROCHLORIDE 100 MG/1
100 CAPSULE, GELATIN COATED ORAL 2 TIMES DAILY
Status: DISCONTINUED | OUTPATIENT
Start: 2025-05-13 | End: 2025-05-13 | Stop reason: HOSPADM

## 2025-05-12 RX ORDER — ATORVASTATIN CALCIUM 40 MG/1
40 TABLET, FILM COATED ORAL DAILY
Qty: 90 TABLET | Refills: 3 | Status: SHIPPED | OUTPATIENT
Start: 2025-05-12

## 2025-05-12 RX ORDER — LEVOTHYROXINE SODIUM 75 UG/1
75 TABLET ORAL
Status: DISCONTINUED | OUTPATIENT
Start: 2025-05-12 | End: 2025-05-12

## 2025-05-12 RX ORDER — ATORVASTATIN CALCIUM 40 MG/1
40 TABLET, FILM COATED ORAL NIGHTLY
Status: DISCONTINUED | OUTPATIENT
Start: 2025-05-12 | End: 2025-05-13 | Stop reason: HOSPADM

## 2025-05-12 RX ORDER — ACETAMINOPHEN 650 MG/1
650 SUPPOSITORY RECTAL EVERY 4 HOURS PRN
Status: DISCONTINUED | OUTPATIENT
Start: 2025-05-12 | End: 2025-05-13 | Stop reason: HOSPADM

## 2025-05-12 RX ORDER — LACTULOSE 10 G/15ML
10 SOLUTION ORAL NIGHTLY
Status: DISCONTINUED | OUTPATIENT
Start: 2025-05-12 | End: 2025-05-13 | Stop reason: HOSPADM

## 2025-05-12 RX ORDER — NITROGLYCERIN 0.4 MG/1
0.4 TABLET SUBLINGUAL EVERY 5 MIN PRN
Status: DISCONTINUED | OUTPATIENT
Start: 2025-05-12 | End: 2025-05-13 | Stop reason: HOSPADM

## 2025-05-12 RX ORDER — HEPARIN SODIUM 10000 [USP'U]/100ML
0-4500 INJECTION, SOLUTION INTRAVENOUS CONTINUOUS
Status: DISCONTINUED | OUTPATIENT
Start: 2025-05-12 | End: 2025-05-13 | Stop reason: HOSPADM

## 2025-05-12 RX ORDER — CARBIDOPA AND LEVODOPA 25; 100 MG/1; MG/1
2 TABLET ORAL
Status: DISCONTINUED | OUTPATIENT
Start: 2025-05-12 | End: 2025-05-13 | Stop reason: HOSPADM

## 2025-05-12 RX ORDER — ONDANSETRON HYDROCHLORIDE 2 MG/ML
4 INJECTION, SOLUTION INTRAVENOUS EVERY 8 HOURS PRN
Status: DISCONTINUED | OUTPATIENT
Start: 2025-05-12 | End: 2025-05-13 | Stop reason: HOSPADM

## 2025-05-12 RX ORDER — NAPROXEN SODIUM 220 MG/1
81 TABLET, FILM COATED ORAL DAILY
Status: DISCONTINUED | OUTPATIENT
Start: 2025-05-13 | End: 2025-05-13 | Stop reason: HOSPADM

## 2025-05-12 RX ORDER — ACETAMINOPHEN 160 MG/5ML
650 SOLUTION ORAL EVERY 4 HOURS PRN
Status: DISCONTINUED | OUTPATIENT
Start: 2025-05-12 | End: 2025-05-13 | Stop reason: HOSPADM

## 2025-05-12 RX ORDER — ENTACAPONE 200 MG/1
200 TABLET ORAL 4 TIMES DAILY
Status: DISCONTINUED | OUTPATIENT
Start: 2025-05-12 | End: 2025-05-13 | Stop reason: HOSPADM

## 2025-05-12 RX ORDER — LETROZOLE 2.5 MG/1
2.5 TABLET, FILM COATED ORAL DAILY
Status: DISCONTINUED | OUTPATIENT
Start: 2025-05-13 | End: 2025-05-13 | Stop reason: HOSPADM

## 2025-05-12 RX ORDER — ONDANSETRON 4 MG/1
4 TABLET, ORALLY DISINTEGRATING ORAL EVERY 8 HOURS PRN
Status: DISCONTINUED | OUTPATIENT
Start: 2025-05-12 | End: 2025-05-13 | Stop reason: HOSPADM

## 2025-05-12 RX ORDER — DICLOFENAC SODIUM 10 MG/G
4 GEL TOPICAL 4 TIMES DAILY PRN
Status: DISCONTINUED | OUTPATIENT
Start: 2025-05-12 | End: 2025-05-13

## 2025-05-12 RX ORDER — LEVOTHYROXINE SODIUM 50 UG/1
50 TABLET ORAL
Status: DISCONTINUED | OUTPATIENT
Start: 2025-05-13 | End: 2025-05-13 | Stop reason: HOSPADM

## 2025-05-12 RX ORDER — SODIUM CHLORIDE 9 MG/ML
150 INJECTION, SOLUTION INTRAVENOUS CONTINUOUS
Status: DISCONTINUED | OUTPATIENT
Start: 2025-05-12 | End: 2025-05-12

## 2025-05-12 RX ORDER — GABAPENTIN 300 MG/1
300 CAPSULE ORAL 4 TIMES DAILY
Status: DISCONTINUED | OUTPATIENT
Start: 2025-05-12 | End: 2025-05-13 | Stop reason: HOSPADM

## 2025-05-12 RX ORDER — POLYETHYLENE GLYCOL 3350 17 G/17G
17 POWDER, FOR SOLUTION ORAL DAILY
Status: DISCONTINUED | OUTPATIENT
Start: 2025-05-12 | End: 2025-05-13 | Stop reason: HOSPADM

## 2025-05-12 RX ADMIN — IOHEXOL 70 ML: 350 INJECTION, SOLUTION INTRAVENOUS at 14:24

## 2025-05-12 RX ADMIN — ATORVASTATIN CALCIUM 40 MG: 40 TABLET, FILM COATED ORAL at 20:34

## 2025-05-12 RX ADMIN — IOHEXOL 70 ML: 350 INJECTION, SOLUTION INTRAVENOUS at 09:36

## 2025-05-12 RX ADMIN — GABAPENTIN 300 MG: 300 CAPSULE ORAL at 20:33

## 2025-05-12 RX ADMIN — HEPARIN SODIUM 1900 UNITS/HR: 10000 INJECTION, SOLUTION INTRAVENOUS at 15:25

## 2025-05-12 RX ADMIN — CARBIDOPA AND LEVODOPA 2 TABLET: 25; 100 TABLET ORAL at 20:33

## 2025-05-12 RX ADMIN — SODIUM CHLORIDE 150 ML/HR: 0.9 INJECTION, SOLUTION INTRAVENOUS at 15:22

## 2025-05-12 RX ADMIN — SODIUM CHLORIDE 1000 ML: 9 INJECTION, SOLUTION INTRAVENOUS at 13:30

## 2025-05-12 RX ADMIN — LACTULOSE 10 G: 20 SOLUTION ORAL at 20:33

## 2025-05-12 RX ADMIN — ENTACAPONE 200 MG: 200 TABLET ORAL at 20:33

## 2025-05-12 SDOH — HEALTH STABILITY: PHYSICAL HEALTH: ON AVERAGE, HOW MANY MINUTES DO YOU ENGAGE IN EXERCISE AT THIS LEVEL?: 0 MIN

## 2025-05-12 SDOH — ECONOMIC STABILITY: HOUSING INSECURITY: IN THE LAST 12 MONTHS, WAS THERE A TIME WHEN YOU WERE NOT ABLE TO PAY THE MORTGAGE OR RENT ON TIME?: YES

## 2025-05-12 SDOH — HEALTH STABILITY: PHYSICAL HEALTH: ON AVERAGE, HOW MANY DAYS PER WEEK DO YOU ENGAGE IN MODERATE TO STRENUOUS EXERCISE (LIKE A BRISK WALK)?: 0 DAYS

## 2025-05-12 SDOH — ECONOMIC STABILITY: TRANSPORTATION INSECURITY: IN THE PAST 12 MONTHS, HAS LACK OF TRANSPORTATION KEPT YOU FROM MEDICAL APPOINTMENTS OR FROM GETTING MEDICATIONS?: NO

## 2025-05-12 SDOH — SOCIAL STABILITY: SOCIAL INSECURITY: WITHIN THE LAST YEAR, HAVE YOU BEEN AFRAID OF YOUR PARTNER OR EX-PARTNER?: NO

## 2025-05-12 SDOH — SOCIAL STABILITY: SOCIAL INSECURITY: DO YOU FEEL UNSAFE GOING BACK TO THE PLACE WHERE YOU ARE LIVING?: NO

## 2025-05-12 SDOH — HEALTH STABILITY: PHYSICAL HEALTH
HOW OFTEN DO YOU NEED TO HAVE SOMEONE HELP YOU WHEN YOU READ INSTRUCTIONS, PAMPHLETS, OR OTHER WRITTEN MATERIAL FROM YOUR DOCTOR OR PHARMACY?: NEVER

## 2025-05-12 SDOH — HEALTH STABILITY: MENTAL HEALTH: HOW OFTEN DO YOU HAVE A DRINK CONTAINING ALCOHOL?: MONTHLY OR LESS

## 2025-05-12 SDOH — ECONOMIC STABILITY: INCOME INSECURITY: IN THE PAST 12 MONTHS HAS THE ELECTRIC, GAS, OIL, OR WATER COMPANY THREATENED TO SHUT OFF SERVICES IN YOUR HOME?: NO

## 2025-05-12 SDOH — SOCIAL STABILITY: SOCIAL INSECURITY: WITHIN THE LAST YEAR, HAVE YOU BEEN HUMILIATED OR EMOTIONALLY ABUSED IN OTHER WAYS BY YOUR PARTNER OR EX-PARTNER?: NO

## 2025-05-12 SDOH — SOCIAL STABILITY: SOCIAL INSECURITY: DOES ANYONE TRY TO KEEP YOU FROM HAVING/CONTACTING OTHER FRIENDS OR DOING THINGS OUTSIDE YOUR HOME?: NO

## 2025-05-12 SDOH — SOCIAL STABILITY: SOCIAL INSECURITY
WITHIN THE LAST YEAR, HAVE YOU BEEN KICKED, HIT, SLAPPED, OR OTHERWISE PHYSICALLY HURT BY YOUR PARTNER OR EX-PARTNER?: NO

## 2025-05-12 SDOH — SOCIAL STABILITY: SOCIAL INSECURITY: HAVE YOU HAD ANY THOUGHTS OF HARMING ANYONE ELSE?: NO

## 2025-05-12 SDOH — SOCIAL STABILITY: SOCIAL INSECURITY: ABUSE: ADULT

## 2025-05-12 SDOH — ECONOMIC STABILITY: FOOD INSECURITY: HOW HARD IS IT FOR YOU TO PAY FOR THE VERY BASICS LIKE FOOD, HOUSING, MEDICAL CARE, AND HEATING?: NOT HARD AT ALL

## 2025-05-12 SDOH — SOCIAL STABILITY: SOCIAL INSECURITY
WITHIN THE LAST YEAR, HAVE YOU BEEN RAPED OR FORCED TO HAVE ANY KIND OF SEXUAL ACTIVITY BY YOUR PARTNER OR EX-PARTNER?: NO

## 2025-05-12 SDOH — ECONOMIC STABILITY: FOOD INSECURITY: WITHIN THE PAST 12 MONTHS, YOU WORRIED THAT YOUR FOOD WOULD RUN OUT BEFORE YOU GOT THE MONEY TO BUY MORE.: NEVER TRUE

## 2025-05-12 SDOH — ECONOMIC STABILITY: FOOD INSECURITY: WITHIN THE PAST 12 MONTHS, THE FOOD YOU BOUGHT JUST DIDN'T LAST AND YOU DIDN'T HAVE MONEY TO GET MORE.: NEVER TRUE

## 2025-05-12 SDOH — SOCIAL STABILITY: SOCIAL INSECURITY: HAVE YOU HAD THOUGHTS OF HARMING ANYONE ELSE?: NO

## 2025-05-12 SDOH — SOCIAL STABILITY: SOCIAL INSECURITY: WERE YOU ABLE TO COMPLETE ALL THE BEHAVIORAL HEALTH SCREENINGS?: YES

## 2025-05-12 SDOH — SOCIAL STABILITY: SOCIAL INSECURITY: HAS ANYONE EVER THREATENED TO HURT YOUR FAMILY OR YOUR PETS?: NO

## 2025-05-12 SDOH — SOCIAL STABILITY: SOCIAL INSECURITY: ARE YOU OR HAVE YOU BEEN THREATENED OR ABUSED PHYSICALLY, EMOTIONALLY, OR SEXUALLY BY ANYONE?: NO

## 2025-05-12 SDOH — SOCIAL STABILITY: SOCIAL INSECURITY: ARE THERE ANY APPARENT SIGNS OF INJURIES/BEHAVIORS THAT COULD BE RELATED TO ABUSE/NEGLECT?: NO

## 2025-05-12 SDOH — SOCIAL STABILITY: SOCIAL INSECURITY: DO YOU FEEL ANYONE HAS EXPLOITED OR TAKEN ADVANTAGE OF YOU FINANCIALLY OR OF YOUR PERSONAL PROPERTY?: NO

## 2025-05-12 ASSESSMENT — COGNITIVE AND FUNCTIONAL STATUS - GENERAL
PATIENT BASELINE BEDBOUND: NO
TURNING FROM BACK TO SIDE WHILE IN FLAT BAD: A LITTLE
MOVING TO AND FROM BED TO CHAIR: A LITTLE
DRESSING REGULAR LOWER BODY CLOTHING: A LITTLE
DAILY ACTIVITIY SCORE: 22
WALKING IN HOSPITAL ROOM: A LITTLE
MOVING FROM LYING ON BACK TO SITTING ON SIDE OF FLAT BED WITH BEDRAILS: A LITTLE
STANDING UP FROM CHAIR USING ARMS: A LITTLE
CLIMB 3 TO 5 STEPS WITH RAILING: A LOT
TOILETING: A LITTLE
MOBILITY SCORE: 17

## 2025-05-12 ASSESSMENT — LIFESTYLE VARIABLES
SKIP TO QUESTIONS 9-10: 1
AUDIT-C TOTAL SCORE: 1
HOW MANY STANDARD DRINKS CONTAINING ALCOHOL DO YOU HAVE ON A TYPICAL DAY: 1 OR 2
SUBSTANCE_ABUSE_PAST_12_MONTHS: NO
PRESCIPTION_ABUSE_PAST_12_MONTHS: NO
AUDIT-C TOTAL SCORE: 1
HOW OFTEN DO YOU HAVE A DRINK CONTAINING ALCOHOL: MONTHLY OR LESS
HOW OFTEN DO YOU HAVE 6 OR MORE DRINKS ON ONE OCCASION: NEVER

## 2025-05-12 ASSESSMENT — PAIN - FUNCTIONAL ASSESSMENT
PAIN_FUNCTIONAL_ASSESSMENT: 0-10
PAIN_FUNCTIONAL_ASSESSMENT: 0-10

## 2025-05-12 ASSESSMENT — ACTIVITIES OF DAILY LIVING (ADL)
HEARING - RIGHT EAR: FUNCTIONAL
ASSISTIVE_DEVICE: CANE;WALKER
DRESSING YOURSELF: INDEPENDENT
TOILETING: INDEPENDENT
ADEQUATE_TO_COMPLETE_ADL: YES
JUDGMENT_ADEQUATE_SAFELY_COMPLETE_DAILY_ACTIVITIES: YES
WALKS IN HOME: NEEDS ASSISTANCE
LACK_OF_TRANSPORTATION: NO
HEARING - LEFT EAR: FUNCTIONAL
PATIENT'S MEMORY ADEQUATE TO SAFELY COMPLETE DAILY ACTIVITIES?: YES
BATHING: INDEPENDENT
GROOMING: INDEPENDENT
FEEDING YOURSELF: INDEPENDENT

## 2025-05-12 ASSESSMENT — PAIN SCALES - GENERAL
PAINLEVEL_OUTOF10: 0 - NO PAIN
PAINLEVEL_OUTOF10: 0 - NO PAIN

## 2025-05-12 ASSESSMENT — PATIENT HEALTH QUESTIONNAIRE - PHQ9
1. LITTLE INTEREST OR PLEASURE IN DOING THINGS: NOT AT ALL
2. FEELING DOWN, DEPRESSED OR HOPELESS: NOT AT ALL
SUM OF ALL RESPONSES TO PHQ9 QUESTIONS 1 & 2: 0

## 2025-05-12 ASSESSMENT — VISUAL ACUITY: OU: 1

## 2025-05-12 NOTE — PROGRESS NOTES
Patient was contacted regarding CT results revealing multiple pulmonary thromboembolism present in her right main pulmonary artery, right upper lobe, and left lower lobe. Patient reports she is not currently symptomatic. She does express fatigue. Advised her to be seen in the Emergency Room for possible further evaluation.

## 2025-05-12 NOTE — CONSULTS
"PULMONARY EMBOLISM RESPONSE TEAM (PERT) NOTE  This note represents a summary of a virtual evaluation by the pulmonary embolism response team requested by Dr. Barrett  Suggestions and impression are summarized from the initial virtual encounter and discussion with the referring medical team. These suggestions supplement but should not be a substitute for bedside evaluation and management by the attending of record.     PERT Members on the Call:  Critical Care Attending: Dr. Jamel OCHOA Interventional Cardiology Attending: Dr. Marroquin  Critical Care Fellow: Dr. Hickman      Brief Clinical Summary:  Nusrat Irizarry is a 64 y.o. female presenting with PE. Hx of ostium secundum ASD status post closure with 35 mm Helex device March 2013, hypertension, hyperlipidemia, hypothyroidism, Parkinson's disease, breast cancer. Prior history of cardiotoxic chemotherapy. S/P RT on Letrozole, hx of Chronic pericardial effusion.  Patient was noticed to have Multile new Pulmonary nodules, CT CAP was hernandez showing acute PE, followed By dedicated CTA chest that showed acute PE with signs of mild R heart strain. Patient endorsed vague symptoms( fatigue), but denied any new concerns, remains to be HDS     Vital Signs  /63   Pulse 72   Temp 36.4 °C (97.5 °F) (Oral)   Resp 18   Ht 1.753 m (5' 9\")   Wt 104 kg (229 lb 4.5 oz)   LMP  (LMP Unknown)   SpO2 94%   BMI 33.86 kg/m²      Laboratory  Recent Labs     09/01/21  1240 05/31/20  0803 05/17/20  0616 05/02/20  0702   BNP 45  --   --   --    LACTATE  --  1.2 0.6 1.1         PESI Score Chuck HURLEY. Et al. Arch Intern Med. 2010;[id number].           PESI Score:  103, consistent with JLPESIRISK: Class IV, or High risk (4-11.4% 30-day mortality risk) PE.    CT Scan reviewed:  Clot burden FINDINGS:  There are multilobar pulmonary artery emboli with most distal right  main pulmonary artery extending into the right upper lobe as well as  the right lower lobe and right middle lobe. There " appears to be  smaller emboli in the left lower lobe demonstrating anterior  ventricular septum. The thoracic aorta is normal in course and  caliber without dissection or aneurysm. Main pulmonary artery  measures 3.5 cm.  The heart is normal in size with small pericardial effusion. There  are mitral annular calcifications. Coronary artery calcifications are  a marker for coronary artery disease. Thoracic lymph nodes are not  enlarged. There is mild elevation of the right hemidiaphragm.  There is no pleural effusion, pleural thickening, or pneumothorax.  The airways are patent.  There are multilevel chronic changes with areas of atelectasis and  volume loss somewhat greater in the right middle lobe. There are  patchy groundglass opacities.  Small bilateral renal cysts are demonstrated.  There are no acute fractures. No suspicious bony lesions.  IMPRESSION:  Acute multilobar pulmonary artery emboli with evidence of right  ventricular heart strain.  Multilobar atelectasis.  Small pericardial effusion.  Critical findings discussed with and acknowledged by Dr. Butler  RV/LV ratio 1:2 by CT scan        Initial Impressions:  ERS/ESC Pulmonary Embolism Risk Category: JLPECLASS: High risk.        Initial Suggestions/Plans:  Admit to general Medicine Tele bed at [facility] (Her provider stated there Tele beds available in [facility] and ICU beds as well if needed).  Initial therapy suggested: Heparin GTT  Additional testing recommended: ECHO, Duplex US, Trop, BNP, lactate       To re conference the PERT team please call the  Transfer Center at [phone].

## 2025-05-12 NOTE — CONSULTS
"Inpatient consult to Cardiology  Consult performed by: Jorden Barrera MD  Consult ordered by: Johnathan Urrutia MD  Reason for consult: pulmonary embolism      History Of Present Illness:    Mrs. Nusrat Irizarry is a 64 y.o. non-smoker female being consulted by the Cardiology team for pulmonary embolism. Patient with past medical history significant for ostium secundum ASD status post closure with 35 mm Helex device March 2013, hypertension, hyperlipidemia, hypothyroidism, Parkinson's disease, breast cancer, prior history of cardiotoxic chemotherapy, S/P RT on Letrozole, hx of Chronic pericardial effusion. She presented to ED [facility] on 05/12/2025 complaining of fatigue and malaise after chemotherapy. CT showed multiple new pulmonary nodules, then CTA showed acute PE with signs of mild R heart strain. She denied chest pain, shortness of breath, palpitations, leg edema, lightheadedness, headaches, fever, chills, orthopnea, paroxysmal nocturnal dyspnea or syncope. Trop 5. BNP 68. She was started on IV heparin and admitted for clinical compensation.     Last Recorded Vitals:  Vitals:    05/12/25 1748 05/12/25 1826 05/12/25 1829 05/12/25 1842   BP: 159/89 154/77     BP Location:  Left arm     Patient Position:  Sitting     Pulse: 73 72     Resp: 20 20     Temp:  36 °C (96.8 °F)     TempSrc:    Temporal   SpO2: 94% 96%     Weight:   103 kg (226 lb 3.1 oz)    Height:  1.753 m (5' 9.02\")         Last Labs:  CBC - 5/12/2025:  1:44 PM  5.7 12.0 221    36.8      CMP - 5/12/2025:  1:44 PM  8.1 6.1 12 --- 0.7   _ 4.4 3 68      PTT - 5/12/2025:  1:44 PM  1.0   10.7 31     Troponin I, High Sensitivity   Date/Time Value Ref Range Status   05/12/2025 01:44 PM 5 0 - 13 ng/L Final     BNP   Date/Time Value Ref Range Status   05/12/2025 04:34 PM 68 0 - 99 pg/mL Final   09/01/2021 12:40 PM 45 0 - 99 pg/mL Final     Comment:     .  <100 pg/mL - Heart failure unlikely  100-299 pg/mL - Intermediate probability of acute " "heart  .               failure exacerbation. Correlate with clinical  .               context and patient history.    >=300 pg/mL - Heart Failure likely. Correlate with clinical  .               context and patient history.  BNP testing is performed using different testing   methodology at [facility] than at other   [facility] hospitals. Direct result comparisons should   only be made within the same method.       Hemoglobin A1C External   Date/Time Value Ref Range Status   09/06/2023 11:42 AM 5.6 % Final     Hemoglobin A1C   Date/Time Value Ref Range Status   09/06/2024 08:09 AM 5.2 see below % Final     LDL Calculated external   Date/Time Value Ref Range Status   09/06/2023 11:44 AM 63 mg/dL Final     LDL Calculated   Date/Time Value Ref Range Status   09/06/2024 08:09 AM 67 <=99 mg/dL Final     Comment:                                 Near   Borderline      AGE      Desirable  Optimal    High     High     Very High     0-19 Y     0 - 109     ---    110-129   >/= 130     ----    20-24 Y     0 - 119     ---    120-159   >/= 160     ----      >24 Y     0 -  99   100-129  130-159   160-189     >/=190       VLDL external   Date/Time Value Ref Range Status   09/06/2023 11:44 AM 18 mg/dL Final     VLDL   Date/Time Value Ref Range Status   09/06/2024 08:09 AM 19 0 - 40 mg/dL Final   09/11/2020 07:30 AM 26 0 - 40 mg/dL Final      Last I/O:  No intake/output data recorded.    Past Cardiology Tests (Last 3 Years):  EKG:  ECG 12 lead (Clinic Performed) 03/11/2025    Echo:  No results found for this or any previous visit from the past 1095 days.    Ejection Fractions:  No results found for: \"EF\"  Cath:  No results found for this or any previous visit from the past 1095 days.    Stress Test:  No results found for this or any previous visit from the past 1095 days.    Cardiac Imaging:  No results found for this or any previous visit from the past 1095 days.      Past Medical History:  She has a past medical history of " Acute renal insufficiency (07/12/2023), Breast cancer, Disease of thyroid gland, Effusion of left knee (08/08/2023), antineoplastic chemo (May 2020), Other specified postprocedural states, Parkinson's disease (tremor, stiffness, slow motion, unstable posture) (Multi), Personal history of colonic polyps, Personal history of irradiation (August 2020), Personal history of other diseases of the circulatory system, Personal history of other endocrine, nutritional and metabolic disease, Personal history of other endocrine, nutritional and metabolic disease, Personal history of other malignant neoplasm of skin, and Personal history of other medical treatment.    Past Surgical History:  She has a past surgical history that includes Appendectomy; Other surgical history (10/02/2018); Incisional breast biopsy; Other surgical history (01/05/2023); Other surgical history (01/21/2022); Cardiac surgery (2013); Tonsillectomy (1968); Tubal ligation (1993); Hysterectomy (04/16/2018); Colonoscopy (11/18/2019); Other surgical history (12/08/2021); Other surgical history (08/23/2021); Other surgical history (09/01/2021); Other surgical history (09/23/2021); Other surgical history (06/17/2022); Other surgical history (11/17/2022); CT angio coronary art with heartflow if score >30% (09/16/2021); Lymphadenectomy; IR injection nerve block (Bilateral, 12/15/2023); IR injection epidural steroid (N/A, 03/01/2024); Breast lumpectomy (Right); Sacroiliac joint injection (Right, 04/26/2024); Radiofrequency ablation (Bilateral, 06/28/2024); NM injection (Left, 08/16/2024); NM injection (Bilateral, 09/27/2024); Radiofrequency ablation (Bilateral, 10/25/2024); Radiofrequency ablation (Bilateral, 03/12/2025); and Oophorectomy (4/16\2018).      Social History:  She reports that she has never smoked. She has never used smokeless tobacco. She reports current alcohol use. She reports that she does not use drugs.    Family History:  Family History[1]      Allergies:  Bee venom protein (honey bee) and Zoledronic acid    Inpatient Medications:  Scheduled Medications[2]  PRN Medications[3]  Continuous Medications[4]  Outpatient Medications:  Current Outpatient Medications   Medication Instructions    amantadine (Symmetrel) 100 mg capsule Take 1 capsule by mouth two times a day.    aspirin 81 mg chewable tablet 1 tablet, Daily    atorvastatin (LIPITOR) 40 mg, oral, Daily    carbidopa-levodopa (Sinemet)  mg tablet Take 2 tablets by mouth five times a day. 8am,12pm, 6pm and 8pm and last dose upon waking up middle of the night around MIDNIGHT-2AM    diclofenac sodium (Voltaren) 1 % gel Apply 2 grams 4x/daily to affected area-left knee    entacapone (Comtan) 200 mg tablet Take 1 tablet by mouth four times daily. Take with levodopa/carbidopa    gabapentin (NEURONTIN) 300 mg, oral, 4 times daily    ibuprofen 600 mg tablet Take 1 tablet (600 mg) by mouth every 8 hours if needed for mild pain (1 - 3) (as needed for left knee pain).    lactulose 20 gram/30 mL oral solution Take 15 mL by mouth once daily at bedtime.    letrozole (FEMARA) 2.5 mg, oral, Daily    levothyroxine (Synthroid, Levoxyl) 50 mcg tablet Take 1.5 tab(s) on Monday, Wednesday, and Friday and 1 tab daily on all other days    nitroglycerin (NITROSTAT) 0.4 mg, sublingual, Every 5 min PRN, After the second dose call 911       Physical Exam:  General: alert, oriented and in no acute distress  HEENT: NC/AT; EOMI; PERRLA, external ear is normal  Neck: supple; trachea midline; no masses; no JVD  Chest: clear breath sounds bilaterally; no wheezing  Cardio: regular rhythm, S1S2 normal, no murmurs  Abdomen: Soft, non-tender, non-distension, no organomegaly  Extremities: no clubbing/cyanosis/edema  Neuro: Grossly intact     Psychiatric: Normal mood and affect      Assessment/Plan   Mrs. Nusrat Irizarry is a 64 y.o. non-smoker female being consulted by the Cardiology team for pulmonary embolism. Patient with past  medical history significant for ostium secundum ASD status post closure with 35 mm Helex device March 2013, hypertension, hyperlipidemia, hypothyroidism, Parkinson's disease, breast cancer, prior history of cardiotoxic chemotherapy, S/P RT on Letrozole, hx of Chronic pericardial effusion. She presented to ED [facility] on 05/12/2025 complaining of fatigue and malaise after chemotherapy. CT showed multiple new pulmonary nodules, then CTA showed acute PE with signs of mild R heart strain. She denied chest pain, shortness of breath, palpitations, leg edema, lightheadedness, headaches, fever, chills, orthopnea, paroxysmal nocturnal dyspnea or syncope. Trop 5. BNP 68. She was started on IV heparin and admitted for clinical compensation.     Assessment    # Pulmonary embolism - Per discussion with PERT team  - BNP 68  - Trop 5  - CTA chest (05/12/2025) showing acute multilobar pulmonary artery emboli with evidence of right ventricular heart strain.  - Underlying malignancy may be associated with PE.  - Patient admitted and started on IV Heparin drip. Discuss switching to DOAC during admission.  - Would suggest echocardiogram, US duplex LE  - Follow up in Cardiology clinic.    # Hypertension  - Controlled blood pressure.  - Patient counseled to keep a healthy lifestyle including regular exercise and low-sodium diet.  - Recommended home blood pressure monitoring.  - Goal of BP < 130/80mmHg.    # Hyperlipidemia  - Controlled by PCP.  - Keep home medication with Atorvastatin 40mg daily.  - Counseled on healthy diet and regular exercise.     # Hypothyroidism  - Keep Levothyroxine 50mcg daily.     This critically ill patient continues to be at-risk for clinically significant deterioration / failure due to the above mentioned dysfunctional, unstable organ systems.  I have personally identified and managed all complex critical care issues to prevent aforementioned clinical deterioration.  Critical care time is spent at bedside  and/or the immediate area and has included, but is not limited to, the review of diagnostic tests, labs, radiographs, serial assessments of hemodynamics, respiratory status, ventilatory management, and family updates.  Time spent in procedures and teaching are reported separately.    Critical care time: 36 minutes     Code Status:  Full Code    Jorden Barrera MD  Cardiology        [1]   Family History  Problem Relation Name Age of Onset    Heart disease Mother      Heart failure Father      Hyperthyroidism Sister      Lung cancer Sister      Breast cancer Other GRANDPARENT    [2]   Scheduled medications   Medication Dose Route Frequency    amantadine  100 mg oral BID    [START ON 5/13/2025] aspirin  81 mg oral Daily    atorvastatin  40 mg oral Nightly    carbidopa-levodopa  2 tablet oral BID    entacapone  200 mg oral 4x daily    gabapentin  300 mg oral 4x daily    lactulose  20 g oral Nightly    [START ON 5/13/2025] letrozole  2.5 mg oral Daily    levothyroxine  75 mcg oral Every Mon/Wed/Fri    perflutren lipid microspheres  0.5-10 mL of dilution intravenous Once in imaging    perflutren protein A microsphere  0.5 mL intravenous Once in imaging    polyethylene glycol  17 g oral Daily    sulfur hexafluoride microsphr  2 mL intravenous Once in imaging   [3]   PRN medications   Medication    acetaminophen    Or    acetaminophen    Or    acetaminophen    acetaminophen    Or    acetaminophen    Or    acetaminophen    diclofenac sodium    heparin    nitroglycerin    ondansetron ODT    Or    ondansetron   [4]   Continuous Medications   Medication Dose Last Rate    heparin  0-4,500 Units/hr 1,900 Units/hr (05/12/25 1525)    sodium chloride 0.9%  150 mL/hr 150 mL/hr (05/12/25 1522)

## 2025-05-12 NOTE — ED PROVIDER NOTES
Abnormal CT scan of the chest.  This 64-year-old white female states that she had a CT scan of the chest abdomen pelvis earlier today and was contacted by the physician ordered the testing that the CT scan of the chest revealed evidence of blood clots.  Patient admits to feeling very tired and mildly short of breath.  She denies any previous history of blood clot she does admit to have a history of breast cancer.  She states recently scanned her chest today as they were looking at pulmonary nodules to make sure they were not becoming cancerous.  Patient states that she takes a baby aspirin daily and denies any lower extremity swelling or pain.      History provided by:  Patient   used: No         Physical Exam  Vitals and nursing note reviewed.   Constitutional:       General: She is awake.      Appearance: Normal appearance. She is overweight.   HENT:      Head: Normocephalic and atraumatic.      Right Ear: Hearing and external ear normal.      Left Ear: Hearing and external ear normal.      Nose: Nose normal. No congestion or rhinorrhea.      Mouth/Throat:      Lips: Pink.      Mouth: Mucous membranes are moist.      Pharynx: Oropharynx is clear. Uvula midline. No oropharyngeal exudate or posterior oropharyngeal erythema.   Eyes:      General: Lids are normal. Vision grossly intact.         Right eye: No discharge.         Left eye: No discharge.      Extraocular Movements: Extraocular movements intact.      Conjunctiva/sclera: Conjunctivae normal.      Pupils: Pupils are equal, round, and reactive to light.   Cardiovascular:      Rate and Rhythm: Normal rate and regular rhythm.      Pulses: Normal pulses.      Heart sounds: Normal heart sounds. No murmur heard.     No friction rub. No gallop.   Pulmonary:      Effort: Pulmonary effort is normal. No respiratory distress.      Breath sounds: Normal breath sounds. No stridor. No wheezing, rhonchi or rales.   Chest:      Chest wall: No tenderness.    Abdominal:      General: Abdomen is protuberant. Bowel sounds are normal. There is no distension.      Palpations: Abdomen is soft. There is no mass.      Tenderness: There is no abdominal tenderness. There is no guarding or rebound.      Hernia: No hernia is present.      Comments: Patient has a benign abdominal exam.   Musculoskeletal:         General: No swelling, tenderness, deformity or signs of injury. Normal range of motion.      Cervical back: Full passive range of motion without pain, normal range of motion and neck supple.      Right lower leg: Normal. No edema.      Left lower leg: Normal. No edema.      Comments: Negative Homans' sign bilaterally   Skin:     General: Skin is warm and dry.      Capillary Refill: Capillary refill takes less than 2 seconds.      Coloration: Skin is not jaundiced or pale.      Findings: No bruising, erythema, lesion or rash.   Neurological:      General: No focal deficit present.      Mental Status: She is alert and oriented to person, place, and time.      GCS: GCS eye subscore is 4. GCS verbal subscore is 5. GCS motor subscore is 6.      Cranial Nerves: Cranial nerves 2-12 are intact. No cranial nerve deficit.      Sensory: Sensation is intact. No sensory deficit.      Motor: Motor function is intact. No weakness.      Coordination: Coordination is intact. Coordination normal.      Gait: Gait is intact.      Deep Tendon Reflexes: Reflexes normal.   Psychiatric:         Attention and Perception: Attention and perception normal.         Mood and Affect: Mood and affect normal.         Speech: Speech normal.         Behavior: Behavior normal. Behavior is cooperative.         Thought Content: Thought content normal.         Cognition and Memory: Cognition and memory normal.         Judgment: Judgment normal.          Labs Reviewed   BASIC METABOLIC PANEL - Abnormal       Result Value    Glucose 91      Sodium 140      Potassium 4.0      Chloride 107      Bicarbonate 26       Anion Gap 11      Urea Nitrogen 26 (*)     Creatinine 1.29 (*)     eGFR 46 (*)     Calcium 8.1 (*)    BASIC METABOLIC PANEL - Abnormal    Glucose 97      Sodium 138      Potassium 3.9      Chloride 108 (*)     Bicarbonate 24      Anion Gap 10      Urea Nitrogen 18      Creatinine 0.91      eGFR 70      Calcium 8.1 (*)    APTT - Normal    aPTT 31      Narrative:     The APTT is no longer used for monitoring Unfractionated Heparin Therapy. For monitoring Heparin Therapy, use the Heparin Assay.   PROTIME-INR - Normal    Protime 10.7      INR 1.0     B-TYPE NATRIURETIC PEPTIDE - Normal    BNP 68      Narrative:        <100 pg/mL - Heart failure unlikely  100-299 pg/mL - Intermediate probability of acute heart                  failure exacerbation. Correlate with clinical                  context and patient history.    >=300 pg/mL - Heart Failure likely. Correlate with clinical                  context and patient history.    BNP testing is performed using different testing methodology at [facility] than at other [facility]. Direct result comparisons should only be made within the same method.      LACTATE - Normal    Lactate 1.0      Narrative:     Venipuncture immediately after or during the administration of Metamizole may lead to falsely low results. Testing should be performed immediately prior to Metamizole dosing.   TROPONIN I, HIGH SENSITIVITY - Normal    Troponin I, High Sensitivity 5      Narrative:     Less than 99th percentile of normal range cutoff-  Female and children under 18 years old <14 ng/L; Male <21 ng/L: Negative  Repeat testing should be performed if clinically indicated.     Female and children under 18 years old 14-50 ng/L; Male 21-50 ng/L:  Consistent with possible cardiac damage and possible increased clinical   risk. Serial measurements may help to assess extent of myocardial damage.     >50 ng/L: Consistent with cardiac damage, increased clinical risk and  myocardial  infarction. Serial measurements may help assess extent of   myocardial damage.      NOTE: Children less than 1 year old may have higher baseline troponin   levels and results should be interpreted in conjunction with the overall   clinical context.     NOTE: Troponin I testing is performed using a different   testing methodology at [facility] than at other   [facility]. Direct result comparisons should only   be made within the same method.   HEPARIN ASSAY - Normal    Heparin Unfractionated 1.0      Narrative:     The therapeutic reference range for UFH may be either 0.3-0.6 IU/mL or 0.3-0.7 IU/mL based on the clinical setting for anticoagulant therapy and the associated nomogram used. For Heparin dosing guidelines based on clinical scenario and Heparin Assay results, please refer to local Pharmacy and the [facility] Guidelines for Anticoagulation Therapy available on the [facility] intranet at: [email or web address]   CBC - Normal    WBC 5.1      nRBC 0.0      RBC 4.01      Hemoglobin 12.0      Hematocrit 36.2      MCV 90      MCH 29.9      MCHC 33.1      RDW 12.4      Platelets 194     HEPARIN ASSAY - Normal    Heparin Unfractionated 0.7      Narrative:     The therapeutic reference range for UFH may be either 0.3-0.6 IU/mL or 0.3-0.7 IU/mL based on the clinical setting for anticoagulant therapy and the associated nomogram used. For Heparin dosing guidelines based on clinical scenario and Heparin Assay results, please refer to local Pharmacy and the [facility] Guidelines for Anticoagulation Therapy available on the [facility] intranet at: [email or web address]   HEPARIN ASSAY - Normal    Heparin Unfractionated 0.5      Narrative:     The therapeutic reference range for UFH may be either 0.3-0.6 IU/mL or 0.3-0.7 IU/mL based on the  clinical setting for anticoagulant therapy and the associated nomogram used. For Heparin dosing guidelines based on clinical scenario and Heparin Assay results, please refer to local Pharmacy and the [facility] Guidelines for Anticoagulation Therapy available on the [facility] intranet at: [email or web address]   CBC WITH AUTO DIFFERENTIAL    WBC 5.7      nRBC 0.0      RBC 4.06      Hemoglobin 12.0      Hematocrit 36.8      MCV 91      MCH 29.6      MCHC 32.6      RDW 12.6      Platelets 221      Neutrophils % 67.6      Immature Granulocytes %, Automated 0.2      Lymphocytes % 21.1      Monocytes % 9.3      Eosinophils % 1.4      Basophils % 0.4      Neutrophils Absolute 3.85      Immature Granulocytes Absolute, Automated 0.01      Lymphocytes Absolute 1.20      Monocytes Absolute 0.53      Eosinophils Absolute 0.08      Basophils Absolute 0.02          Transthoracic Echo Complete   Final Result      CT angio chest for pulmonary embolism   Final Result   Acute multilobar pulmonary artery emboli with evidence of right   ventricular heart strain.   Multilobar atelectasis.   Small pericardial effusion.   Critical findings discussed with and acknowledged by Dr. Luke Barrett by telephone on 5/12/2025 at 15:11.   Signed by Bennett Sanchez DO      Lower extremity venous duplex bilateral    (Results Pending)   Lower extremity venous duplex bilateral    (Results Pending)        Critical Care    Performed by: Luke Mari DO  Authorized by: Luke Mari DO    Critical care provider statement:     Critical care time (minutes):  45    Critical care time was exclusive of:  Separately billable procedures and treating other patients    Critical care was necessary to treat or prevent imminent or life-threatening deterioration of the following conditions: Pulmonary embolism.    Critical care was time spent personally by me on  the following activities:  Development of treatment plan with patient or surrogate, discussions with consultants, discussions with primary provider, evaluation of patient's response to treatment, examination of patient, obtaining history from patient or surrogate, ordering and performing treatments and interventions, ordering and review of laboratory studies, ordering and review of radiographic studies, pulse oximetry, re-evaluation of patient's condition and review of old charts    Care discussed with: admitting provider         Medical Decision Making  At 3:11 PM I was contacted by the radiologist that the patient had multiple PEs with right heart strain at that consulted with the PERT team.  3:30 PM I talked to Dr. Jessica Hickman -part of the PERT team and she stated that she would contact me later with their recommendations.   At 4:03 PM I was recontacted by Dr. Jessica Hickman -  part of the PERT team with the recommendation to admit the patient to a higher level of care such as an ICU to be closely monitored, though they have a low index of suspicion for decompensation of the patient.  They also wanted an echocardiogram of the heart, venous duplex Dopplers of the lower extremities and furtherblood work including a BNP , lactic acid and troponin I.  This 64-year-old white female with history of breast cancer that had an outpatient CT scan of the chest abdomen pelvis with IV contrast to evaluate for pulmonary nodules per the patient.  There was noted to be pulmonary emboli with large clot load noted.  The patient was ordered a CTA of the chest after being given IV fluids and basic blood work.  The CTA of the chest revealed acute multilobular pulmonary artery emboli with evidence of right ventricular heart strain.  Multilobular atelectasis.  Small pericardial effusion.  After getting these critical findings from the radiologist I started the patient on IV heparin drip and ordered a PERT consult.  The PERT team has  determined that the patient needs an echocardiogram and ultrasound of the lower extremities and wanted me to add lab work including a BNP and lactic acid and troponin.  The recommendation was to admit the patient to the ICU, they have a low concern for the patient decompensating because the patient has been doing well with a pulse ox of 94% on room air.   PERT team did want the patient to be closely monitored.  After my discussion with the PERT team.  I talked to the patient I did have active the patient about her diagnosis and plan of care.  The patient was then admitted to the hospital.         Diagnoses as of 05/13/25 0810   Acute pulmonary embolism with acute cor pulmonale, unspecified pulmonary embolism type (Multi)                    Luke Mari, DO  05/13/25 0810

## 2025-05-12 NOTE — CARE PLAN
The patient's goals for the shift include Pt will have therapeutic hep assay level    The clinical goals for the shift include use call light for needs

## 2025-05-12 NOTE — H&P
History Of Present Illness  Nusrat Irizarry is a 64 y.o. female presenting with generalized fatigue lately after chemotherapy.  Patient was evaluated with CT chest abdomen pelvis and found to have multiple new pulmonary nodules, acute PE followed by dedicated CTA chest confirming acute PE with signs of mild right heart strain.  Vascular consulted and recommended heparin drip.  Patient denies any chest pain shortness of breath palpitations or syncope or lightheadedness.  No worsening leg swelling.  No leg pains.  No headache or focal weakness.  Currently hemodynamically stable.  Denies cough hemoptysis or wheeze.  No vomiting appetite change weight loss.  Denies fever chills nausea vomiting diarrhea.  No back pain flank pain hematuria dysuria.  No joint pains or skin rash.  No other complaints at this time.  Resting tremors noted.  No hypoxia tachypnea or tachycardia.  Troponin unremarkable.  Past Medical History  Medical History[1]  Hypertension  Hyperlipidemia  Parkinson disease  Breast cancer on chemotherapy  Hypothyroidism  Pulmonary nodules  Possible chronic kidney disease  OMA  Pericardial effusion, cardiotoxicity from chemotherapy regimen  ostium secundum ASD status post closure with 35 mm Helex device March 2013       Surgical History  Surgical History[2]     Social History  She reports that she has never smoked. She has never used smokeless tobacco. She reports current alcohol use. She reports that she does not use drugs.    Family History  Family History[3]     Allergies  Bee venom protein (honey bee) and Zoledronic acid    Review of Systems  All other 12 point review systems negative except HPI  Physical Exam   General Appearance: AAO x 3,  Skin: skin color pink, warm, and dry; no suspicious rashes or lesions  Eyes : PERRL, EOM's intact  ENT: mucous membranes pink and moist  Neck: normocephalic  Respiratory: lungs clear to auscultation anteriorly; no wheezing, rhonchi, or crackles.   Heart: regular rate  "and rhythm.   Abdomen: Nondistended, positive bowel sounds x4, soft,  nontender  Extremities: no edema   Peripheral pulses: normal x4 extremities  Neuro: alert, coherent and conversant, no focal motor deficits  Last Recorded Vitals  Blood pressure 126/63, pulse 72, temperature 36.4 °C (97.5 °F), temperature source Oral, resp. rate 18, height 1.753 m (5' 9\"), weight 104 kg (229 lb 4.5 oz), SpO2 94%.    Relevant Results  Results for orders placed or performed during the hospital encounter of 05/12/25 (from the past 24 hours)   CBC and Auto Differential   Result Value Ref Range    WBC 5.7 4.4 - 11.3 x10*3/uL    nRBC 0.0 0.0 - 0.0 /100 WBCs    RBC 4.06 4.00 - 5.20 x10*6/uL    Hemoglobin 12.0 12.0 - 16.0 g/dL    Hematocrit 36.8 36.0 - 46.0 %    MCV 91 80 - 100 fL    MCH 29.6 26.0 - 34.0 pg    MCHC 32.6 32.0 - 36.0 g/dL    RDW 12.6 11.5 - 14.5 %    Platelets 221 150 - 450 x10*3/uL    Neutrophils % 67.6 40.0 - 80.0 %    Immature Granulocytes %, Automated 0.2 0.0 - 0.9 %    Lymphocytes % 21.1 13.0 - 44.0 %    Monocytes % 9.3 2.0 - 10.0 %    Eosinophils % 1.4 0.0 - 6.0 %    Basophils % 0.4 0.0 - 2.0 %    Neutrophils Absolute 3.85 1.20 - 7.70 x10*3/uL    Immature Granulocytes Absolute, Automated 0.01 0.00 - 0.70 x10*3/uL    Lymphocytes Absolute 1.20 1.20 - 4.80 x10*3/uL    Monocytes Absolute 0.53 0.10 - 1.00 x10*3/uL    Eosinophils Absolute 0.08 0.00 - 0.70 x10*3/uL    Basophils Absolute 0.02 0.00 - 0.10 x10*3/uL   Basic metabolic panel   Result Value Ref Range    Glucose 91 74 - 99 mg/dL    Sodium 140 136 - 145 mmol/L    Potassium 4.0 3.5 - 5.3 mmol/L    Chloride 107 98 - 107 mmol/L    Bicarbonate 26 21 - 32 mmol/L    Anion Gap 11 10 - 20 mmol/L    Urea Nitrogen 26 (H) 6 - 23 mg/dL    Creatinine 1.29 (H) 0.50 - 1.05 mg/dL    eGFR 46 (L) >60 mL/min/1.73m*2    Calcium 8.1 (L) 8.6 - 10.3 mg/dL   aPTT   Result Value Ref Range    aPTT 31 26 - 36 seconds   Protime-INR   Result Value Ref Range    Protime 10.7 9.8 - 12.4 seconds    " INR 1.0 0.9 - 1.1   Troponin I, High Sensitivity   Result Value Ref Range    Troponin I, High Sensitivity 5 0 - 13 ng/L     *Note: Due to a large number of results and/or encounters for the requested time period, some results have not been displayed. A complete set of results can be found in Results Review.       CT angio chest for pulmonary embolism  Result Date: 5/12/2025  STUDY: CT Angiogram of the Chest; 5/12/2025 2:25 PM INDICATION: Abnormal chest CT.  Shortness of breath. COMPARISON: CT chest 11/8/2024. ACCESSION NUMBER(S): [id number] ORDERING CLINICIAN: JONATHAN LIMON TECHNIQUE:  CTA of the chest was performed with intravenous contrast. Images are reviewed and processed at a workstation according to the CT angiogram protocol with 3-D and/or MIP post processing imaging generated.  Omnipaque 350--70 mL was administered intravenously.  Automated mA/kV exposure control was utilized and patient examination was performed in strict accordance with principles of ALARA. FINDINGS: There are multilobar pulmonary artery emboli with most distal right main pulmonary artery extending into the right upper lobe as well as the right lower lobe and right middle lobe.  There appears to be smaller emboli in the left lower lobe demonstrating anterior ventricular septum.  The thoracic aorta is normal in course and caliber without dissection or aneurysm.  Main pulmonary artery measures 3.5 cm. The heart is normal in size with small pericardial effusion.  There are mitral annular calcifications.  Coronary artery calcifications are a marker for coronary artery disease.  Thoracic lymph nodes are not enlarged.  There is mild elevation of the right hemidiaphragm. There is no pleural effusion, pleural thickening, or pneumothorax. The airways are patent. There are multilevel chronic changes with areas of atelectasis and volume loss somewhat greater in the right middle lobe.  There are patchy groundglass opacities. Small bilateral  renal cysts are demonstrated. There are no acute fractures.  No suspicious bony lesions.    Acute multilobar pulmonary artery emboli with evidence of right ventricular heart strain. Multilobar atelectasis. Small pericardial effusion. Critical findings discussed with and acknowledged by Dr. Luke Barrett by telephone on 5/12/2025 at 15:11. Signed by Bennett Sanchez,     CT chest abdomen pelvis w IV contrast  Result Date: 5/12/2025  Interpreted By:  Harinder Velasquez, STUDY: CT CHEST ABDOMEN PELVIS W IV CONTRAST;  5/12/2025 9:37 am   INDICATION: Signs/Symptoms:surviellance lung nodules.   ,Z85.3 Personal history of malignant neoplasm of breast,R91.8 Other nonspecific abnormal finding of lung field   COMPARISON: 03/17/2022, 11/08/2024, 09/16/2020   ACCESSION NUMBER(S): [id number]   ORDERING CLINICIAN: ÁNGEL MOORE   TECHNIQUE: CT of the chest, abdomen, and pelvis was performed.  Contiguous axial images were obtained at 3 mm slice thickness through the chest, abdomen and pelvis. Coronal and sagittal reconstructions at 3 mm slice thickness were performed. 70 ML of Omnipaque 350 was administered intravenously without immediate complication.   FINDINGS: CHEST:   LUNG/PLEURA/LARGE AIRWAYS: The trachea and central bronchi are patent. Subpleural fibrotic changes are present, in the right upper lobe similar to the previous examination secondary to radiation therapy. Fibrotic changes, in the medial segment of the right middle lobe are unchanged with traction bronchiectasis. Similar slight fibrotic change in the lingular segment of the left upper lobe as well as mild scarring in both lower lobes. These findings are similar to the previous examination.   Nodule left upper lobe at image 72/168 measuring 2.5 mm, less conspicuous compared to 11/08/2024. 3.7 mm nodule, left lower lobe at image 90/168 unchanged. 2 mm nodule right upper lobe medially at image 41/168 unchanged. Perifissural nodule at image 92715 in the right lung  less prominent compared to the previous examination. No new pulmonary nodules.   VESSELS: Pulmonary thromboembolism is present, in the right main pulmonary artery, segmental branches to the right upper lobe and large occlusive thrombi in the right lower lobe pulmonary artery branches. The right lower lobe pulmonary artery is enlarged secondary to the thrombus measuring 1.3 cm as seen at coronal scan 63. Small nonocclusive thrombus in a segmental branch to the left upper lobe at image 34/229 and another in a branch to the left lower lobe at image 53/229. The thrombi  are much larger in the right lung. These thromboemboli were not present on the examination of 11/08/2024.   The main pulmonary artery measures 3.6 cm in size unchanged from 11/08/2024. The thoracic aorta is normal in size. Mild calcification is seen in the aortic arch. Mild coronary artery calcifications.   HEART: Cardiomegaly is present with biventricular enlargement similar to 11/08/2024. No bowing of the ventricular septum. No evidence of right ventricle strain. Mild-to-moderate pericardial effusion is present with the pericardial thickness of 12 mm at the posteroinferior aspect of the heart, unchanged from 11/08/2024. Mitral annular calcification is present.   MEDIASTINUM AND ALESSANDRA: No mediastinal, hilar or axillary lymphadenopathy is present. Esophagus: Within normal limits.   CHEST WALL AND LOWER NECK: Within normal limits.  The visualized thyroid gland appears within normal limits.   ABDOMEN:   LIVER: Normal size of liver. Mildly decreased density, consistent with early fatty change. A hypodensity, in the right lobe of the liver at image 77/229 in segment 8 is unchanged consistent with a cyst or hemangioma. A previously seen density in segment 3 on 09/16/2020 is not visualized on 11/08/2024 or on the current examination of 05/12/2025.   BILE DUCTS: The intrahepatic and extrahepatic ducts are not dilated.   GALLBLADDER: The gallbladder is  nondistended and without evidence of radiopaque stones.   PANCREAS: Within normal limits.   SPLEEN: The spleen is normal in size.   ADRENAL GLANDS: Normal size and shape of the adrenals. No mass.   KIDNEYS AND URETERS: Both kidneys are normal in size and nephrograms. There are multiple hypodensities, present in both kidneys consistent with cysts unchanged from previous examination of 09/16/2020. The ureters are normal in size.   PELVIS:   BLADDER: The urinary bladder appears normal without abnormal wall thickening.   REPRODUCTIVE ORGANS: Status post hysterectomy.   BOWEL: The bowel loops are unremarkable. No diverticulosis. No abnormal thickening or dilatation of the bowel loops. The appendix is not visualized. The stomach is unremarkable.   VESSELS: Normal caliber of the aorta with scattered calcifications present. The IVC is unremarkable.   PERITONEUM/RETROPERITONEUM/LYMPH NODES: No ascites or free air, no fluid collection.  No abdominopelvic lymphadenopathy is present.   BONE AND SOFT TISSUE: No suspicious osseous lesions are identified. Compression of the superior endplate of L3 is present unchanged from 09/16/2020. Multilevel degenerative disc disease changes are present, with vacuum phenomena and narrowing of the intervertebral discs. The pedicles and posterior elements are intact. The abdominal wall is unremarkable.       CHEST: 1. Multiple pulmonary thromboembolism. Please see above. The findings were communicated by telephone to Lynda Covarrubias the ordering physician personally by me at 11:05 a.m. on 05/12/2025. 2. Cardiomegaly and pericardial effusion unchanged. 3. Stable changes in both lungs with fibrosis in several segments. Less than 5 mm nodules present bilaterally unchanged. No new nodules.   ABDOMEN-PELVIS: 1.  Stable CT of the abdomen since 09/16/2020. 2. No evidence of metastatic disease to the liver or adrenals. 3. Bilateral multiple renal cysts unchanged. 4. Status post hysterectomy. 5. Old fracture  of superior endplate of L3 unchanged.     MACRO: None   Signed by: Harinder Velasquez 5/12/2025 11:43 AM Dictation workstation:   [id number]    BI mammo bilateral screening tomosynthesis  Result Date: 5/12/2025  Interpreted By:  Kev Morrell, STUDY: BI MAMMO BILATERAL SCREENING TOMOSYNTHESIS;  5/12/2025 10:01 am   ACCESSION NUMBER(S): [id number]   ORDERING CLINICIAN: ÁNGEL MOORE   INDICATION: Screening. History of right-sided breast cancer, status post lumpectomy.   COMPARISON: Digital mammograms dated 04/26/2024   FINDINGS: CC and MLO 2D digital mammograms and digital breast tomosynthesis images were obtained of the bilateral breasts. 3-D volume images were reconstructed in 4 views at an independent workstation as 1 mm slices through the breasts in both the CC and MLO projections.   Density:  There are scattered areas of fibroglandular density.   Severe architectural distortion is seen in the 6 o'clock position of the right breast, similar to prior studies, consistent with scarring. No new or enlarging mass or focal asymmetry is identified. No suspicious microcalcifications or new foci of architectural distortion are seen. There has been no significant change.   This study was interpreted with CAD.       No mammographic evidence of malignancy.   BI-RADS CATEGORY:   BI-RADS Category:  2 Benign. Recommendation:  Annual Screening. Recommended Date:  1 Year. Laterality:  Bilateral.     MACRO: None   Signed by: Kev Morrell 5/12/2025 10:42 AM Dictation workstation:   [id number]      Scheduled medications  Scheduled Medications[4]  Continuous medications  Continuous Medications[5]  PRN medications  PRN Medications[6]             Assessment & Plan  Acute pulmonary embolism with acute cor pulmonale, unspecified pulmonary embolism type (Multi)        64-year-old female with history of    Hypertension  Hyperlipidemia  Parkinson disease  Breast cancer on chemotherapy  Hypothyroidism  Pulmonary nodules  Possible chronic  kidney disease  OMA  Pericardial effusion, cardiotoxicity from chemotherapy regimen  ostium secundum ASD status post closure with 35 mm Helex device March 2013     Presenting with  acute pulmonary embolism    Plan  Admit to ICU  Telemetry  Heparin drip and possibly switch to DOAC's tomorrow  Follow cardiovascular team recommendations  Follow echocardiogram, venous duplex ultrasound lower extremity to evaluate DVT  Check BNP, lactate level  Underlying malignancy potential risk for PE  Supportive care  Education counseling  Symptomatic management  Continue home meds  Hydrate  Daily BMP, urine output  Follow CBC  Watch for signs of bleeding  On amantadine, aspirin, atorvastatin, Sinemet, entacapone, Neurontin, lactulose, Tylenol, Zofran, MiraLAX, Synthroid  DVT prophylaxis per policy  Monitor hemodynamics  This critically ill patient continues to be at-risk for clinically significant deterioration / failure due to the above mentioned dysfunctional, unstable organ systems.  I have personally identified and managed all complex critical care issues to prevent aforementioned clinical deterioration.  Critical care time is spent at bedside and/or the immediate area and has included, but is not limited to, the review of diagnostic tests, labs, radiographs, serial assessments of hemodynamics, respiratory status, ventilatory management, and family updates.  Time spent in procedures and teaching are reported separately.     Critical care time: 60 minutes                  Johnathan Urrutia MD         [1]   Past Medical History:  Diagnosis Date    Acute renal insufficiency 07/12/2023    Breast cancer     Disease of thyroid gland     Effusion of left knee 08/08/2023    Hx antineoplastic chemo May 2020    Other specified postprocedural states     History of Papanicolaou smear    Parkinson's disease (tremor, stiffness, slow motion, unstable posture) (Multi)     Personal history of colonic polyps     Personal history of colonic polyps     Personal history of irradiation August 2020    Personal history of other diseases of the circulatory system     History of hypertension    Personal history of other endocrine, nutritional and metabolic disease     History of hypothyroidism    Personal history of other endocrine, nutritional and metabolic disease     History of hyperglycemia    Personal history of other malignant neoplasm of skin     History of malignant neoplasm of skin    Personal history of other medical treatment     History of screening mammography   [2]   Past Surgical History:  Procedure Laterality Date    APPENDECTOMY      Appendectomy    BREAST LUMPECTOMY Right     CARDIAC SURGERY  2013    REPAIR OF HOLE IN HEART    COLONOSCOPY  11/18/2019    NORMAL; H/O COLONIC POLYPS IN PAST-REPEAT IN 5 YEARS    CT ANGIO CORONARY ART WITH HEARTFLOW IF SCORE >30%  09/16/2021    CT HEART CORONARY ANGIOGRAM 9/16/2021 [facility] ANCILLARY LEGACY    HYSTERECTOMY  04/16/2018    [facility]    INCISIONAL BREAST BIOPSY      Incisional Breast Biopsy    INJECTION Left 08/16/2024    Left Sacroiliac Joint Injection    INJECTION Bilateral 09/27/2024    superior Cluneal nerve block    IR INJECTION EPIDURAL STEROID N/A 03/01/2024    L5-S1    IR INJECTION NERVE BLOCK Bilateral 12/15/2023    Bilat L2/3 and L3/4 MBB    LYMPHADENECTOMY      axillary    OOPHORECTOMY  4/16\2018    OTHER SURGICAL HISTORY  10/02/2018    Bunion Correction By Osborne Procedure    OTHER SURGICAL HISTORY  01/05/2023    Intra-articular corticosteroid injection    OTHER SURGICAL HISTORY  01/21/2022    Epidural steroid injection    OTHER SURGICAL HISTORY  12/08/2021    Radiofrequency ablation    OTHER SURGICAL HISTORY  08/23/2021    Medial branch block    OTHER SURGICAL HISTORY  09/01/2021    Carpal tunnel surgery    OTHER SURGICAL HISTORY  09/23/2021    Medial branch block    OTHER SURGICAL HISTORY  06/17/2022    Epidural steroid injection    OTHER SURGICAL HISTORY  11/17/2022    Epidural  steroid injection    RADIOFREQUENCY ABLATION Bilateral 06/28/2024    Bilat L3-5 MB RFA    RADIOFREQUENCY ABLATION Bilateral 10/25/2024    Bilat Superior Cluneal RFA    RADIOFREQUENCY ABLATION Bilateral 03/12/2025    Bilat L4/5-L5/S1 RFA    SACROILIAC JOINT INJECTION Right 04/26/2024    Rt SIJ    TONSILLECTOMY  1968    Tonsillectomy With Adenoidectomy    TUBAL LIGATION  1993    Tubal Ligation   [3]   Family History  Problem Relation Name Age of Onset    Heart disease Mother      Heart failure Father      Hyperthyroidism Sister      Lung cancer Sister      Breast cancer Other GRANDPARENT    [4]    [5] heparin, 0-4,500 Units/hr, Last Rate: 1,900 Units/hr (05/12/25 1525)  sodium chloride 0.9%, 150 mL/hr, Last Rate: 150 mL/hr (05/12/25 1522)  [6] PRN medications: heparin

## 2025-05-13 ENCOUNTER — APPOINTMENT (OUTPATIENT)
Dept: CARDIOLOGY | Facility: HOSPITAL | Age: 65
End: 2025-05-13
Payer: MEDICARE

## 2025-05-13 ENCOUNTER — PHARMACY VISIT (OUTPATIENT)
Dept: PHARMACY | Facility: CLINIC | Age: 65
End: 2025-05-13
Payer: MEDICARE

## 2025-05-13 ENCOUNTER — APPOINTMENT (OUTPATIENT)
Dept: VASCULAR MEDICINE | Facility: HOSPITAL | Age: 65
End: 2025-05-13
Payer: MEDICARE

## 2025-05-13 VITALS
SYSTOLIC BLOOD PRESSURE: 150 MMHG | DIASTOLIC BLOOD PRESSURE: 86 MMHG | HEIGHT: 69 IN | HEART RATE: 77 BPM | OXYGEN SATURATION: 95 % | TEMPERATURE: 97.3 F | RESPIRATION RATE: 21 BRPM | BODY MASS INDEX: 33.5 KG/M2 | WEIGHT: 226.19 LBS

## 2025-05-13 PROBLEM — I26.09 ACUTE PULMONARY EMBOLISM WITH ACUTE COR PULMONALE, UNSPECIFIED PULMONARY EMBOLISM TYPE (MULTI): Status: RESOLVED | Noted: 2025-05-12 | Resolved: 2025-05-13

## 2025-05-13 LAB
ANION GAP SERPL CALC-SCNC: 10 MMOL/L (ref 10–20)
AORTIC VALVE MEAN GRADIENT: 7 MMHG
AORTIC VALVE PEAK VELOCITY: 1.87 M/S
AV PEAK GRADIENT: 14 MMHG
AVA (PEAK VEL): 1.91 CM2
AVA (VTI): 1.88 CM2
BUN SERPL-MCNC: 18 MG/DL (ref 6–23)
CALCIUM SERPL-MCNC: 8.1 MG/DL (ref 8.6–10.3)
CHLORIDE SERPL-SCNC: 108 MMOL/L (ref 98–107)
CO2 SERPL-SCNC: 24 MMOL/L (ref 21–32)
CREAT SERPL-MCNC: 0.91 MG/DL (ref 0.5–1.05)
EGFRCR SERPLBLD CKD-EPI 2021: 70 ML/MIN/1.73M*2
EJECTION FRACTION APICAL 4 CHAMBER: 54.3
EJECTION FRACTION: 55 %
ERYTHROCYTE [DISTWIDTH] IN BLOOD BY AUTOMATED COUNT: 12.4 % (ref 11.5–14.5)
GLUCOSE SERPL-MCNC: 97 MG/DL (ref 74–99)
HCT VFR BLD AUTO: 36.2 % (ref 36–46)
HGB BLD-MCNC: 12 G/DL (ref 12–16)
LEFT ATRIUM VOLUME AREA LENGTH INDEX BSA: 29.5 ML/M2
LEFT VENTRICLE INTERNAL DIMENSION DIASTOLE: 4.93 CM (ref 3.5–6)
LEFT VENTRICULAR OUTFLOW TRACT DIAMETER: 1.9 CM
LV EJECTION FRACTION BIPLANE: 54 %
MCH RBC QN AUTO: 29.9 PG (ref 26–34)
MCHC RBC AUTO-ENTMCNC: 33.1 G/DL (ref 32–36)
MCV RBC AUTO: 90 FL (ref 80–100)
MITRAL VALVE E/A RATIO: 0.73
NRBC BLD-RTO: 0 /100 WBCS (ref 0–0)
PLATELET # BLD AUTO: 194 X10*3/UL (ref 150–450)
POTASSIUM SERPL-SCNC: 3.9 MMOL/L (ref 3.5–5.3)
RBC # BLD AUTO: 4.01 X10*6/UL (ref 4–5.2)
RIGHT VENTRICLE PEAK SYSTOLIC PRESSURE: 37.8 MMHG
SODIUM SERPL-SCNC: 138 MMOL/L (ref 136–145)
TRICUSPID ANNULAR PLANE SYSTOLIC EXCURSION: 2.1 CM
UFH PPP CHRO-ACNC: 0.5 IU/ML (ref ?–1.1)
UFH PPP CHRO-ACNC: 0.7 IU/ML (ref ?–1.1)
WBC # BLD AUTO: 5.1 X10*3/UL (ref 4.4–11.3)

## 2025-05-13 PROCEDURE — 85520 HEPARIN ASSAY: CPT | Performed by: INTERNAL MEDICINE

## 2025-05-13 PROCEDURE — 99239 HOSP IP/OBS DSCHRG MGMT >30: CPT | Performed by: HOSPITALIST

## 2025-05-13 PROCEDURE — 2500000002 HC RX 250 W HCPCS SELF ADMINISTERED DRUGS (ALT 637 FOR MEDICARE OP, ALT 636 FOR OP/ED): Performed by: HOSPITALIST

## 2025-05-13 PROCEDURE — 36415 COLL VENOUS BLD VENIPUNCTURE: CPT | Performed by: HOSPITALIST

## 2025-05-13 PROCEDURE — G0378 HOSPITAL OBSERVATION PER HR: HCPCS

## 2025-05-13 PROCEDURE — 93306 TTE W/DOPPLER COMPLETE: CPT | Performed by: INTERNAL MEDICINE

## 2025-05-13 PROCEDURE — 99291 CRITICAL CARE FIRST HOUR: CPT | Performed by: STUDENT IN AN ORGANIZED HEALTH CARE EDUCATION/TRAINING PROGRAM

## 2025-05-13 PROCEDURE — 2500000004 HC RX 250 GENERAL PHARMACY W/ HCPCS (ALT 636 FOR OP/ED): Mod: JZ | Performed by: HOSPITALIST

## 2025-05-13 PROCEDURE — 2500000004 HC RX 250 GENERAL PHARMACY W/ HCPCS (ALT 636 FOR OP/ED): Performed by: HOSPITALIST

## 2025-05-13 PROCEDURE — 2500000001 HC RX 250 WO HCPCS SELF ADMINISTERED DRUGS (ALT 637 FOR MEDICARE OP): Performed by: HOSPITALIST

## 2025-05-13 PROCEDURE — 80048 BASIC METABOLIC PNL TOTAL CA: CPT | Performed by: HOSPITALIST

## 2025-05-13 PROCEDURE — 85027 COMPLETE CBC AUTOMATED: CPT | Performed by: HOSPITALIST

## 2025-05-13 PROCEDURE — 93970 EXTREMITY STUDY: CPT | Performed by: SURGERY

## 2025-05-13 PROCEDURE — 96376 TX/PRO/DX INJ SAME DRUG ADON: CPT | Mod: 59

## 2025-05-13 PROCEDURE — 36415 COLL VENOUS BLD VENIPUNCTURE: CPT | Performed by: INTERNAL MEDICINE

## 2025-05-13 PROCEDURE — 93970 EXTREMITY STUDY: CPT

## 2025-05-13 PROCEDURE — RXMED WILLOW AMBULATORY MEDICATION CHARGE

## 2025-05-13 PROCEDURE — C8929 TTE W OR WO FOL WCON,DOPPLER: HCPCS

## 2025-05-13 RX ORDER — DICLOFENAC SODIUM 10 MG/G
4 GEL TOPICAL 4 TIMES DAILY PRN
Status: DISCONTINUED | OUTPATIENT
Start: 2025-05-13 | End: 2025-05-13 | Stop reason: HOSPADM

## 2025-05-13 RX ADMIN — AMANTADINE 100 MG: 100 CAPSULE ORAL at 08:42

## 2025-05-13 RX ADMIN — LEVOTHYROXINE SODIUM 50 MCG: 0.05 TABLET ORAL at 04:58

## 2025-05-13 RX ADMIN — HEPARIN SODIUM 1700 UNITS/HR: 10000 INJECTION, SOLUTION INTRAVENOUS at 01:57

## 2025-05-13 RX ADMIN — ASPIRIN 81 MG: 81 TABLET, CHEWABLE ORAL at 08:42

## 2025-05-13 RX ADMIN — LETROZOLE TABLETS 2.5 MG: 2.5 TABLET, FILM COATED ORAL at 08:42

## 2025-05-13 RX ADMIN — GABAPENTIN 300 MG: 300 CAPSULE ORAL at 06:17

## 2025-05-13 RX ADMIN — POLYETHYLENE GLYCOL 3350 17 G: 17 POWDER, FOR SOLUTION ORAL at 08:42

## 2025-05-13 RX ADMIN — GABAPENTIN 300 MG: 300 CAPSULE ORAL at 12:24

## 2025-05-13 RX ADMIN — CARBIDOPA AND LEVODOPA 2 TABLET: 25; 100 TABLET ORAL at 00:00

## 2025-05-13 RX ADMIN — CARBIDOPA AND LEVODOPA 2 TABLET: 25; 100 TABLET ORAL at 12:24

## 2025-05-13 RX ADMIN — PERFLUTREN 10 ML OF DILUTION: 6.52 INJECTION, SUSPENSION INTRAVENOUS at 04:54

## 2025-05-13 RX ADMIN — APIXABAN 10 MG: 5 TABLET, FILM COATED ORAL at 12:59

## 2025-05-13 RX ADMIN — CARBIDOPA AND LEVODOPA 2 TABLET: 25; 100 TABLET ORAL at 08:42

## 2025-05-13 RX ADMIN — ENTACAPONE 200 MG: 200 TABLET ORAL at 06:17

## 2025-05-13 ASSESSMENT — PAIN SCALES - GENERAL: PAINLEVEL_OUTOF10: 0 - NO PAIN

## 2025-05-13 ASSESSMENT — PAIN - FUNCTIONAL ASSESSMENT: PAIN_FUNCTIONAL_ASSESSMENT: 0-10

## 2025-05-13 ASSESSMENT — ACTIVITIES OF DAILY LIVING (ADL): LACK_OF_TRANSPORTATION: NO

## 2025-05-13 NOTE — DISCHARGE SUMMARY
Discharge Diagnosis  Acute pulmonary embolism with acute cor pulmonale, unspecified pulmonary embolism type (Multi)           Issues Requiring Follow-Up  Hematology oncology    Discharge Meds     Medication List      START taking these medications     apixaban 5 mg tablet; Commonly known as: Eliquis; Take 2 tablets (10 mg)   by mouth 2 times a day for 7 days, THEN 1 tablet (5 mg) 2 times a day.;   Start taking on: May 13, 2025     CONTINUE taking these medications     amantadine 100 mg capsule; Commonly known as: Symmetrel; Take 1 capsule   by mouth two times a day.   aspirin 81 mg chewable tablet   atorvastatin 40 mg tablet; Commonly known as: Lipitor; Take 1 tablet (40   mg) by mouth once daily.   carbidopa-levodopa  mg tablet; Commonly known as: Sinemet; Take 2   tablets by mouth five times a day. 8am,12pm, 6pm and 8pm and last dose   upon waking up middle of the night around MIDNIGHT-2AM   diclofenac sodium 1 % gel; Commonly known as: Voltaren; Apply 2 grams   4x/daily to affected area-left knee   entacapone 200 mg tablet; Commonly known as: Comtan; Take 1 tablet by   mouth four times daily. Take with levodopa/carbidopa   Enulose 10 gram/15 mL oral solution; Generic drug: lactulose; Take 15 mL   by mouth once daily at bedtime.   gabapentin 300 mg capsule; Commonly known as: Neurontin; Take 1 capsule   (300 mg) by mouth 4 times a day.   letrozole 2.5 mg tablet; Commonly known as: Femara; Take 1 tablet (2.5   mg total) by mouth once daily.   levothyroxine 50 mcg tablet; Commonly known as: Synthroid, Levoxyl; Take   1.5 tab(s) on Monday, Wednesday, and Friday and 1 tab daily on all other   days   nitroglycerin 0.4 mg SL tablet; Commonly known as: Nitrostat; Place 1   tablet (0.4 mg) under the tongue every 5 minutes if needed for chest pain.   After the second dose call 911     STOP taking these medications     ibuprofen 600 mg tablet       Test Results Pending At Discharge  Pending Labs       No current  pending labs.            Hospital Course            64-year-old female with history of     Hypertension  Hyperlipidemia  Parkinson disease  Breast cancer on chemotherapy  Hypothyroidism  Pulmonary nodules  Possible chronic kidney disease  OMA  Pericardial effusion, cardiotoxicity from chemotherapy regimen  ostium secundum ASD status post closure with 35 mm Helex device March 2013      Presented  with  acute pulmonary embolism     Hospital course  Monitored on telemetry  Hemodynamically stable  Venous duplex ultrasound possibly no DVT  Echocardiogram with optimal EF  Asymptomatic  On heparin drip switching to Eliquis upon discharge  No oncology outpatient  Underlying malignancy likely contributing  Supportive care symptomatic management  Education counseling  Followed up labs-stable  Continued home meds  On amantadine, aspirin, atorvastatin, Sinemet, entacapone, Neurontin, lactulose, Tylenol, Zofran, MiraLAX, Synthroid  DVT prophylaxis per policy  Can DC home today  Pertinent Physical Exam At Time of Discharge  Physical Exam  General Appearance: AAO x 3,  Skin: skin color pink, warm, and dry; no suspicious rashes or lesions  Eyes : PERRL, EOM's intact  ENT: mucous membranes pink and moist  Neck: normocephalic  Respiratory: lungs clear to auscultation anteriorly; no wheezing, rhonchi, or crackles.   Heart: regular rate and rhythm.   Abdomen: Nondistended, positive bowel sounds x4, soft,  nontender  Extremities: no edema   Peripheral pulses: normal x4 extremities  Neuro: alert, coherent and conversant, no focal motor deficits  Outpatient Follow-Up  Future Appointments   Date Time Provider Department Center   5/19/2025 10:30 AM YOHANA Ramachandran-CNP [id number] [facility]   7/24/2025  9:45 AM Trey Banks MD [id number] [facility]   9/18/2025  9:40 AM Lynda Ibarra PA-C [id number] [facility]   3/13/2026 10:00 AM Jorden Barrera MD [id number] [facility]   Time to dc > 35 minutes      Johnathan Urrutia MD

## 2025-05-13 NOTE — NURSING NOTE
Patient educated on discharge and when to call for potential issues. Patient medication review, IV removed intact.

## 2025-05-13 NOTE — PROGRESS NOTES
Medication Education     Medication education for Nusrat Irizarry was provided to the patient  for the following medication(s):  Eliquis    Medication education provided by a Pharmacist:  ADR Counseling Alternative method of administration Medication interactions Alternative drug Dose, frequency, storage How to take and what to do if a dose is missed Proper dose, indication, possible ADRs Refilling the medication  How the medication works and benefits of taking it    Identified potential barriers to education:  None    Method(s) of Education:  Verbal Written materials provided and reviewed    An opportunity to ask questions and receive answers was provided.     Assessment of understanding the patient :  2= meets goals/outcomes    Additional Notes (if applicable): Meds to beds provided for patient for Eliquis. Patient given Eliquis started pack. Patients copay for Eliquis is $143/month. Patient is unable to afford this. Consult put in for clinical pharmacy to evaluate patient for pharmacy discharge follow up to get Eliquis at a lower price. Patient should meet criteria as Eliquis is covered, just at a high copay, patient has Dr. Benjamin ( PCP) and combined income with  is <$85,000/year. Patient verbalized understanding of medications.     Emani Kerr, PharmD

## 2025-05-13 NOTE — PROGRESS NOTES
05/13/25 1319   Discharge Planning   Living Arrangements Spouse/significant other   Support Systems Spouse/significant other;Family members   Assistance Needed None   Type of Residence Private residence   Number of Stairs to Enter Residence 0   Number of Stairs Within Residence 0   Do you have animals or pets at home? Yes   Type of Animals or Pets 1 cat   Who is requesting discharge planning? Provider   Home or Post Acute Services None   Expected Discharge Disposition Home   Does the patient need discharge transport arranged? No   Financial Resource Strain   How hard is it for you to pay for the very basics like food, housing, medical care, and heating? Not hard   Housing Stability   In the last 12 months, was there a time when you were not able to pay the mortgage or rent on time? N   In the past 12 months, how many times have you moved where you were living? 0   At any time in the past 12 months, were you homeless or living in a shelter (including now)? N   Transportation Needs   In the past 12 months, has lack of transportation kept you from medical appointments or from getting medications? no   In the past 12 months, has lack of transportation kept you from meetings, work, or from getting things needed for daily living? No     Care Transitions: Patient reviewed in care round meeting this AM and is medically ready for discharge today. Met with patient at bedside. Role of TCC explained. Demographics and contacts verified. PCP is Lynda Ibarra. Preferred pharmacy is [facility]. Denies any difficulty obtaining/affording medications. She is independent at home with all ADL's and drives self. She has a rollator and cane that she uses occasionally. Denies the need for any other DME equipment. States her last Chemo treatment was in 2020 but she still has ongoing health issues from treatments. [facility] 22/17 per nursing. Discharge plan is to return home, denies needs or community services at this time. Sister to  transport home when discharged. No need anticipated. Care team available upon request. Miya Hernandez RN/TCC

## 2025-05-13 NOTE — TREATMENT PLAN
[facility] [place]  [facility]    Cardiology Sign-Off Note           Discharge Medications: Continue current cardiac medications at current doses, unlimited duration.          Follow-up Imaging, Testing: No additional outpatient cardiac testing indicated at this time.          Follow-up Appointments: Our office will contact the patient to arrange follow-up in the cardiology clinic in 2 to 4 weeks.         Additional Instructions Reviewed with Patient and/or Family: Compliance with follow-up. Compliance with medications.     Hospital length of stay day: 1    ASSESSMENT AND PLAN  Pulmonary embolism  - Secondary prevention with Eliquis 5 mg twice a day  - Lower extremity venous duplex studies negative for DVT  - Echo negative for right heart strain.  Preserved LVEF  - Follow-up in the outpatient cardiology clinic    Thank you for this interesting clinical case and allowing me to participate in the care of this patient.  Please reach out to one of our cardiology providers via [employer] secure chat if you have any questions or if you need any clarifications regarding this patient's care.    **Disclaimer: This note was dictated by speech recognition, and every effort has been made to prevent any error in transcription, however minor errors may be present**  _________________________________________________________  Tyrone Talley, MSN, CNP, ACNPC, CCRN  Advanced Practice Provider, Nurse Practitioner  Division of Cardiovascular Medicine  [facility] [place]  [facility]

## 2025-05-13 NOTE — PROGRESS NOTES
Subjective Data:  Patient reports feeling well, no new adverse events overnight. Patient denies any chest pain, shortness of breath, palpitations, dizziness or syncope. Patient is hemodynamically stable.     Overnight Events:    No     Objective Data:  Last Recorded Vitals:  Vitals:    05/13/25 0500 05/13/25 0600 05/13/25 0700 05/13/25 0900   BP: 146/80 142/77 147/71    BP Location:   Left arm    Patient Position:   Sitting    Pulse: 77 80 74 77   Resp: 18 (!) 29 18    Temp:   35.9 °C (96.6 °F)    TempSrc:   Temporal    SpO2:   97%    Weight:       Height:           Last Labs:  CBC - 5/13/2025:  4:37 AM  5.1 12.0 194    36.2      CMP - 5/13/2025:  4:37 AM  8.1 6.1 12 --- 0.7   _ 4.4 3 68      PTT - 5/12/2025:  1:44 PM  1.0   10.7 31     TROPHS   Date/Time Value Ref Range Status   05/12/2025 01:44 PM 5 0 - 13 ng/L Final     BNP   Date/Time Value Ref Range Status   05/12/2025 04:34 PM 68 0 - 99 pg/mL Final   09/01/2021 12:40 PM 45 0 - 99 pg/mL Final     Comment:     .  <100 pg/mL - Heart failure unlikely  100-299 pg/mL - Intermediate probability of acute heart  .               failure exacerbation. Correlate with clinical  .               context and patient history.    >=300 pg/mL - Heart Failure likely. Correlate with clinical  .               context and patient history.  BNP testing is performed using different testing   methodology at [facility] than at other   [facility]. Direct result comparisons should   only be made within the same method.       HGBA1C   Date/Time Value Ref Range Status   09/06/2024 08:09 AM 5.2 see below % Final   09/06/2023 11:42 AM 5.6 % Final     LDLCALC   Date/Time Value Ref Range Status   09/06/2024 08:09 AM 67 <=99 mg/dL Final     Comment:                                 Near   Borderline      AGE      Desirable  Optimal    High     High     Very High     0-19 Y     0 - 109     ---    110-129   >/= 130     ----    20-24 Y     0 - 119     ---    120-159   >/= 160      ----      >24 Y     0 -  99   100-129  130-159   160-189     >/=190     09/06/2023 11:44 AM 63 mg/dL Final     VLDL   Date/Time Value Ref Range Status   09/06/2024 08:09 AM 19 0 - 40 mg/dL Final   09/06/2023 11:44 AM 18 mg/dL Final   09/11/2020 07:30 AM 26 0 - 40 mg/dL Final      Last I/O:  I/O last 3 completed shifts:  In: 340.8 (3.3 mL/kg) [I.V.:340.8 (3.3 mL/kg)]  Out: - (0 mL/kg)   Weight: 102.6 kg     Past Cardiology Tests (Last 3 Years):  EKG:  ECG 12 lead (Clinic Performed) 03/11/2025    Echo:  Transthoracic Echo Complete 05/13/2025    Ejection Fractions:  EF   Date/Time Value Ref Range Status   05/13/2025 05:43 AM 55 %      Cath:  No results found for this or any previous visit from the past 1095 days.    Stress Test:  No results found for this or any previous visit from the past 1095 days.    Cardiac Imaging:  No results found for this or any previous visit from the past 1095 days.      Inpatient Medications:  Scheduled Medications[1]  PRN Medications[2]  Continuous Medications[3]    Physical Exam:  General: alert, oriented and in no acute distress  HEENT: NC/AT; EOMI; PERRLA, external ear is normal  Neck: supple; trachea midline; no masses; no JVD  Chest: clear breath sounds bilaterally; no wheezing  Cardio: regular rhythm, S1S2 normal, no murmurs  Abdomen: Soft, non-tender, non-distension, no organomegaly  Extremities: no clubbing/cyanosis/edema  Neuro: Grossly intact     Psychiatric: Normal mood and affect      Assessment/Plan     Mrs. Nusrat Irizarry is a 64 y.o. non-smoker female being consulted by the Cardiology team for pulmonary embolism. Patient with past medical history significant for ostium secundum ASD status post closure with 35 mm Helex device March 2013, hypertension, hyperlipidemia, hypothyroidism, Parkinson's disease, breast cancer, prior history of cardiotoxic chemotherapy, S/P RT on Letrozole, hx of Chronic pericardial effusion. She presented to ED [facility] on 05/12/2025 complaining  of fatigue and malaise after chemotherapy. CT showed multiple new pulmonary nodules, then CTA showed acute PE with signs of mild R heart strain. She denied chest pain, shortness of breath, palpitations, leg edema, lightheadedness, headaches, fever, chills, orthopnea, paroxysmal nocturnal dyspnea or syncope. Trop 5. BNP 68. She was started on IV heparin and admitted for clinical compensation.     Assessment     # Pulmonary embolism - Per discussion with PERT team  - BNP 68  - Trop 5  - CTA chest (05/12/2025) showing acute multilobar pulmonary artery emboli with evidence of right ventricular heart strain.  - Underlying malignancy may be associated with PE.  - Patient has been admitted and started on IV Heparin drip. Would suggest to switch it to DOAC before going home.  - Echocardiogram and US duplex LE pending.  - Follow up in Cardiology clinic.     # Hypertension  - Controlled blood pressure.  - Patient counseled to keep a healthy lifestyle including regular exercise and low-sodium diet.  - Recommended home blood pressure monitoring.  - Goal of BP < 130/80mmHg.     # Hyperlipidemia  - Controlled by PCP.  - Keep home medication with Atorvastatin 40mg daily.  - Counseled on healthy diet and regular exercise.      # Hypothyroidism  - Keep Levothyroxine 50mcg daily.      This critically ill patient continues to be at-risk for clinically significant deterioration / failure due to the above mentioned dysfunctional, unstable organ systems.  I have personally identified and managed all complex critical care issues to prevent aforementioned clinical deterioration.  Critical care time is spent at bedside and/or the immediate area and has included, but is not limited to, the review of diagnostic tests, labs, radiographs, serial assessments of hemodynamics, respiratory status, ventilatory management, and family updates.  Time spent in procedures and teaching are reported separately.     Critical care time: 32 minutes      Peripheral IV 05/12/25 20 G Left Antecubital (Active)   Site Assessment Clean;Dry;Intact 05/13/25 0900   Dressing Status Clean;Dry 05/13/25 0900   Number of days: 1     Code Status:  Full Code    Jorden Barrera MD  Cardiology        [1]   Scheduled medications   Medication Dose Route Frequency    amantadine  100 mg oral BID    aspirin  81 mg oral Daily    atorvastatin  40 mg oral Nightly    carbidopa-levodopa  2 tablet oral 5 times per day    entacapone  200 mg oral 4x daily    gabapentin  300 mg oral 4x daily    lactulose  10 g oral Nightly    letrozole  2.5 mg oral Daily    levothyroxine  50 mcg oral Once per day on Sunday Tuesday Thursday Saturday    [START ON 5/14/2025] levothyroxine  75 mcg oral Every Mon/Wed/Fri    perflutren protein A microsphere  0.5 mL intravenous Once in imaging    polyethylene glycol  17 g oral Daily    sulfur hexafluoride microsphr  2 mL intravenous Once in imaging   [2]   PRN medications   Medication    acetaminophen    Or    acetaminophen    Or    acetaminophen    acetaminophen    Or    acetaminophen    Or    acetaminophen    diclofenac sodium    heparin    nitroglycerin    ondansetron ODT    Or    ondansetron   [3]   Continuous Medications   Medication Dose Last Rate    heparin  0-4,500 Units/hr 1,700 Units/hr (05/13/25 0157)

## 2025-05-13 NOTE — CARE PLAN
The clinical goals for the shift include Heparin theraputic through shift      Problem: Pain - Adult  Goal: Verbalizes/displays adequate comfort level or baseline comfort level  Outcome: Progressing     Problem: Safety - Adult  Goal: Free from fall injury  Outcome: Progressing     Problem: Discharge Planning  Goal: Discharge to home or other facility with appropriate resources  Outcome: Progressing     Problem: Chronic Conditions and Co-morbidities  Goal: Patient's chronic conditions and co-morbidity symptoms are monitored and maintained or improved  Outcome: Progressing     Problem: Nutrition  Goal: Nutrient intake appropriate for maintaining nutritional needs  Outcome: Progressing     Problem: Fall/Injury  Goal: Not fall by end of shift  Outcome: Progressing  Goal: Be free from injury by end of the shift  Outcome: Progressing

## 2025-05-13 NOTE — DISCHARGE INSTRUCTIONS
"Pulmonary embolism - Discharge instructions    The Basics  Written by the doctors and editors at [facility]  What are discharge instructions?  Discharge instructions are information about how to take care of yourself after getting medical care for a health problem.  What is a pulmonary embolism?  A pulmonary embolism (\"PE\") is a blockage in 1 or more of the blood vessels that supply blood to the lungs. Most often, these blockages are caused by blood clots that form elsewhere and then travel to the lungs.  Why are blood clots dangerous?  If a blood clot forms or gets stuck inside a blood vessel, it can clog the vessel and keep blood from getting where it needs to go. When that happens in the lungs, the lungs can get damaged. Having blocked arteries in the lung can also make it hard to breathe and can even lead to death. Occasionally, it can also lead to increase pressure in the lungs or \"pulmonary hypertension.\"  HOW DO I CARE FOR MYSELF AT HOME?  Ask the doctor or nurse what you should do when you go home. Make sure that you understand exactly what you need to do to care for yourself. Ask questions if there is anything you do not understand.  You should also:  ? Take all of your medicines exactly as your doctor tells you to. Blood clots are treated with medicines called \"anticoagulants.\" These are sometimes called \"blood thinners.\"  ? Follow your doctor's instructions about diet and your medicines. Depending on which medicines you take, you might need to pay special attention to what you eat.  ? Make sure that you know what other medicines are safe to take. Certain other medicines can affect the way that anticoagulants work.  ? Make sure that you take your medicine exactly as instructed. It's very important to take the right dose. Too much can cause bleeding, and too little can allow your blood to clot.  ? Try to take your medicine at the same time each day (or at the same times, if you take it twice a day). If you " "forget or miss a dose, call your doctor or pharmacist to find out what to do.  ? When you start taking the medicine, you might need to have your blood tested.  ? If you take warfarin (brand name: Jantoven), you will need regular blood tests to check how your blood is clotting. This is important to make sure that you get the correct dose of warfarin for you.  ? Get your medicines refilled before you run out.  ? Avoid things that could increase your risk of injury or bleeding. For example, avoid sports where you could get hurt, and be very careful if you need to use sharp tools.  ? Lower your risk of getting another blood clot. Your risk is higher if you miss a dose of medicine or stop taking your medicine. Other ways to lower your risk include:  ? Try to be active - Walk, garden, or do something active for 30 minutes or more on most days of the week.  ? If you spend a lot of time sitting, try to stand up and walk around at least every 1 to 2 hours. Avoid sitting with your legs crossed. If you can't get up, bend and straighten your knees and ankles and wiggle your toes.  ? When driving or traveling in a car, try to stop every 1 to 2 hours to get out and stretch your legs.  ? If you travel by plane, move your arms and legs regularly. Walk around and stretch your legs at least once every hour.  ? Wear elastic or compression stockings. This can improve blood flow in your legs.  ? If you smoke, it's very important to try to quit. Your doctor or nurse can help you if you are having trouble quitting.  In some cases, people get something called an \"inferior vena cava filter\" (\"IVC filter\"). If you had surgery to get an IVC filter, your doctor or nurse will tell you how to take care of your incision. Most people who have an IVC filter are not taking anticoagulants. But occasionally, both an anticoagulant and an IVC filter are used to treat PE.  What follow-up care do I need?  Your doctor or nurse will tell you if you need to " make a follow-up appointment. If so, make sure that you know when and where to go. Have your blood tested when ordered.  When should I call the doctor?  Call for emergency help right away (in the [place] and [place], call 9-1-1) if:  ? You feel short of breath or have trouble breathing.  ? You have sharp or severe chest pain when you breathe.  ? You are coughing up blood.  ? You have signs of stroke, like sudden:  ? Numbness or weakness of the face, arm, or leg, especially on 1 side of the body  ? Confusion, or trouble speaking or understanding  ? Trouble seeing in 1 or both eyes  ? Trouble walking, dizziness, or loss of balance or coordination  ? Severe headache with no known cause  Call the doctor or nurse for advice if:  ? You notice new or worsened swelling in your arm or leg.  ? Your arm or leg becomes numb or very painful to touch.  ? Your leg hurts when you walk, or your arm hurts when you move it.  ? Your arm or leg turns blue or gray.  ? You have discomfort when you take a deep breath.  ? You have any of these signs of abnormal bleeding:  ? Nausea  ? Blood in your bowel movements or dark-colored bowel movements  ? Headaches or dizziness  ? Nosebleeds or any other bleeding that does not stop  ? Dark red or brown urine  You should also tell your doctor if you:  ? Have an injury such as a fall where you hit your head  ? Bleed from your gums after brushing your teeth  ? Have heavy menstrual periods or bleeding between periods  ? Have more bruising than usual after a minor injury  ? Have diarrhea, vomit, or are unable to eat for more than 24 hours  ? Have a fever (temperature higher than 100.4°F or 38°C)  ? Cannot take your medicine for any reason  All topics are updated as new evidence becomes available and our peer review process is complete.  This topic retrieved from [employer] on: Mar 03, 2025.  Topic 427747 Version 2.0  Release: 33.1.2 - C33.61  © 2025 [employer]Date, Inc. and/or its affiliates. All rights reserved.

## 2025-05-14 ENCOUNTER — PATIENT OUTREACH (OUTPATIENT)
Dept: PRIMARY CARE | Facility: CLINIC | Age: 65
End: 2025-05-14
Payer: MEDICARE

## 2025-05-14 NOTE — PROGRESS NOTES
Discharge Facility: [facility]  Discharge Diagnosis: Acute pulmonary embolism with acute cor pulmonale  Admission Date: 5/12/2025  Discharge Date: 5/13/2025    PCP Appointment Date:  -5/22/2025 1300    Specialist Appointment Date:   -5/23/2024 0930 hem/onc  -5/23/2025 1400 cardiology     Hospital Encounter and Summary Linked: Yes    ED to Hosp-Admission (Discharged) with Johnathan Urrutia MD; Luke Mari DO (05/12/2025)     See discharge assessment below for further details    Wrap Up  Wrap Up Additional Comments: Pt was admitted to [facility] 5/12-5/13/2025 for PE. Pt reports feeling better since discharge. Pt discharged with prescription for eliquis; she denies questions or issues regarding medication. Pt reports understanding of discharge instructions. Verified pt upcoming PCP appt 5/22/2025 1300. Verified pt cardiology and hematology appts scheduled for 5/23/2025. Pt denies any questions, needs, or concerns. She is encouraged to call if questions or needs arise. (5/14/2025 10:11 AM)  Call End Time: 1012 (5/14/2025 10:11 AM)    Engagement  Call Start Time: 1009 (5/14/2025 10:11 AM)    Medications  Medications reviewed with patient/caregiver?: Yes (new prescription reviewed; eliquis) (5/14/2025 10:11 AM)  Is the patient having any side effects they believe may be caused by any medication additions or changes?: No (5/14/2025 10:11 AM)  Does the patient have all medications ordered at discharge?: Yes (5/14/2025 10:11 AM)  Care Management Interventions: No intervention needed (5/14/2025 10:11 AM)  Is the patient taking all medications as directed (includes completed medication regime)?: Yes (5/14/2025 10:11 AM)    Appointments  Does the patient have a primary care provider?: Yes (5/14/2025 10:11 AM)  Care Management Interventions: Verified appointment date/time/provider (5/14/2025 10:11 AM)  Has the patient kept scheduled appointments due by today?: Yes (5/14/2025 10:11 AM)    Self Management  Has  home health visited the patient within 72 hours of discharge?: Not applicable (5/14/2025 10:11 AM)    Patient Teaching  Does the patient have access to their discharge instructions?: Yes (5/14/2025 10:11 AM)  Care Management Interventions: Reviewed instructions with patient (5/14/2025 10:11 AM)  What is the patient's perception of their health status since discharge?: Improving (5/14/2025 10:11 AM)  Is the patient/caregiver able to teach back the hierarchy of who to call/visit for symptoms/problems? PCP, Specialist, Home Health nurse, Urgent Care, ED, 911: Yes (5/14/2025 10:11 AM)

## 2025-05-19 ENCOUNTER — APPOINTMENT (OUTPATIENT)
Dept: HEMATOLOGY/ONCOLOGY | Facility: CLINIC | Age: 65
End: 2025-05-19
Payer: MEDICARE

## 2025-05-19 DIAGNOSIS — K59.01 SLOW TRANSIT CONSTIPATION: ICD-10-CM

## 2025-05-19 PROCEDURE — RXMED WILLOW AMBULATORY MEDICATION CHARGE

## 2025-05-20 PROCEDURE — RXMED WILLOW AMBULATORY MEDICATION CHARGE

## 2025-05-20 RX ORDER — LACTULOSE 10 G/15ML
SOLUTION ORAL; RECTAL NIGHTLY
Qty: 473 ML | Refills: 3 | Status: SHIPPED | OUTPATIENT
Start: 2025-05-20

## 2025-05-22 ENCOUNTER — APPOINTMENT (OUTPATIENT)
Dept: PRIMARY CARE | Facility: CLINIC | Age: 65
End: 2025-05-22
Payer: MEDICARE

## 2025-05-22 ENCOUNTER — PHARMACY VISIT (OUTPATIENT)
Dept: PHARMACY | Facility: CLINIC | Age: 65
End: 2025-05-22
Payer: MEDICARE

## 2025-05-22 VITALS
BODY MASS INDEX: 33.18 KG/M2 | HEART RATE: 73 BPM | DIASTOLIC BLOOD PRESSURE: 85 MMHG | WEIGHT: 224 LBS | SYSTOLIC BLOOD PRESSURE: 147 MMHG | HEIGHT: 69 IN

## 2025-05-22 DIAGNOSIS — E66.09 CLASS 1 OBESITY DUE TO EXCESS CALORIES WITH SERIOUS COMORBIDITY AND BODY MASS INDEX (BMI) OF 33.0 TO 33.9 IN ADULT: ICD-10-CM

## 2025-05-22 DIAGNOSIS — I10 BENIGN ESSENTIAL HYPERTENSION: ICD-10-CM

## 2025-05-22 DIAGNOSIS — G20.B1 PARKINSON'S DISEASE WITH DYSKINESIA, UNSPECIFIED WHETHER MANIFESTATIONS FLUCTUATE: ICD-10-CM

## 2025-05-22 DIAGNOSIS — I31.39 PERICARDIAL EFFUSION (HHS-HCC): ICD-10-CM

## 2025-05-22 DIAGNOSIS — E66.811 CLASS 1 OBESITY DUE TO EXCESS CALORIES WITH SERIOUS COMORBIDITY AND BODY MASS INDEX (BMI) OF 33.0 TO 33.9 IN ADULT: ICD-10-CM

## 2025-05-22 DIAGNOSIS — R91.1 NODULE OF LEFT LUNG: ICD-10-CM

## 2025-05-22 DIAGNOSIS — Z09 HOSPITAL DISCHARGE FOLLOW-UP: Primary | ICD-10-CM

## 2025-05-22 DIAGNOSIS — I26.99 MULTIPLE PULMONARY EMBOLI (MULTI): ICD-10-CM

## 2025-05-22 DIAGNOSIS — Z85.3 PERSONAL HISTORY OF BREAST CANCER: ICD-10-CM

## 2025-05-22 PROCEDURE — 1036F TOBACCO NON-USER: CPT | Performed by: PHYSICIAN ASSISTANT

## 2025-05-22 PROCEDURE — 1160F RVW MEDS BY RX/DR IN RCRD: CPT | Performed by: PHYSICIAN ASSISTANT

## 2025-05-22 PROCEDURE — 1159F MED LIST DOCD IN RCRD: CPT | Performed by: PHYSICIAN ASSISTANT

## 2025-05-22 PROCEDURE — 3079F DIAST BP 80-89 MM HG: CPT | Performed by: PHYSICIAN ASSISTANT

## 2025-05-22 PROCEDURE — 99495 TRANSJ CARE MGMT MOD F2F 14D: CPT | Performed by: PHYSICIAN ASSISTANT

## 2025-05-22 PROCEDURE — 3077F SYST BP >= 140 MM HG: CPT | Performed by: PHYSICIAN ASSISTANT

## 2025-05-22 PROCEDURE — 3008F BODY MASS INDEX DOCD: CPT | Performed by: PHYSICIAN ASSISTANT

## 2025-05-22 ASSESSMENT — ENCOUNTER SYMPTOMS
FREQUENCY: 0
SLEEP DISTURBANCE: 0
FLANK PAIN: 0
DIARRHEA: 0
CHILLS: 0
DIZZINESS: 0
BACK PAIN: 0
TREMORS: 0
VOMITING: 0
NECK PAIN: 0
SORE THROAT: 0
ABDOMINAL PAIN: 0
BRUISES/BLEEDS EASILY: 0
HEADACHES: 0
CHEST TIGHTNESS: 0
COUGH: 0
FATIGUE: 1
SINUS PAIN: 0
RHINORRHEA: 0
FEVER: 0
NAUSEA: 0
SHORTNESS OF BREATH: 0
PALPITATIONS: 0
NUMBNESS: 0
CONSTIPATION: 0
EYE DISCHARGE: 0
WOUND: 0
CONFUSION: 0
EYE REDNESS: 0
WHEEZING: 0

## 2025-05-22 NOTE — PROGRESS NOTES
Subjective   Patient ID: Nusrat Irizarry is a 64 y.o. female who presents for Hospital Follow-up (HOSPITAL F/U FOR BLOOD CLOTS)    HPI    Patient presents today for    Hops discharge follow up [facility]   5/12/25 through 5/13/25 for acute pulm embolism with acute cor pulmonale, unspec pulm embolism   She is on eliquis and cont to follow with heme   She was advised not to take ibuprofen /NSAID   She was advised to follow with cardio and visit is set for tomorrow   She was advised to follow with heme and visit is set for tomorrow   Overall she is feeling well and denies concerns  I note BP was high in the hosp and today but prior readings are typically <120/80.  I am hopefully this will dec within a few weeks with PE treatment but she will see cardio tomorrow  she was encouraged to check her BP weekly the next month and call if still elevated      med check   hypotension -stable   L shoulder/back pain -previously following with ortho  hypothyroid - on meds   vit D -  on meds weekly - stop march 2025   Hyperchol - on meds   PD - on meds   B12 - on supplement   Breast cancer - onc - transferred to    Stable angina/esophageal spasm?           Preventative Testing   Mammo - 5/12/25  DEXA- april 2024 osteopenia   colonoscopy nov 2024 - dr chun - repeat in 5 years ?   PAP - 2018 - hx of hysterectomy   CT chest may 12 2025 and then CTA done in the hosp may  May 12, 2025- PE   Depression screen - PHQ2 NEG March 2025   Fall -NEG March 2025         Problem List[1]    Review of Systems   Constitutional:  Positive for fatigue. Negative for chills and fever.   HENT:  Negative for congestion, rhinorrhea, sinus pain, sore throat and tinnitus.    Eyes:  Negative for discharge, redness and visual disturbance.   Respiratory:  Negative for cough, chest tightness, shortness of breath and wheezing.    Cardiovascular:  Negative for chest pain, palpitations and leg swelling.   Gastrointestinal:  Negative for abdominal pain, constipation,  "diarrhea, nausea and vomiting.   Endocrine: Negative for cold intolerance and heat intolerance.   Genitourinary:  Negative for flank pain, frequency and urgency.   Musculoskeletal:  Negative for back pain, gait problem and neck pain.   Skin:  Negative for rash and wound.   Neurological:  Negative for dizziness, tremors, syncope, numbness and headaches.   Hematological:  Does not bruise/bleed easily.   Psychiatric/Behavioral:  Negative for confusion, sleep disturbance and suicidal ideas.        Medical History[2]    Surgical History[3]    Family History[4]    Social History[5]    Allergies[6]    Current Medications[7]    Objective   /85   Pulse 73   Ht 1.753 m (5' 9\")   Wt 102 kg (224 lb)   LMP  (LMP Unknown)   BMI 33.08 kg/m²     Physical Exam  Vitals reviewed.   Constitutional:       Appearance: Normal appearance. She is obese.   HENT:      Head: Normocephalic.      Right Ear: External ear normal.      Left Ear: External ear normal.      Nose: Nose normal. No congestion or rhinorrhea.      Mouth/Throat:      Mouth: Mucous membranes are moist.   Eyes:      Extraocular Movements: Extraocular movements intact.      Conjunctiva/sclera: Conjunctivae normal.      Pupils: Pupils are equal, round, and reactive to light.   Cardiovascular:      Rate and Rhythm: Normal rate and regular rhythm.      Pulses: Normal pulses.   Pulmonary:      Effort: Pulmonary effort is normal.      Breath sounds: Normal breath sounds.   Abdominal:      General: Bowel sounds are normal.      Palpations: Abdomen is soft.      Tenderness: There is no abdominal tenderness. There is no right CVA tenderness or left CVA tenderness.   Musculoskeletal:         General: No tenderness. Normal range of motion.      Cervical back: Normal range of motion and neck supple. No tenderness.   Skin:     General: Skin is warm and dry.   Neurological:      General: No focal deficit present.      Mental Status: She is alert and oriented to person, place, " and time.   Psychiatric:         Mood and Affect: Mood normal.         Behavior: Behavior normal.         Testing   Reviewed hosp notes   Reviewed CT and CTA     Impression    MDM    1) COMPLEXITY: 1 OR MORE CHRONIC CONDITION WITH EXACERBATION, OR PROGRESSION OR SIDE EFFECT OF TREATMENT ADDRESSED  2)DATA: TESTS INTERPRETED AND OR ORDERED, TOOK INDEPENDENT HISTORY OR RECORDS REVIEWED  3)RISK: MODERATE RISK DUE TO NATURE OF MEDICAL CONDITIONS/COMORBIDITY OR MEDICATIONS ORDERED OR SURGICAL OR PROCEDURE REFERRAL, .     Reviewed labs and Testing on file   Patient to follow diet low in cholesterol, fat, and sodium.    Patient is advised to increase Exercise.  Patient is recommended to lose weight.  Reviewed Meds and discussed common side effects  Continue as directed   Patient is strongly advised to be compliant with recommendations.    Return to Clinic sooner if needed.  Patient denies further questions/concerns at this time     Assessment/Plan   Problem List Items Addressed This Visit           ICD-10-CM    Pericardial effusion ([facility]-HCC) I31.39    Parkinson's disease G20.A1    Class 1 obesity due to excess calories with serious comorbidity and body mass index (BMI) of 33.0 to 33.9 in adult E66.811, E66.09, Z68.33    Nodule of left lung R91.1    Benign essential hypertension I10    Personal history of breast cancer Z85.3    Multiple pulmonary emboli (Multi) I26.99     Other Visit Diagnoses         Codes      Hospital discharge follow-up    -  Primary Z09             FU as before in Sept        [1]   Patient Active Problem List  Diagnosis    Vitamin D deficiency    Tremors of nervous system    Spinal stenosis, lumbar region with neurogenic claudication    Secundum ASD ([facility]-HCC)    RUQ discomfort    Polyarthropathy    Joint effusion of lower extremity    Pericarditis ([facility]-HCC)    Pericardial effusion ([facility]-HCC)    Parkinson's disease    Pain of left hip joint    Shoulder pain, bilateral    Pain in left shoulder    Left  elbow pain    Osteopenia    Orthostatic hypotension    Class 1 obesity due to excess calories with serious comorbidity and body mass index (BMI) of 33.0 to 33.9 in adult    Numbness and tingling in both hands    Nodule of left lung    Neuropathy    Mitral valve prolapse    Lumbar arthropathy    Low hemoglobin and low hematocrit    Liver lesion    Pain of left hand    Pain of finger of left hand    Left wrist pain    Left knee pain    Kidney lesion    Glucose intolerance (impaired glucose tolerance)    Hypothyroidism due to acquired atrophy of thyroid    Hypercholesterolemia with hypertriglyceridemia    Hypercholesteremia    History of carpal tunnel syndrome    Dizziness    Disc degeneration, lumbar    Closed fracture of shaft of fifth metacarpal bone    Closed fracture of head of radius    Chronic SI joint pain    Chronic radicular low back pain    Chronic low back pain    Chronic constipation    Carpal tunnel syndrome, left    Carpal tunnel syndrome of right wrist    Benign essential hypertension    Abdominal spasms    Abdominal bloating    Weakness    Ostium secundum atrial septal defect ([facility]-HCC)    Right shoulder pain    Calcific tendonitis of left shoulder    Calcific tendonitis of right shoulder    GERD (gastroesophageal reflux disease)    Neck pain    Postmenopausal bleeding    Recurrent syncope    Right heart enlargement    Personal history of breast cancer    Polyarthralgia    Malaise and fatigue    Primary osteoarthritis of both knees    Lumbar radiculopathy    Cluneal neuropathy   [2]   Past Medical History:  Diagnosis Date    Acute renal insufficiency 07/12/2023    Breast cancer     Disease of thyroid gland     Effusion of left knee 08/08/2023    Hx antineoplastic chemo May 2020    Other specified postprocedural states     History of Papanicolaou smear    Parkinson's disease (tremor, stiffness, slow motion, unstable posture) (Multi)     Personal history of colonic polyps     Personal history of colonic  polyps    Personal history of irradiation August 2020    Personal history of other diseases of the circulatory system     History of hypertension    Personal history of other endocrine, nutritional and metabolic disease     History of hypothyroidism    Personal history of other endocrine, nutritional and metabolic disease     History of hyperglycemia    Personal history of other malignant neoplasm of skin     History of malignant neoplasm of skin    Personal history of other medical treatment     History of screening mammography   [3]   Past Surgical History:  Procedure Laterality Date    APPENDECTOMY      Appendectomy    BREAST LUMPECTOMY Right     CARDIAC SURGERY  2013    REPAIR OF HOLE IN HEART    COLONOSCOPY  11/18/2019    NORMAL; H/O COLONIC POLYPS IN PAST-REPEAT IN 5 YEARS    CT ANGIO CORONARY ART WITH HEARTFLOW IF SCORE >30%  09/16/2021    CT HEART CORONARY ANGIOGRAM 9/16/2021 [facility] ANCILLARY LEGACY    HYSTERECTOMY  04/16/2018    [facility]    INCISIONAL BREAST BIOPSY      Incisional Breast Biopsy    INJECTION Left 08/16/2024    Left Sacroiliac Joint Injection    INJECTION Bilateral 09/27/2024    superior Cluneal nerve block    IR INJECTION EPIDURAL STEROID N/A 03/01/2024    L5-S1    IR INJECTION NERVE BLOCK Bilateral 12/15/2023    Bilat L2/3 and L3/4 MBB    LYMPHADENECTOMY      axillary    OOPHORECTOMY  4/16\2018    OTHER SURGICAL HISTORY  10/02/2018    Bunion Correction By Osborne Procedure    OTHER SURGICAL HISTORY  01/05/2023    Intra-articular corticosteroid injection    OTHER SURGICAL HISTORY  01/21/2022    Epidural steroid injection    OTHER SURGICAL HISTORY  12/08/2021    Radiofrequency ablation    OTHER SURGICAL HISTORY  08/23/2021    Medial branch block    OTHER SURGICAL HISTORY  09/01/2021    Carpal tunnel surgery    OTHER SURGICAL HISTORY  09/23/2021    Medial branch block    OTHER SURGICAL HISTORY  06/17/2022    Epidural steroid injection    OTHER SURGICAL HISTORY  11/17/2022     Epidural steroid injection    RADIOFREQUENCY ABLATION Bilateral 06/28/2024    Bilat L3-5 MB RFA    RADIOFREQUENCY ABLATION Bilateral 10/25/2024    Bilat Superior Cluneal RFA    RADIOFREQUENCY ABLATION Bilateral 03/12/2025    Bilat L4/5-L5/S1 RFA    SACROILIAC JOINT INJECTION Right 04/26/2024    Rt SIJ    TONSILLECTOMY  1968    Tonsillectomy With Adenoidectomy    TUBAL LIGATION  1993    Tubal Ligation   [4]   Family History  Problem Relation Name Age of Onset    Heart disease Mother      Heart failure Father      Hyperthyroidism Sister      Lung cancer Sister      Breast cancer Other GRANDPARENT    [5]   Social History  Tobacco Use    Smoking status: Never    Smokeless tobacco: Never   Vaping Use    Vaping status: Never Used   Substance Use Topics    Alcohol use: Yes     Comment: occasional    Drug use: Never   [6]   Allergies  Allergen Reactions    Bee Venom Protein (Honey Bee) Anaphylaxis    Zoledronic Acid Swelling   [7]   Current Outpatient Medications   Medication Sig Dispense Refill    amantadine (Symmetrel) 100 mg capsule Take 1 capsule by mouth two times a day. 60 capsule 11    apixaban (Eliquis) 5 mg (74 tabs) tablet Take 2 tablets (10 mg) by mouth 2 times a day for 7 days, THEN 1 tablet (5 mg) 2 times a day. 74 tablet 0    aspirin 81 mg chewable tablet Chew 1 tablet (81 mg) once daily.      atorvastatin (Lipitor) 40 mg tablet Take 1 tablet (40 mg) by mouth once daily. 90 tablet 3    carbidopa-levodopa (Sinemet)  mg tablet Take 2 tablets by mouth five times a day. 8am,12pm, 6pm and 8pm and last dose upon waking up middle of the night around MIDNIGHT-2AM 900 tablet 3    diclofenac sodium (Voltaren) 1 % gel Apply 2 grams 4x/daily to affected area-left knee 100 g 1    entacapone (Comtan) 200 mg tablet Take 1 tablet by mouth four times daily. Take with levodopa/carbidopa 120 tablet 11    gabapentin (Neurontin) 300 mg capsule Take 1 capsule (300 mg) by mouth 4 times a day. 120 capsule 1    lactulose  (Enulose) 20 gram/30 mL oral solution Take 15 mL by mouth once daily at bedtime. 473 mL 3    letrozole (Femara) 2.5 mg tablet Take 1 tablet (2.5 mg total) by mouth once daily. 90 tablet 2    levothyroxine (Synthroid, Levoxyl) 50 mcg tablet Take 1.5 tab(s) on Monday, Wednesday, and Friday and 1 tab daily on all other days 145 tablet 3    nitroglycerin (Nitrostat) 0.4 mg SL tablet Place 1 tablet (0.4 mg) under the tongue every 5 minutes if needed for chest pain. After the second dose call 911 25 tablet 1     No current facility-administered medications for this visit.      Hemigard Intro: Due to skin fragility and wound tension, it was decided to use HEMIGARD adhesive retention suture devices to permit a linear closure. The skin was cleaned and dried for a 6cm distance away from the wound. Excessive hair, if present, was removed to allow for adhesion.

## 2025-05-23 ENCOUNTER — OFFICE VISIT (OUTPATIENT)
Dept: HEMATOLOGY/ONCOLOGY | Facility: CLINIC | Age: 65
End: 2025-05-23
Payer: MEDICARE

## 2025-05-23 ENCOUNTER — PATIENT OUTREACH (OUTPATIENT)
Dept: PRIMARY CARE | Facility: CLINIC | Age: 65
End: 2025-05-23

## 2025-05-23 ENCOUNTER — APPOINTMENT (OUTPATIENT)
Dept: CARDIOLOGY | Facility: CLINIC | Age: 65
End: 2025-05-23
Payer: MEDICARE

## 2025-05-23 VITALS
OXYGEN SATURATION: 95 % | DIASTOLIC BLOOD PRESSURE: 78 MMHG | HEART RATE: 79 BPM | SYSTOLIC BLOOD PRESSURE: 119 MMHG | WEIGHT: 226.4 LBS | BODY MASS INDEX: 33.43 KG/M2 | TEMPERATURE: 96.1 F | RESPIRATION RATE: 16 BRPM

## 2025-05-23 VITALS
WEIGHT: 227.1 LBS | BODY MASS INDEX: 33.64 KG/M2 | HEIGHT: 69 IN | SYSTOLIC BLOOD PRESSURE: 102 MMHG | HEART RATE: 81 BPM | DIASTOLIC BLOOD PRESSURE: 60 MMHG | OXYGEN SATURATION: 95 %

## 2025-05-23 DIAGNOSIS — Q21.11 SECUNDUM ASD (HHS-HCC): ICD-10-CM

## 2025-05-23 DIAGNOSIS — I34.1 MITRAL VALVE PROLAPSE: ICD-10-CM

## 2025-05-23 DIAGNOSIS — R42 DIZZINESS: ICD-10-CM

## 2025-05-23 DIAGNOSIS — E78.00 HYPERCHOLESTEREMIA: ICD-10-CM

## 2025-05-23 DIAGNOSIS — I31.9 PERICARDITIS, UNSPECIFIED CHRONICITY, UNSPECIFIED TYPE (HHS-HCC): ICD-10-CM

## 2025-05-23 DIAGNOSIS — I26.99 MULTIPLE PULMONARY EMBOLI (MULTI): Primary | ICD-10-CM

## 2025-05-23 DIAGNOSIS — Q21.11: ICD-10-CM

## 2025-05-23 DIAGNOSIS — I26.99 OTHER PULMONARY EMBOLISM WITHOUT ACUTE COR PULMONALE, UNSPECIFIED CHRONICITY (MULTI): ICD-10-CM

## 2025-05-23 DIAGNOSIS — I95.1 ORTHOSTATIC HYPOTENSION: ICD-10-CM

## 2025-05-23 DIAGNOSIS — I31.39 PERICARDIAL EFFUSION (HHS-HCC): Primary | ICD-10-CM

## 2025-05-23 DIAGNOSIS — I10 BENIGN ESSENTIAL HYPERTENSION: ICD-10-CM

## 2025-05-23 PROCEDURE — 99215 OFFICE O/P EST HI 40 MIN: CPT | Performed by: INTERNAL MEDICINE

## 2025-05-23 PROCEDURE — 3074F SYST BP LT 130 MM HG: CPT | Performed by: INTERNAL MEDICINE

## 2025-05-23 PROCEDURE — 1159F MED LIST DOCD IN RCRD: CPT | Performed by: INTERNAL MEDICINE

## 2025-05-23 PROCEDURE — 99205 OFFICE O/P NEW HI 60 MIN: CPT | Performed by: INTERNAL MEDICINE

## 2025-05-23 PROCEDURE — 1125F AMNT PAIN NOTED PAIN PRSNT: CPT | Performed by: INTERNAL MEDICINE

## 2025-05-23 PROCEDURE — 3078F DIAST BP <80 MM HG: CPT | Performed by: INTERNAL MEDICINE

## 2025-05-23 ASSESSMENT — ENCOUNTER SYMPTOMS
WEAKNESS: 1
SLEEP DISTURBANCE: 0
BRUISES/BLEEDS EASILY: 1
SPEECH DIFFICULTY: 0
NECK STIFFNESS: 1
NERVOUS/ANXIOUS: 0
LIGHT-HEADEDNESS: 0
NUMBNESS: 1
JOINT SWELLING: 1
NAUSEA: 0
DIARRHEA: 0
APPETITE CHANGE: 0
SHORTNESS OF BREATH: 0
FREQUENCY: 0
TREMORS: 1
HEADACHES: 0
ARTHRALGIAS: 1
MYALGIAS: 1
COUGH: 0
UNEXPECTED WEIGHT CHANGE: 0
TROUBLE SWALLOWING: 1
VOMITING: 0
CONSTIPATION: 0
FATIGUE: 1
ABDOMINAL PAIN: 0
DYSURIA: 0

## 2025-05-23 ASSESSMENT — PAIN SCALES - GENERAL: PAINLEVEL_OUTOF10: 2

## 2025-05-23 NOTE — PATIENT INSTRUCTIONS
Reviewed labs and recent medical history.  Discussed her recent findings on her scan that demonstrate that she had multiple pulmonary emboli. This was life threatening and may need for her to continue the Eliquis long term  Reviewed with her about being on aspirin and Eliquis at the same time. She will check with Dr. Barrera about whether she should continue this regimen.  Will check to see if she has any genetic predisposition for her blood clots. Orders added.  Discussed getting a breast index test to help clarify whether she needs to stay on Femara longer than 5 years. Will place an order for this test to be completed on her pathology.  Dr. Bridges will call you with the results of her blood and pathology.  She will follow up with APRN In September at her usual time.

## 2025-05-23 NOTE — PROGRESS NOTES
"Chief Complaint   Patient presents with    Hospital Follow-up     PE     HPI:  I was requested by Dr. Ibarra to evaluate this patient in consultation for cardiac assessment.    Mrs. Nusrat Irizarry is a 64 y.o. non-smoker female with past medical history significant for pulmonary embolism, ostium secundum ASD status post closure with 35 mm Helex device March 2013, hypertension, hyperlipidemia, hypothyroidism, Parkinson's disease, breast cancer, prior history of cardiotoxic chemotherapy, S/P RT on Letrozole, hx of Chronic pericardial effusion. She presented to ED [facility] on 05/12/2025 complaining of fatigue and malaise after chemotherapy. CT showed multiple new pulmonary nodules, then CTA showed acute PE with signs of mild R heart strain. She denied chest pain, shortness of breath, palpitations, leg edema, lightheadedness, headaches, fever, chills, orthopnea, paroxysmal nocturnal dyspnea or syncope. Trop 5. BNP 68. She was started on IV heparin and admitted for clinical compensation. She has been discharged uneventfully.    Past Medical History  Medical History[1]    Past Surgical History  Surgical History[2]    Past Family History  Family History[3]    Allergy History  Allergies[4]    Past Social History  Social History[5]    Tobacco Use History[6]    Review of Systems:  A total of 12 systems have been reviewed and are negative except for the aforementioned findings described in HPI.     Objective Data:  Last Recorded Vitals:  Vitals:    05/23/25 1352   BP: 102/60   Pulse: 81   SpO2: 95%   Weight: 103 kg (227 lb 1.6 oz)   Height: 1.753 m (5' 9\")       Last Labs:  CBC - 5/13/2025:  4:37 AM  5.1 12.0 194    36.2      CMP - 5/13/2025:  4:37 AM  8.1 6.1 12 --- 0.7   _ 4.4 3 68      PTT - 5/12/2025:  1:44 PM  1.0   10.7 31     TROPHS   Date/Time Value Ref Range Status   05/12/2025 01:44 PM 5 0 - 13 ng/L Final     BNP   Date/Time Value Ref Range Status   05/12/2025 04:34 PM 68 0 - 99 pg/mL Final   09/01/2021 " 12:40 PM 45 0 - 99 pg/mL Final     Comment:     .  <100 pg/mL - Heart failure unlikely  100-299 pg/mL - Intermediate probability of acute heart  .               failure exacerbation. Correlate with clinical  .               context and patient history.    >=300 pg/mL - Heart Failure likely. Correlate with clinical  .               context and patient history.  BNP testing is performed using different testing   methodology at [facility] than at other   [facility]. Direct result comparisons should   only be made within the same method.       HGBA1C   Date/Time Value Ref Range Status   09/06/2024 08:09 AM 5.2 see below % Final   09/06/2023 11:42 AM 5.6 % Final     LDLCALC   Date/Time Value Ref Range Status   09/06/2024 08:09 AM 67 <=99 mg/dL Final     Comment:                                 Near   Borderline      AGE      Desirable  Optimal    High     High     Very High     0-19 Y     0 - 109     ---    110-129   >/= 130     ----    20-24 Y     0 - 119     ---    120-159   >/= 160     ----      >24 Y     0 -  99   100-129  130-159   160-189     >/=190     09/06/2023 11:44 AM 63 mg/dL Final     VLDL   Date/Time Value Ref Range Status   09/06/2024 08:09 AM 19 0 - 40 mg/dL Final   09/06/2023 11:44 AM 18 mg/dL Final   09/11/2020 07:30 AM 26 0 - 40 mg/dL Final        Patient Medications:  Encounter Medications[7]    Physical Exam:  General: alert, oriented and in no acute distress  HEENT: NC/AT; EOMI; PERRLA, external ear is normal  Neck: supple; trachea midline; no masses; no JVD  Chest: clear breath sounds bilaterally; no wheezing  Cardio: regular rhythm, S1S2 normal, no murmurs  Abdomen: Soft, non-tender, non-distension, no organomegaly  Extremities: no clubbing/cyanosis/edema  Neuro: Grossly intact     Psychiatric: Normal mood and affect     Past Cardiology Results (Last 3 Years):  EKG:  ECG 12 lead (Clinic Performed) 03/11/2025    Echo:  Echo Results:  Transthoracic Echo (TTE) Complete With  Contrast 05/13/2025    [place] [address]  Phone [phone], Fax [phone]    TRANSTHORACIC ECHOCARDIOGRAM REPORT    Patient Name:       MISTY MONTGOMERY AYLIN     Reading Physician:    [id number] Francisca Hughes MD  Study Date:         5/13/2025            Ordering Provider:    [id number] LITOZULEMA PATA  MRN/PID:            [id number]             Fellow:  Accession#:         [id number]         Nurse:                Elsie Bermudez RN  Date of Birth/Age:  1960 / 64 years Sonographer:          Daniella Joyner  RVT, RCS  Gender Assigned at  F                    Additional Staff:  Birth:  Height:             175.26 cm            Admit Date:           5/12/2025  Weight:             104.33 kg            Admission Status:     Inpatient -  Routine  BSA / BMI:          2.19 m2 / 33.97      Department Location:  [address]  kg/m2  Blood Pressure: 129 /67 mmHg    Study Type:    TRANSTHORACIC ECHO (TTE) COMPLETE  Diagnosis/ICD: Other pulmonary embolism with acute cor pulmonale-I26.09  Indication:    PE  CPT Codes:     Echo Complete w Full [id number]    Patient History:  Pertinent History: Previous echo 10-10-22.    Study Detail: The following Echo studies were performed: 2D, M-Mode, Doppler and  color flow. Definity used as a contrast agent for endocardial  border definition. Total contrast used for this procedure was 2 mL  via IV push. A bubble study was not performed. The patient was  awake.      PHYSICIAN INTERPRETATION:  Left Ventricle: Left ventricular ejection fraction is low normal, by visual estimate at 55%. There is severe concentric left ventricular hypertrophy. There are no regional wall motion abnormalities. The left ventricular cavity size is normal. There is moderately increased septal and mildly increased posterior left ventricular wall thickness. Spectral Doppler shows a Grade I (impaired relaxation pattern) of left ventricular diastolic filling with normal  left atrial filling pressure.  Left Atrium: The left atrial size is mildly dilated. A bubble study using agitated saline was not performed.  Right Ventricle: The right ventricle is normal in size. There is normal right ventricular global systolic function.  Right Atrium: The right atrial size is normal.  Aortic Valve: The aortic valve is trileaflet. There is mild to moderate aortic valve thickening. There is evidence of mild aortic valve stenosis.  The aortic valve dimensionless index is 0.66. There is no evidence of aortic valve regurgitation. The peak instantaneous gradient of the aortic valve is 14 mmHg. The mean gradient of the aortic valve is 7 mmHg.  Mitral Valve: The mitral valve is mildly thickened. There is mild mitral annular calcification. There is trace to mild mitral valve regurgitation.  Tricuspid Valve: The tricuspid valve is structurally normal. There is mild tricuspid regurgitation. The Doppler estimated RVSP is mildly elevated right ventricular systolic pressure at 37.8 mmHg.  Pulmonic Valve: The pulmonic valve is structurally normal. There is no indication of pulmonic valve regurgitation.  Pericardium: No pericardial effusion noted.  Aorta: The aortic root is normal.      CONCLUSIONS:  1. Left ventricular ejection fraction is low normal, by visual estimate at 55%.  2. Spectral Doppler shows a Grade I (impaired relaxation pattern) of left ventricular diastolic filling with normal left atrial filling pressure.  3. There is normal right ventricular global systolic function.  4. Mildly elevated right ventricular systolic pressure.  5. Mild aortic valve stenosis.  6. There is moderately increased septal and mildly increased posterior left ventricular wall thickness.    QUANTITATIVE DATA SUMMARY:    2D MEASUREMENTS:             Normal Ranges:  Ao Root d:       2.80 cm     (2.0-3.7cm)  LAs:             3.90 cm     (2.7-4.0cm)  IVSd:            1.60 cm     (0.6-1.1cm)  LVPWd:           1.26 cm      (0.6-1.1cm)  LVIDd:           4.93 cm     (3.9-5.9cm)  LVIDs:           3.61 cm  LV Mass Index:   134.2 g/m2  LVEDV Index:     46.43 ml/m2  LV % FS          26.8 %      LEFT ATRIUM:                  Normal Ranges:  LA Vol A4C:        63.7 ml    (22+/-6mL/m2)  LA Vol A2C:        58.5 ml  LA Vol BP:         64.6 ml  LA Vol Index A4C:  29.1ml/m2  LA Vol Index A2C:  26.7 ml/m2  LA Vol Index BP:   29.5 ml/m2  LA Area A4C:       21.1 cm2  LA Area A2C:       19.1 cm2  LA Major Axis A4C: 5.9 cm  LA Major Axis A2C: 5.3 cm  LA Volume Index:   25.4 ml/m2  LA Vol A4C:        62.5 ml  LA Vol A2C:        55.7 ml  LA Vol Index BSA:  27.0 ml/m2      M-MODE MEASUREMENTS:         Normal Ranges:  AoV Exc:             1.70 cm (1.5-2.5cm)      AORTA MEASUREMENTS:         Normal Ranges:  AoV Exc:            1.70 cm (1.5-2.5cm)      LV SYSTOLIC FUNCTION:  Normal Ranges:  EF-A4C View:    54 % (>=55%)  EF-A2C View:    55 %  EF-Biplane:     54 %  EF-Visual:      55 %  LV EF Reported: 55 %      LV DIASTOLIC FUNCTION:           Normal Ranges:  MV Peak E:             1.15 m/s  (0.7-1.2 m/s)  MV Peak A:             1.57 m/s  (0.42-0.7 m/s)  E/A Ratio:             0.73      (1.0-2.2)  MV e'                  0.048 m/s (>8.0)  MV lateral e'          0.05 m/s  MV medial e'           0.04 m/s  E/e' Ratio:            24.03     (<8.0)      MITRAL VALVE:          Normal Ranges:  MV DT:        225 msec (150-240msec)      MITRAL INSUFFICIENCY:             Normal Ranges:  MR Vmax:              560.00 cm/s      AORTIC VALVE:                      Normal Ranges:  AoV Vmax:                1.87 m/s  (<=1.7m/s)  AoV Peak P.0 mmHg (<20mmHg)  AoV Mean P.0 mmHg  (1.7-11.5mmHg)  LVOT Max Alverto:            1.26 m/s  (<=1.1m/s)  AoV VTI:                 40.60 cm  (18-25cm)  LVOT VTI:                26.90 cm  LVOT Diameter:           1.90 cm   (1.8-2.4cm)  AoV Area, VTI:           1.88 cm2  (2.5-5.5cm2)  AoV Area,Vmax:           1.91 cm2   (2.5-4.5cm2)  AoV Dimensionless Index: 0.66      RIGHT VENTRICLE:  RV Basal 3.96 cm  RV Mid   3.20 cm  RV Major 6.4 cm  TAPSE:   21.4 mm      TRICUSPID VALVE/RVSP:          Normal Ranges:  Peak TR Velocity:     2.95 m/s  RV Syst Pressure:     38 mmHg  (< 30mmHg)      PULMONIC VALVE:          Normal Ranges:  PV Accel Time:  88 msec  (>120ms)  PV Max Alverto:     1.0 m/s  (0.6-0.9m/s)  PV Max P.4 mmHg      [id number] Francisca Hughes MD  Electronically signed on 2025 at 8:05:19 AM        ** Final **     Cath:  No results found for this or any previous visit from the past 1095 days.    CV NCDR CATHPCI V5 COLLECTION FORM   Stress Test:  No results found for this or any previous visit from the past 1095 days.    Cardiac Imaging:  No results found for this or any previous visit from the past 1095 days.       Assessment/Plan     Mrs. Nusrat Irizarry is a 64 y.o. non-smoker female with past medical history significant for pulmonary embolism, ostium secundum ASD status post closure with 35 mm Helex device 2013, hypertension, hyperlipidemia, hypothyroidism, Parkinson's disease, breast cancer, prior history of cardiotoxic chemotherapy, S/P RT on Letrozole, hx of Chronic pericardial effusion. She presented to ED [facility] on 2025 complaining of fatigue and malaise after chemotherapy. CT showed multiple new pulmonary nodules, then CTA showed acute PE with signs of mild R heart strain. She denied chest pain, shortness of breath, palpitations, leg edema, lightheadedness, headaches, fever, chills, orthopnea, paroxysmal nocturnal dyspnea or syncope. Trop 5. BNP 68. She was started on IV heparin and admitted for clinical compensation. She has been discharged uneventfully.    Assessment     # Pulmonary embolism - Per discussion with PERT team  - BNP 68  - Trop 5  - CTA chest (2025) showing acute multilobar pulmonary artery emboli with evidence of right ventricular heart strain.  - Underlying malignancy may be  associated with PE.  - Echocardiogram (05/13/2025):   1. Left ventricular ejection fraction is low normal, by visual estimate at 55%.   2. Spectral Doppler shows a Grade I (impaired relaxation pattern) of left ventricular diastolic filling with normal left atrial filling pressure.   3. There is normal right ventricular global systolic function.   4. Mildly elevated right ventricular systolic pressure.   5. Mild aortic valve stenosis.   6. There is moderately increased septal and mildly increased posterior left ventricular wall thickness.   - US duplex LE (05/13/2025):  Right Lower Venous: No evidence of acute deep vein thrombus visualized in the right lower extremity.  Left Lower Venous: No evidence of acute deep vein thrombus visualized in the left lower extremity.  - Keep DOAC - would suggest long-term use if no signs of bleeding.  - Follow up yearly     # Hypertension  - Controlled blood pressure.  - Patient counseled to keep a healthy lifestyle including regular exercise and low-sodium diet.  - Recommended home blood pressure monitoring.  - Goal of BP < 130/80mmHg.     # Hyperlipidemia  - Controlled by PCP.  - Keep home medication with Atorvastatin 40mg daily.  - Counseled on healthy diet and regular exercise.      # Hypothyroidism  - Keep Levothyroxine 50mcg daily.      We have discussed the most common side effects of the prescribed medications, indications, drug interactions, risks, complications, and alternatives of medications/therapeutics were explained and discussed. The patient has been requested to monitor closely for any untoward side effects or complications of medications. The patient has been strongly advised to be compliant with the recommendations, all the questions and concerns have been addressed. The patient has been also instructed to call, to return sooner or to go to the emergency department if symptoms persist or get worsen. The patient voiced understanding and denies any further questions  at this time.     This note was transcribed using the Dragon Dictation system. There may be grammatical, punctuation, or verbiage errors that occur with voice recognition programs.    Counseling greater than 50% of visit regarding all cardiac issues.    Thank you, Dr. Ibarra, for allowing me to participate in the care of this patient. Please do not hesitate to contact me with any further questions or concerns.    Jorden Barrera MD  Cardiology       [1]   Past Medical History:  Diagnosis Date    Acute renal insufficiency 07/12/2023    Breast cancer     Disease of thyroid gland     Effusion of left knee 08/08/2023    Hx antineoplastic chemo May 2020    Other specified postprocedural states     History of Papanicolaou smear    Parkinson's disease (tremor, stiffness, slow motion, unstable posture) (Multi)     Personal history of colonic polyps     Personal history of colonic polyps    Personal history of irradiation August 2020    Personal history of other diseases of the circulatory system     History of hypertension    Personal history of other endocrine, nutritional and metabolic disease     History of hypothyroidism    Personal history of other endocrine, nutritional and metabolic disease     History of hyperglycemia    Personal history of other malignant neoplasm of skin     History of malignant neoplasm of skin    Personal history of other medical treatment     History of screening mammography   [2]   Past Surgical History:  Procedure Laterality Date    APPENDECTOMY      Appendectomy    BREAST LUMPECTOMY Right     CARDIAC SURGERY  2013    REPAIR OF HOLE IN HEART    COLONOSCOPY  11/18/2019    NORMAL; H/O COLONIC POLYPS IN PAST-REPEAT IN 5 YEARS    CT ANGIO CORONARY ART WITH HEARTFLOW IF SCORE >30%  09/16/2021    CT HEART CORONARY ANGIOGRAM 9/16/2021 [facility] ANCILLARY LEGACY    HYSTERECTOMY  04/16/2018    [facility]    INCISIONAL BREAST BIOPSY      Incisional Breast Biopsy    INJECTION  Left 08/16/2024    Left Sacroiliac Joint Injection    INJECTION Bilateral 09/27/2024    superior Cluneal nerve block    IR INJECTION EPIDURAL STEROID N/A 03/01/2024    L5-S1    IR INJECTION NERVE BLOCK Bilateral 12/15/2023    Bilat L2/3 and L3/4 MBB    LYMPHADENECTOMY      axillary    OOPHORECTOMY  4/16\2018    OTHER SURGICAL HISTORY  10/02/2018    Bunion Correction By Osborne Procedure    OTHER SURGICAL HISTORY  01/05/2023    Intra-articular corticosteroid injection    OTHER SURGICAL HISTORY  01/21/2022    Epidural steroid injection    OTHER SURGICAL HISTORY  12/08/2021    Radiofrequency ablation    OTHER SURGICAL HISTORY  08/23/2021    Medial branch block    OTHER SURGICAL HISTORY  09/01/2021    Carpal tunnel surgery    OTHER SURGICAL HISTORY  09/23/2021    Medial branch block    OTHER SURGICAL HISTORY  06/17/2022    Epidural steroid injection    OTHER SURGICAL HISTORY  11/17/2022    Epidural steroid injection    RADIOFREQUENCY ABLATION Bilateral 06/28/2024    Bilat L3-5 MB RFA    RADIOFREQUENCY ABLATION Bilateral 10/25/2024    Bilat Superior Cluneal RFA    RADIOFREQUENCY ABLATION Bilateral 03/12/2025    Bilat L4/5-L5/S1 RFA    SACROILIAC JOINT INJECTION Right 04/26/2024    Rt SIJ    TONSILLECTOMY  1968    Tonsillectomy With Adenoidectomy    TUBAL LIGATION  1993    Tubal Ligation   [3]   Family History  Problem Relation Name Age of Onset    Heart disease Mother      Heart failure Father      Hyperthyroidism Sister      Lung cancer Sister      Breast cancer Other GRANDPARENT    [4]   Allergies  Allergen Reactions    Bee Venom Protein (Honey Bee) Anaphylaxis    Zoledronic Acid Swelling   [5]   Social History  Socioeconomic History    Marital status:    Tobacco Use    Smoking status: Never    Smokeless tobacco: Never   Vaping Use    Vaping status: Never Used   Substance and Sexual Activity    Alcohol use: Yes     Comment: occasional    Drug use: Never    Sexual activity: Defer     Social Drivers of Health      Financial Resource Strain: Low Risk  (5/13/2025)    Overall Financial Resource Strain (CARDIA)     Difficulty of Paying Living Expenses: Not hard at all   Food Insecurity: No Food Insecurity (5/12/2025)    Hunger Vital Sign     Worried About Running Out of Food in the Last Year: Never true     Ran Out of Food in the Last Year: Never true   Transportation Needs: No Transportation Needs (5/13/2025)    PRAPARE - Transportation     Lack of Transportation (Medical): No     Lack of Transportation (Non-Medical): No   Physical Activity: Inactive (5/12/2025)    Exercise Vital Sign     Days of Exercise per Week: 0 days     Minutes of Exercise per Session: 0 min   Intimate Partner Violence: Not At Risk (5/12/2025)    Humiliation, Afraid, Rape, and Kick questionnaire     Fear of Current or Ex-Partner: No     Emotionally Abused: No     Physically Abused: No     Sexually Abused: No   Housing Stability: Low Risk  (5/13/2025)    Housing Stability Vital Sign     Unable to Pay for Housing in the Last Year: No     Number of Times Moved in the Last Year: 0     Homeless in the Last Year: No   Recent Concern: Housing Stability - High Risk (5/12/2025)    Housing Stability Vital Sign     Unable to Pay for Housing in the Last Year: Yes     Number of Times Moved in the Last Year: 0     Homeless in the Last Year: No   [6]   Social History  Tobacco Use   Smoking Status Never   Smokeless Tobacco Never   [7]   Outpatient Encounter Medications as of 5/23/2025   Medication Sig Dispense Refill    amantadine (Symmetrel) 100 mg capsule Take 1 capsule by mouth two times a day. 60 capsule 11    apixaban (Eliquis) 5 mg (74 tabs) tablet Take 2 tablets (10 mg) by mouth 2 times a day for 7 days, THEN 1 tablet (5 mg) 2 times a day. 74 tablet 0    aspirin 81 mg chewable tablet Chew 1 tablet (81 mg) once daily.      atorvastatin (Lipitor) 40 mg tablet Take 1 tablet (40 mg) by mouth once daily. 90 tablet 3    carbidopa-levodopa (Sinemet)  mg tablet  Take 2 tablets by mouth five times a day. 8am,12pm, 6pm and 8pm and last dose upon waking up middle of the night around MIDNIGHT-2AM 900 tablet 3    diclofenac sodium (Voltaren) 1 % gel Apply 2 grams 4x/daily to affected area-left knee 100 g 1    entacapone (Comtan) 200 mg tablet Take 1 tablet by mouth four times daily. Take with levodopa/carbidopa 120 tablet 11    gabapentin (Neurontin) 300 mg capsule Take 1 capsule (300 mg) by mouth 4 times a day. 120 capsule 1    lactulose (Enulose) 20 gram/30 mL oral solution Take 15 mL by mouth once daily at bedtime. 473 mL 3    letrozole (Femara) 2.5 mg tablet Take 1 tablet (2.5 mg total) by mouth once daily. 90 tablet 2    levothyroxine (Synthroid, Levoxyl) 50 mcg tablet Take 1.5 tab(s) on Monday, Wednesday, and Friday and 1 tab daily on all other days 145 tablet 3    nitroglycerin (Nitrostat) 0.4 mg SL tablet Place 1 tablet (0.4 mg) under the tongue every 5 minutes if needed for chest pain. After the second dose call 911 25 tablet 1    [DISCONTINUED] lactulose 20 gram/30 mL oral solution Take 15 mL by mouth once daily at bedtime. 473 mL 3     No facility-administered encounter medications on file as of 5/23/2025.

## 2025-05-23 NOTE — PROGRESS NOTES
Pt to have coag fators drawn at the hosp lab- Dr Bridges will yuki pt with the results  Pt to follow with PCP in Sept  Reviewed AVS with patient- patient verbalizes understanding

## 2025-05-23 NOTE — PROGRESS NOTES
Patient ID:Nusrat Irizarry is a 64 y.o. year old female patient with No primary diagnosis found.      Referring Physician: YOHANA Ramachandran-CNP  [address]  [place] [id number]  Primary Care Provider: Lynda Ibarra PA-C    Chief Complaint  Chief Complaint   Patient presents with    Multiple PE          History of the Present Illness      Interval Note      Monitoring Parameters      Review of Systems - Oncology   Review of Systems   Constitutional:  Positive for fatigue. Negative for appetite change and unexpected weight change.   HENT:  Positive for trouble swallowing. Negative for dental problem and nosebleeds.    Eyes:  Negative for visual disturbance.   Respiratory:  Negative for cough and shortness of breath.    Cardiovascular:  Negative for leg swelling.   Gastrointestinal:  Negative for abdominal pain, constipation, diarrhea, nausea and vomiting.   Endocrine: Negative for polyuria.   Genitourinary:  Negative for dysuria, frequency and urgency.   Musculoskeletal:  Positive for arthralgias, gait problem, joint swelling, myalgias and neck stiffness.   Skin:  Negative for pallor and rash.   Neurological:  Positive for tremors, syncope, weakness and numbness. Negative for speech difficulty, light-headedness and headaches.   Hematological:  Bruises/bleeds easily.   Psychiatric/Behavioral:  Negative for self-injury, sleep disturbance and suicidal ideas. The patient is not nervous/anxious.         Past Medical History  Medical History[1]     Surgical History  Surgical History[2]    Social History  Social History[3]     Family History  family history includes Breast cancer in an other family member; Heart disease in her mother; Heart failure in her father; Hyperthyroidism in her sister; Lung cancer in her sister.       Current Medications  Current Outpatient Medications   Medication Instructions    amantadine (Symmetrel) 100 mg capsule Take 1 capsule by mouth two times a day.    apixaban  (Eliquis) 5 mg (74 tabs) tablet Take 2 tablets (10 mg) by mouth 2 times a day for 7 days, THEN 1 tablet (5 mg) 2 times a day.    aspirin 81 mg chewable tablet 1 tablet, Daily    atorvastatin (LIPITOR) 40 mg, oral, Daily    carbidopa-levodopa (Sinemet)  mg tablet Take 2 tablets by mouth five times a day. 8am,12pm, 6pm and 8pm and last dose upon waking up middle of the night around MIDNIGHT-2AM    diclofenac sodium (Voltaren) 1 % gel Apply 2 grams 4x/daily to affected area-left knee    entacapone (Comtan) 200 mg tablet Take 1 tablet by mouth four times daily. Take with levodopa/carbidopa    gabapentin (NEURONTIN) 300 mg, oral, 4 times daily    lactulose (Enulose) 20 gram/30 mL oral solution Take 15 mL by mouth once daily at bedtime.    letrozole (FEMARA) 2.5 mg, oral, Daily    levothyroxine (Synthroid, Levoxyl) 50 mcg tablet Take 1.5 tab(s) on Monday, Wednesday, and Friday and 1 tab daily on all other days    nitroglycerin (NITROSTAT) 0.4 mg, sublingual, Every 5 min PRN, After the second dose call 911      Scheduled Medications[4]     OARRS Review  I have personally reviewed the OARRS report for Nusrat Irizarry. I have considered the risks of abuse, dependence, addiction and diversion    Vital Signs  /78   Pulse 79   Temp 35.6 °C (96.1 °F) (Skin)   Resp 16   Wt 103 kg (226 lb 6.4 oz)   LMP  (LMP Unknown)   SpO2 95%   BMI 33.43 kg/m²      Physical Exam  Vitals and nursing note reviewed. Exam conducted with a chaperone present.   Constitutional:       General: She is not in acute distress.     Appearance: She is not ill-appearing.   HENT:      Head: Normocephalic and atraumatic.      Nose: Nose normal.      Mouth/Throat:      Mouth: Mucous membranes are moist.      Pharynx: Oropharynx is clear.   Eyes:      General: No scleral icterus.     Conjunctiva/sclera: Conjunctivae normal.   Cardiovascular:      Rate and Rhythm: Normal rate and regular rhythm.      Heart sounds: No murmur heard.  Pulmonary:       Effort: Pulmonary effort is normal.      Breath sounds: No wheezing, rhonchi or rales.   Abdominal:      Palpations: Abdomen is soft. There is no mass.      Tenderness: There is no abdominal tenderness. There is no guarding.   Musculoskeletal:         General: Normal range of motion.      Cervical back: Normal range of motion. No tenderness.      Right lower leg: No edema.      Left lower leg: No edema.   Lymphadenopathy:      Cervical: No cervical adenopathy.   Skin:     General: Skin is warm and dry.      Coloration: Skin is not jaundiced or pale.      Findings: No erythema.   Neurological:      Mental Status: She is alert and oriented to person, place, and time. Mental status is at baseline.      Sensory: Sensory deficit present.      Motor: Weakness present.      Coordination: Coordination abnormal.      Gait: Gait abnormal.   Psychiatric:         Mood and Affect: Mood normal.         Behavior: Behavior normal.         Thought Content: Thought content normal.         Judgment: Judgment normal.          Current Laboratory Data  No results found for this or any previous visit (from the past week).       Recent Imaging  Imaging  No results found.    Cardiology, Vascular, and Other Imaging  No other imaging results found for the past 7 days        Pathology  ***       Performance Status:  Symptomatic; fully ambulatory    Assessment/Plan             Latha Bridges MD              [1]   Past Medical History:  Diagnosis Date    Acute renal insufficiency 07/12/2023    Breast cancer     Disease of thyroid gland     Effusion of left knee 08/08/2023    Hx antineoplastic chemo May 2020    Other specified postprocedural states     History of Papanicolaou smear    Parkinson's disease (tremor, stiffness, slow motion, unstable posture) (Multi)     Personal history of colonic polyps     Personal history of colonic polyps    Personal history of irradiation August 2020    Personal history of other diseases of the  circulatory system     History of hypertension    Personal history of other endocrine, nutritional and metabolic disease     History of hypothyroidism    Personal history of other endocrine, nutritional and metabolic disease     History of hyperglycemia    Personal history of other malignant neoplasm of skin     History of malignant neoplasm of skin    Personal history of other medical treatment     History of screening mammography   [2]   Past Surgical History:  Procedure Laterality Date    APPENDECTOMY      Appendectomy    BREAST LUMPECTOMY Right     CARDIAC SURGERY  2013    REPAIR OF HOLE IN HEART    COLONOSCOPY  11/18/2019    NORMAL; H/O COLONIC POLYPS IN PAST-REPEAT IN 5 YEARS    CT ANGIO CORONARY ART WITH HEARTFLOW IF SCORE >30%  09/16/2021    CT HEART CORONARY ANGIOGRAM 9/16/2021 [facility] ANCILLARY LEGACY    HYSTERECTOMY  04/16/2018    [place]    INCISIONAL BREAST BIOPSY      Incisional Breast Biopsy    INJECTION Left 08/16/2024    Left Sacroiliac Joint Injection    INJECTION Bilateral 09/27/2024    superior Cluneal nerve block    IR INJECTION EPIDURAL STEROID N/A 03/01/2024    L5-S1    IR INJECTION NERVE BLOCK Bilateral 12/15/2023    Bilat L2/3 and L3/4 MBB    LYMPHADENECTOMY      axillary    OOPHORECTOMY  4/16\2018    OTHER SURGICAL HISTORY  10/02/2018    Bunion Correction By Osborne Procedure    OTHER SURGICAL HISTORY  01/05/2023    Intra-articular corticosteroid injection    OTHER SURGICAL HISTORY  01/21/2022    Epidural steroid injection    OTHER SURGICAL HISTORY  12/08/2021    Radiofrequency ablation    OTHER SURGICAL HISTORY  08/23/2021    Medial branch block    OTHER SURGICAL HISTORY  09/01/2021    Carpal tunnel surgery    OTHER SURGICAL HISTORY  09/23/2021    Medial branch block    OTHER SURGICAL HISTORY  06/17/2022    Epidural steroid injection    OTHER SURGICAL HISTORY  11/17/2022    Epidural steroid injection    RADIOFREQUENCY ABLATION Bilateral 06/28/2024    Bilat L3-5 MB RFA     RADIOFREQUENCY ABLATION Bilateral 10/25/2024    Bilat Superior Cluneal RFA    RADIOFREQUENCY ABLATION Bilateral 03/12/2025    Bilat L4/5-L5/S1 RFA    SACROILIAC JOINT INJECTION Right 04/26/2024    Rt SIJ    TONSILLECTOMY  1968    Tonsillectomy With Adenoidectomy    TUBAL LIGATION  1993    Tubal Ligation   [3]   Social History  Tobacco Use    Smoking status: Never    Smokeless tobacco: Never   Vaping Use    Vaping status: Never Used   Substance Use Topics    Alcohol use: Yes     Comment: occasional    Drug use: Never   [4]    1 resulting in her hitting her head.  No seizure activity was noted.  She was treated with IV fluids, support stockings and midodrine and started on Florinef.  She was referred to neurology.  Echocardiogram showed an ejection fraction of 55 to 60% with no wall motion abnormality.    5/27/2020.  Followed up with Dr. Gallardo.   Hemoglobin was 11.5 hematocrit 35.2 with a normal white count and slightly elevated platelet count 439.  Serum chemistries were normal except for sugar 103.  Chemotherapy was continued with IV fluids    5/31/2020 through 6/2/2020.  Admitted to the hospital through the emergency room with generalized weakness and syncope.  She was found by her  having had a syncopal episode for about 5 to 10 minutes.    6/24/2020.  Seen by Dr. Cornelius from cardiology who prescribed aspirin 3 times a day along with pantoprazole 40 mg twice a day for 6 weeks because of her pericarditis    7/7/2020.  Patient decided to quit chemotherapy because of excess toxicity and was started on letrozole 2.5 mg once a day.  Radiation therapy was started with Dr. Angulo the following week.  Started on letrozole.  Port was removed.    7/14/2020 through August 24, 2020.  Received  radiation therapy to her breast receiving 6000 cGy in 30 fractions complicated by radiation dermatitis    1/3/2022.  Referred to Dr. Maier because of change in insurance.  He noted that her mammograms were stable.  She was on Zometa every 6 months adjuvantly and her DEXA scan was due in April 2022.    7/28/2022.  Followed up with Dr. Maier at which time she had arthralgias and myalgias and her DEXA scan had shown osteopenia with decreased bone mineral density for which Zometa was continued.  We discussed switching to tamoxifen if there was further bone mineral density loss.    7/7/2023 through 7/10/2023.  Hospitalized for what was felt to be an allergic reaction to his Zometa infusion.  She had polyarticular arthralgias and arthritis she also had acute  kidney injury and leukocytosis.  She was hydrated and her OMA resolved.  She was given empiric antibiotics but when cultures returned negative these were stopped.  Her EGFR had dropped to 38 but then came back to about 65.    Carpal tunnel  Hypertension  Hyperlipidemia  Parkinson's disease  History of breast cancer  Hypothyroidism  Pulmonary nodules  Chronic pericardial effusion  Chronic constipation  History of infected lymph node requiring surgery and antibiotics complicated by axillary seroma in 2015  Basal cell carcinoma of the left lower eyelid  Colon polyps  Abnormal Pap smear with LEEP procedure 2001  History of intermittent midsternal chest discomfort for which she had a negative stress test in 2016.  Calcific tendinitis right shoulder  Chronic low back pain for which she was referred to pain management and treated with injections and radiofrequency ablation  Closed nondisplaced fracture of the head of the left radius treated conservatively after she slipped and fell on wet floor landing on her elbow  Overactive bladder  Fractured shaft of fifth metacarpal bone of the left hand for which she had surgical pinning    Monitoring Parameters  History and physical examinations, imaging studies as dictated by symptomatology.    Review of Systems - Oncology   Review of Systems   Constitutional:  Positive for fatigue. Negative for appetite change and unexpected weight change.   HENT:  Positive for trouble swallowing. Negative for dental problem and nosebleeds.    Eyes:  Negative for visual disturbance.   Respiratory:  Negative for cough and shortness of breath.    Cardiovascular:  Negative for leg swelling.   Gastrointestinal:  Negative for abdominal pain, constipation, diarrhea, nausea and vomiting.   Endocrine: Negative for polyuria.   Genitourinary:  Negative for dysuria, frequency and urgency.   Musculoskeletal:  Positive for arthralgias, gait problem, joint swelling, myalgias and neck stiffness.   Skin:   Negative for pallor and rash.   Neurological:  Positive for tremors, syncope, weakness and numbness. Negative for speech difficulty, light-headedness and headaches.   Hematological:  Bruises/bleeds easily.   Psychiatric/Behavioral:  Negative for self-injury, sleep disturbance and suicidal ideas. The patient is not nervous/anxious.         Past Medical History  Medical History[1]     Surgical History  Surgical History[2]    Social History  Social History[3]     Family History  family history includes Breast cancer in an other family member; Heart disease in her mother; Heart failure in her father; Hyperthyroidism in her sister; Lung cancer in her sister.   Maternal grandmother had breast cancer.  Sister uterine cancer.    Current Medications  Current Outpatient Medications   Medication Instructions    amantadine (Symmetrel) 100 mg capsule Take 1 capsule by mouth two times a day.    apixaban (Eliquis) 5 mg (74 tabs) tablet Take 2 tablets (10 mg) by mouth 2 times a day for 7 days, THEN 1 tablet (5 mg) 2 times a day.    aspirin 81 mg chewable tablet 1 tablet, Daily    atorvastatin (LIPITOR) 40 mg, oral, Daily    carbidopa-levodopa (Sinemet)  mg tablet Take 2 tablets by mouth five times a day. 8am,12pm, 6pm and 8pm and last dose upon waking up middle of the night around MIDNIGHT-2AM    diclofenac sodium (Voltaren) 1 % gel Apply 2 grams 4x/daily to affected area-left knee    entacapone (Comtan) 200 mg tablet Take 1 tablet by mouth four times daily. Take with levodopa/carbidopa    gabapentin (NEURONTIN) 300 mg, oral, 4 times daily    lactulose (Enulose) 20 gram/30 mL oral solution Take 15 mL by mouth once daily at bedtime.    letrozole (FEMARA) 2.5 mg, oral, Daily    levothyroxine (Synthroid, Levoxyl) 50 mcg tablet Take 1.5 tab(s) on Monday, Wednesday, and Friday and 1 tab daily on all other days    nitroglycerin (NITROSTAT) 0.4 mg, sublingual, Every 5 min PRN, After the second dose call 911      Scheduled  Medications[4]     OARRS Review  I have personally reviewed the OARRS report for Nusrat Irizarry. I have considered the risks of abuse, dependence, addiction and diversion.  Review of this document shows that she has been on a long-term gabapentin through our office.    Vital Signs  /78   Pulse 79   Temp 35.6 °C (96.1 °F) (Skin)   Resp 16   Wt 103 kg (226 lb 6.4 oz)   LMP  (LMP Unknown)   SpO2 95%   BMI 33.43 kg/m²      Physical Exam  Vitals and nursing note reviewed. Exam conducted with a chaperone present.   Constitutional:       General: She is not in acute distress.     Appearance: She is not ill-appearing.      Comments: She is a well-developed well-nourished overweight white female who is in no acute distress.  She has a very flat affect consistent with her Parkinson's disease.  She does not appear to be acutely ill but is somewhat chronically ill.  She has an obvious left hand tremor.   HENT:      Head: Normocephalic and atraumatic.      Nose: Nose normal.      Mouth/Throat:      Mouth: Mucous membranes are moist.      Pharynx: Oropharynx is clear. No oropharyngeal exudate or posterior oropharyngeal erythema.   Eyes:      General: No scleral icterus.     Conjunctiva/sclera: Conjunctivae normal.      Pupils: Pupils are equal, round, and reactive to light.   Neck:      Comments: There is no significant lymphadenopathy about the head neck region, supraclavicular axillary or inguinal regions bilaterally.  Cardiovascular:      Rate and Rhythm: Normal rate and regular rhythm.      Heart sounds: No murmur heard.  Pulmonary:      Effort: Pulmonary effort is normal. No respiratory distress.      Breath sounds: Normal breath sounds. No wheezing, rhonchi or rales.   Chest:      Comments: Well-healed right sided breast scar with no evidence of recurrent disease.  Left breast is normal.  Abdominal:      General: There is no distension.      Palpations: Abdomen is soft. There is no mass.      Tenderness:  There is no abdominal tenderness. There is no guarding or rebound.   Musculoskeletal:         General: Normal range of motion.      Cervical back: Normal range of motion. No tenderness.      Right lower leg: No edema.      Left lower leg: No edema.   Lymphadenopathy:      Cervical: No cervical adenopathy.   Skin:     General: Skin is warm and dry.      Coloration: Skin is not jaundiced or pale.      Findings: No erythema.   Neurological:      Mental Status: She is alert and oriented to person, place, and time. Mental status is at baseline.      Sensory: Sensory deficit present.      Motor: Weakness present.      Coordination: Coordination abnormal.      Gait: Gait abnormal.      Comments: Masked facies consistent with parkinsonian syndrome.  Able to climb onto the examination table without any assistance.   Psychiatric:         Mood and Affect: Mood normal.         Behavior: Behavior normal.         Thought Content: Thought content normal.         Judgment: Judgment normal.      Comments: Seems to be coping well.          Current Laboratory Data  No results found for this or any previous visit (from the past week).   Laboratory data done on 5/13/2025 was normal with the exception of a chloride of 108 and a calcium of 8.1.  Most recent CBC was normal    Recent Imaging  Imaging  No results found.  See narrative for measuring studies.    Cardiology, Vascular, and Other Imaging  No other imaging results found for the past 7 days        Pathology  Right side breast cancer, 1.8 cm, ER positive, MS positive, HER2 negative with high recurrence score of 29 but unable to complete her chemotherapy due to multiple side effects with multiple hospital stays.    Multiple pulmonary emboli      Performance Status:  Symptomatic; fully ambulatory    Assessment/Plan    1.  Multiple pulmonary emboli.  We discussed the multiple possible underlying causes of her pulmonary emboli including her past history of breast cancer, her sedentary  lifestyle, various inherited and acquired causes of hypercoagulability.  We will encourage her as many have to keep physically active and we will test her for factor V Leiden mutation and the prothrombin gene mutation.  Because this was a life-threatening event I would recommend that she stay 1 anticoagulation indefinitely until the risks are worse than the benefits.  She does have a history of multiple falls in the past but none recently.  This could be a deciding factor in her anticoagulation future.  We will call her with results of these tests when they have been posted.  If she does come off the Eliquis, she could have further hypercoagulability testing.    2.  History of breast cancer.  This was a 1.8 cm infiltrating ductal carcinoma, ER positive, MI positive, HER2 negative on FISH with 1 lymph node with micrometastasis measuring about 1 mm.  Her Oncotype DX score was high but she could not tolerate chemotherapy after 2 cycles having been admitted to the hospital multiple times with significant injury.  She is being followed on letrozole.  She would be a good candidate for the Breast Cancer Index to see if she would benefit from extended treatment with hormonal manipulation.  This was ordered.    3.  Multiple comorbidities.  These include but are not limited to:  Patent foramen ovale status postclosure  Parkinson's disease  Carpal tunnel  Hypertension  Hyperlipidemia  Parkinson's disease  History of breast cancer  Hypothyroidism  Pulmonary nodules  Chronic pericardial effusion  Chronic constipation  History of infected lymph node requiring surgery and antibiotics complicated by axillary seroma in 2015  Basal cell carcinoma of the left lower eyelid  Colon polyps  Abnormal Pap smear with LEEP procedure 2001  History of intermittent midsternal chest discomfort for which she had a negative stress test in 2016.  Calcific tendinitis right shoulder  Chronic low back pain for which she was referred to pain management  and treated with injections and radiofrequency ablation  Closed nondisplaced fracture of the head of the left radius treated conservatively after she slipped and fell on wet floor landing on her elbow  Overactive bladder  Fractured shaft of fifth metacarpal bone of the left hand for which she had surgical pinning        I will call her with the results of these test x-ray have been resulted.  She will continue follow-up with Lynda Covarrubias for her breast cancer surveillance.  She knows that she should call if she has any change in her status, questions or concerns.    Latha Bridges MD                [1]   Past Medical History:  Diagnosis Date    Acute renal insufficiency 07/12/2023    Breast cancer     Disease of thyroid gland     Effusion of left knee 08/08/2023    Hx antineoplastic chemo May 2020    Other specified postprocedural states     History of Papanicolaou smear    Parkinson's disease (tremor, stiffness, slow motion, unstable posture) (Multi)     Personal history of colonic polyps     Personal history of colonic polyps    Personal history of irradiation August 2020    Personal history of other diseases of the circulatory system     History of hypertension    Personal history of other endocrine, nutritional and metabolic disease     History of hypothyroidism    Personal history of other endocrine, nutritional and metabolic disease     History of hyperglycemia    Personal history of other malignant neoplasm of skin     History of malignant neoplasm of skin    Personal history of other medical treatment     History of screening mammography   [2]   Past Surgical History:  Procedure Laterality Date    APPENDECTOMY      Appendectomy    BREAST LUMPECTOMY Right     CARDIAC SURGERY  2013    REPAIR OF HOLE IN HEART    COLONOSCOPY  11/18/2019    NORMAL; H/O COLONIC POLYPS IN PAST-REPEAT IN 5 YEARS    CT ANGIO CORONARY ART WITH HEARTFLOW IF SCORE >30%  09/16/2021    CT HEART CORONARY ANGIOGRAM 9/16/2021  [facility]    HYSTERECTOMY  04/16/2018    [facility]    INCISIONAL BREAST BIOPSY      Incisional Breast Biopsy    INJECTION Left 08/16/2024    Left Sacroiliac Joint Injection    INJECTION Bilateral 09/27/2024    superior Cluneal nerve block    IR INJECTION EPIDURAL STEROID N/A 03/01/2024    L5-S1    IR INJECTION NERVE BLOCK Bilateral 12/15/2023    Bilat L2/3 and L3/4 MBB    LYMPHADENECTOMY      axillary    OOPHORECTOMY  4/16\2018    OTHER SURGICAL HISTORY  10/02/2018    Bunion Correction By Osborne Procedure    OTHER SURGICAL HISTORY  01/05/2023    Intra-articular corticosteroid injection    OTHER SURGICAL HISTORY  01/21/2022    Epidural steroid injection    OTHER SURGICAL HISTORY  12/08/2021    Radiofrequency ablation    OTHER SURGICAL HISTORY  08/23/2021    Medial branch block    OTHER SURGICAL HISTORY  09/01/2021    Carpal tunnel surgery    OTHER SURGICAL HISTORY  09/23/2021    Medial branch block    OTHER SURGICAL HISTORY  06/17/2022    Epidural steroid injection    OTHER SURGICAL HISTORY  11/17/2022    Epidural steroid injection    RADIOFREQUENCY ABLATION Bilateral 06/28/2024    Bilat L3-5 MB RFA    RADIOFREQUENCY ABLATION Bilateral 10/25/2024    Bilat Superior Cluneal RFA    RADIOFREQUENCY ABLATION Bilateral 03/12/2025    Bilat L4/5-L5/S1 RFA    SACROILIAC JOINT INJECTION Right 04/26/2024    Rt SIJ    TONSILLECTOMY  1968    Tonsillectomy With Adenoidectomy    TUBAL LIGATION  1993    Tubal Ligation   [3]   Social History  Tobacco Use    Smoking status: Never    Smokeless tobacco: Never   Vaping Use    Vaping status: Never Used   Substance Use Topics    Alcohol use: Yes     Comment: occasional    Drug use: Never   [4]

## 2025-05-23 NOTE — PROGRESS NOTES
Unable to reach patient for follow up call after recent hospitalization and PCP follow up appt.   Left voicemail with call back number for patient to call if needed

## 2025-05-25 PROCEDURE — RXMED WILLOW AMBULATORY MEDICATION CHARGE

## 2025-05-27 ENCOUNTER — LAB (OUTPATIENT)
Dept: LAB | Facility: HOSPITAL | Age: 65
End: 2025-05-27
Payer: MEDICARE

## 2025-05-27 ENCOUNTER — PHARMACY VISIT (OUTPATIENT)
Dept: PHARMACY | Facility: CLINIC | Age: 65
End: 2025-05-27
Payer: MEDICARE

## 2025-05-27 PROCEDURE — 81241 F5 GENE: CPT

## 2025-05-27 PROCEDURE — 81240 F2 GENE: CPT

## 2025-06-02 LAB
ELECTRONICALLY SIGNED BY: ABNORMAL
ELECTRONICALLY SIGNED BY: NORMAL
FACTOR II-PROTHROMBIN INTERPRETATION: NORMAL
FACTOR II-PROTHROMBIN RESULT: NORMAL
FACTOR V LEIDEN INTERPRETATION: ABNORMAL
FACTOR V LEIDEN RESULT: ABNORMAL

## 2025-06-04 DIAGNOSIS — I26.99 PULMONARY EMBOLISM: ICD-10-CM

## 2025-06-04 PROCEDURE — RXMED WILLOW AMBULATORY MEDICATION CHARGE

## 2025-06-04 NOTE — TELEPHONE ENCOUNTER
Pt called asking for a short supply of eliquis she was given this when she was in the hospital discharge date was 05/13.

## 2025-06-05 ENCOUNTER — TELEPHONE (OUTPATIENT)
Dept: PRIMARY CARE | Facility: CLINIC | Age: 65
End: 2025-06-05
Payer: MEDICARE

## 2025-06-05 NOTE — TELEPHONE ENCOUNTER
PATIENT CALLED [facility] AND SHE WOULD LIKE A REFERRAL TO OUR  PHARMACY PROGRAM SO SHE CAN GET ASSISTANCE.  PLEASE PUT IN ORDER AND LET PATIENT KNOW WE SENT IT TO PHARMACY FOR SCHEDULING.  THANKS

## 2025-06-06 DIAGNOSIS — I26.99 PULMONARY EMBOLISM: ICD-10-CM

## 2025-06-06 NOTE — TELEPHONE ENCOUNTER
Ok that sounds fine, I will forward to your MA for them to contact Pharmacy and start referral.  Thanks.

## 2025-06-06 NOTE — TELEPHONE ENCOUNTER
What is the name of the referral.  I dont believe I have done this but its always been through verbal in the past

## 2025-06-06 NOTE — TELEPHONE ENCOUNTER
SENT NEW MEDICATION REFILL TO [facility] TO  [facility]. THE PHARMACY SHOULD THEN REACH OUT TO PT, AND HELP SET IT UP.

## 2025-06-06 NOTE — TELEPHONE ENCOUNTER
PLEASE RESEND MEDICATION TO NEW PHARMACY. THEY SHOULD THEN REACH OUT TO PATIENT TO FILL OUT FORMS.

## 2025-06-09 ENCOUNTER — PHARMACY VISIT (OUTPATIENT)
Dept: PHARMACY | Facility: CLINIC | Age: 65
End: 2025-06-09
Payer: MEDICARE

## 2025-06-09 PROCEDURE — RXMED WILLOW AMBULATORY MEDICATION CHARGE

## 2025-06-10 ENCOUNTER — SPECIALTY PHARMACY (OUTPATIENT)
Dept: PHARMACY | Facility: CLINIC | Age: 65
End: 2025-06-10

## 2025-06-12 ENCOUNTER — APPOINTMENT (OUTPATIENT)
Dept: RADIOLOGY | Facility: HOSPITAL | Age: 65
DRG: 389 | End: 2025-06-12
Payer: MEDICARE

## 2025-06-12 ENCOUNTER — HOSPITAL ENCOUNTER (INPATIENT)
Facility: HOSPITAL | Age: 65
LOS: 1 days | Discharge: HOME | DRG: 389 | End: 2025-06-13
Attending: EMERGENCY MEDICINE | Admitting: HOSPITALIST
Payer: MEDICARE

## 2025-06-12 ENCOUNTER — APPOINTMENT (OUTPATIENT)
Dept: CARDIOLOGY | Facility: HOSPITAL | Age: 65
DRG: 389 | End: 2025-06-12
Payer: MEDICARE

## 2025-06-12 DIAGNOSIS — N28.9 ACUTE RENAL INSUFFICIENCY: ICD-10-CM

## 2025-06-12 DIAGNOSIS — Z11.59 MEASLES SCREENING: ICD-10-CM

## 2025-06-12 DIAGNOSIS — K56.600 PARTIAL SMALL BOWEL OBSTRUCTION (MULTI): Primary | ICD-10-CM

## 2025-06-12 LAB
ALBUMIN SERPL BCP-MCNC: 4.3 G/DL (ref 3.4–5)
ALP SERPL-CCNC: 80 U/L (ref 33–136)
ALT SERPL W P-5'-P-CCNC: 3 U/L (ref 7–45)
ANION GAP SERPL CALC-SCNC: 14 MMOL/L (ref 10–20)
APPEARANCE UR: CLEAR
APTT PPP: 37 SECONDS (ref 26–36)
AST SERPL W P-5'-P-CCNC: 9 U/L (ref 9–39)
BASOPHILS # BLD AUTO: 0.01 X10*3/UL (ref 0–0.1)
BASOPHILS NFR BLD AUTO: 0.1 %
BILIRUB SERPL-MCNC: 0.7 MG/DL (ref 0–1.2)
BILIRUB UR STRIP.AUTO-MCNC: NEGATIVE MG/DL
BUN SERPL-MCNC: 31 MG/DL (ref 6–23)
CALCIUM SERPL-MCNC: 9.4 MG/DL (ref 8.6–10.3)
CARDIAC TROPONIN I PNL SERPL HS: 5 NG/L (ref 0–13)
CARDIAC TROPONIN I PNL SERPL HS: 5 NG/L (ref 0–13)
CHLORIDE SERPL-SCNC: 104 MMOL/L (ref 98–107)
CO2 SERPL-SCNC: 26 MMOL/L (ref 21–32)
COLOR UR: YELLOW
CREAT SERPL-MCNC: 1.36 MG/DL (ref 0.5–1.05)
EGFRCR SERPLBLD CKD-EPI 2021: 43 ML/MIN/1.73M*2
EOSINOPHIL # BLD AUTO: 0.02 X10*3/UL (ref 0–0.7)
EOSINOPHIL NFR BLD AUTO: 0.2 %
ERYTHROCYTE [DISTWIDTH] IN BLOOD BY AUTOMATED COUNT: 12.6 % (ref 11.5–14.5)
GLUCOSE SERPL-MCNC: 125 MG/DL (ref 74–99)
GLUCOSE UR STRIP.AUTO-MCNC: NORMAL MG/DL
HCT VFR BLD AUTO: 37.6 % (ref 36–46)
HGB BLD-MCNC: 12.6 G/DL (ref 12–16)
HOLD SPECIMEN: NORMAL
HYALINE CASTS #/AREA URNS AUTO: ABNORMAL /LPF
IMM GRANULOCYTES # BLD AUTO: 0.01 X10*3/UL (ref 0–0.7)
IMM GRANULOCYTES NFR BLD AUTO: 0.1 % (ref 0–0.9)
INR PPP: 1.3 (ref 0.9–1.1)
KETONES UR STRIP.AUTO-MCNC: ABNORMAL MG/DL
LEUKOCYTE ESTERASE UR QL STRIP.AUTO: NEGATIVE
LIPASE SERPL-CCNC: 10 U/L (ref 9–82)
LYMPHOCYTES # BLD AUTO: 0.72 X10*3/UL (ref 1.2–4.8)
LYMPHOCYTES NFR BLD AUTO: 8.8 %
MAGNESIUM SERPL-MCNC: 1.96 MG/DL (ref 1.6–2.4)
MCH RBC QN AUTO: 30 PG (ref 26–34)
MCHC RBC AUTO-ENTMCNC: 33.5 G/DL (ref 32–36)
MCV RBC AUTO: 90 FL (ref 80–100)
MONOCYTES # BLD AUTO: 0.54 X10*3/UL (ref 0.1–1)
MONOCYTES NFR BLD AUTO: 6.6 %
MUCOUS THREADS #/AREA URNS AUTO: ABNORMAL /LPF
NEUTROPHILS # BLD AUTO: 6.85 X10*3/UL (ref 1.2–7.7)
NEUTROPHILS NFR BLD AUTO: 84.2 %
NITRITE UR QL STRIP.AUTO: NEGATIVE
NRBC BLD-RTO: 0 /100 WBCS (ref 0–0)
PH UR STRIP.AUTO: 6 [PH]
PLATELET # BLD AUTO: 223 X10*3/UL (ref 150–450)
POTASSIUM SERPL-SCNC: 4 MMOL/L (ref 3.5–5.3)
PROT SERPL-MCNC: 6.7 G/DL (ref 6.4–8.2)
PROT UR STRIP.AUTO-MCNC: ABNORMAL MG/DL
PROTHROMBIN TIME: 13.9 SECONDS (ref 9.8–12.4)
RBC # BLD AUTO: 4.2 X10*6/UL (ref 4–5.2)
RBC # UR STRIP.AUTO: ABNORMAL MG/DL
RBC #/AREA URNS AUTO: >20 /HPF
SODIUM SERPL-SCNC: 140 MMOL/L (ref 136–145)
SP GR UR STRIP.AUTO: 1.03
UROBILINOGEN UR STRIP.AUTO-MCNC: NORMAL MG/DL
WBC # BLD AUTO: 8.2 X10*3/UL (ref 4.4–11.3)
WBC #/AREA URNS AUTO: ABNORMAL /HPF

## 2025-06-12 PROCEDURE — 84484 ASSAY OF TROPONIN QUANT: CPT | Performed by: EMERGENCY MEDICINE

## 2025-06-12 PROCEDURE — 80053 COMPREHEN METABOLIC PANEL: CPT | Performed by: EMERGENCY MEDICINE

## 2025-06-12 PROCEDURE — 83690 ASSAY OF LIPASE: CPT | Performed by: EMERGENCY MEDICINE

## 2025-06-12 PROCEDURE — 2550000001 HC RX 255 CONTRASTS: Performed by: EMERGENCY MEDICINE

## 2025-06-12 PROCEDURE — 74177 CT ABD & PELVIS W/CONTRAST: CPT

## 2025-06-12 PROCEDURE — 96360 HYDRATION IV INFUSION INIT: CPT

## 2025-06-12 PROCEDURE — 99222 1ST HOSP IP/OBS MODERATE 55: CPT | Performed by: HOSPITALIST

## 2025-06-12 PROCEDURE — 2500000001 HC RX 250 WO HCPCS SELF ADMINISTERED DRUGS (ALT 637 FOR MEDICARE OP): Performed by: EMERGENCY MEDICINE

## 2025-06-12 PROCEDURE — 36415 COLL VENOUS BLD VENIPUNCTURE: CPT | Performed by: EMERGENCY MEDICINE

## 2025-06-12 PROCEDURE — 2500000004 HC RX 250 GENERAL PHARMACY W/ HCPCS (ALT 636 FOR OP/ED): Performed by: HOSPITALIST

## 2025-06-12 PROCEDURE — 99222 1ST HOSP IP/OBS MODERATE 55: CPT | Performed by: SURGERY

## 2025-06-12 PROCEDURE — G0378 HOSPITAL OBSERVATION PER HR: HCPCS

## 2025-06-12 PROCEDURE — 85025 COMPLETE CBC W/AUTO DIFF WBC: CPT | Performed by: EMERGENCY MEDICINE

## 2025-06-12 PROCEDURE — 99285 EMERGENCY DEPT VISIT HI MDM: CPT | Mod: 25 | Performed by: EMERGENCY MEDICINE

## 2025-06-12 PROCEDURE — 71045 X-RAY EXAM CHEST 1 VIEW: CPT | Performed by: STUDENT IN AN ORGANIZED HEALTH CARE EDUCATION/TRAINING PROGRAM

## 2025-06-12 PROCEDURE — 81001 URINALYSIS AUTO W/SCOPE: CPT | Performed by: EMERGENCY MEDICINE

## 2025-06-12 PROCEDURE — 2500000004 HC RX 250 GENERAL PHARMACY W/ HCPCS (ALT 636 FOR OP/ED): Performed by: EMERGENCY MEDICINE

## 2025-06-12 PROCEDURE — 71275 CT ANGIOGRAPHY CHEST: CPT

## 2025-06-12 PROCEDURE — 85610 PROTHROMBIN TIME: CPT | Performed by: EMERGENCY MEDICINE

## 2025-06-12 PROCEDURE — 2500000001 HC RX 250 WO HCPCS SELF ADMINISTERED DRUGS (ALT 637 FOR MEDICARE OP): Performed by: HOSPITALIST

## 2025-06-12 PROCEDURE — 85730 THROMBOPLASTIN TIME PARTIAL: CPT | Performed by: EMERGENCY MEDICINE

## 2025-06-12 PROCEDURE — 83735 ASSAY OF MAGNESIUM: CPT | Performed by: EMERGENCY MEDICINE

## 2025-06-12 PROCEDURE — 71045 X-RAY EXAM CHEST 1 VIEW: CPT

## 2025-06-12 PROCEDURE — 1100000001 HC PRIVATE ROOM DAILY

## 2025-06-12 PROCEDURE — 2500000005 HC RX 250 GENERAL PHARMACY W/O HCPCS: Performed by: HOSPITALIST

## 2025-06-12 PROCEDURE — 93005 ELECTROCARDIOGRAM TRACING: CPT

## 2025-06-12 PROCEDURE — 96361 HYDRATE IV INFUSION ADD-ON: CPT

## 2025-06-12 RX ORDER — ATORVASTATIN CALCIUM 40 MG/1
40 TABLET, FILM COATED ORAL NIGHTLY
Status: DISCONTINUED | OUTPATIENT
Start: 2025-06-12 | End: 2025-06-13 | Stop reason: HOSPADM

## 2025-06-12 RX ORDER — AMANTADINE HYDROCHLORIDE 100 MG/1
100 CAPSULE, GELATIN COATED ORAL 2 TIMES DAILY
Status: DISCONTINUED | OUTPATIENT
Start: 2025-06-12 | End: 2025-06-13 | Stop reason: HOSPADM

## 2025-06-12 RX ORDER — LEVOTHYROXINE SODIUM 50 UG/1
50 TABLET ORAL
Status: DISCONTINUED | OUTPATIENT
Start: 2025-06-14 | End: 2025-06-13 | Stop reason: HOSPADM

## 2025-06-12 RX ORDER — ACETAMINOPHEN 325 MG/1
650 TABLET ORAL EVERY 6 HOURS PRN
Status: DISCONTINUED | OUTPATIENT
Start: 2025-06-12 | End: 2025-06-13 | Stop reason: HOSPADM

## 2025-06-12 RX ORDER — POLYETHYLENE GLYCOL 3350 17 G/17G
17 POWDER, FOR SOLUTION ORAL DAILY
COMMUNITY

## 2025-06-12 RX ORDER — GABAPENTIN 300 MG/1
300 CAPSULE ORAL 4 TIMES DAILY
Status: DISCONTINUED | OUTPATIENT
Start: 2025-06-12 | End: 2025-06-13 | Stop reason: HOSPADM

## 2025-06-12 RX ORDER — ACETAMINOPHEN 500 MG
500 TABLET ORAL EVERY 6 HOURS PRN
COMMUNITY

## 2025-06-12 RX ORDER — CARBIDOPA AND LEVODOPA 25; 100 MG/1; MG/1
2 TABLET ORAL
Status: DISCONTINUED | OUTPATIENT
Start: 2025-06-12 | End: 2025-06-13 | Stop reason: HOSPADM

## 2025-06-12 RX ORDER — ONDANSETRON HYDROCHLORIDE 2 MG/ML
4 INJECTION, SOLUTION INTRAVENOUS EVERY 6 HOURS PRN
Status: DISCONTINUED | OUTPATIENT
Start: 2025-06-12 | End: 2025-06-13 | Stop reason: HOSPADM

## 2025-06-12 RX ORDER — LEVOTHYROXINE SODIUM 50 UG/1
75 TABLET ORAL
Status: DISCONTINUED | OUTPATIENT
Start: 2025-06-13 | End: 2025-06-13 | Stop reason: HOSPADM

## 2025-06-12 RX ORDER — MORPHINE SULFATE 2 MG/ML
2 INJECTION, SOLUTION INTRAMUSCULAR; INTRAVENOUS EVERY 4 HOURS PRN
Status: DISCONTINUED | OUTPATIENT
Start: 2025-06-12 | End: 2025-06-13 | Stop reason: HOSPADM

## 2025-06-12 RX ORDER — SODIUM CHLORIDE 9 MG/ML
125 INJECTION, SOLUTION INTRAVENOUS CONTINUOUS
Status: DISCONTINUED | OUTPATIENT
Start: 2025-06-12 | End: 2025-06-12

## 2025-06-12 RX ORDER — NAPROXEN SODIUM 220 MG/1
81 TABLET, FILM COATED ORAL DAILY
Status: DISCONTINUED | OUTPATIENT
Start: 2025-06-13 | End: 2025-06-13 | Stop reason: HOSPADM

## 2025-06-12 RX ORDER — HYDRALAZINE HYDROCHLORIDE 20 MG/ML
10 INJECTION INTRAMUSCULAR; INTRAVENOUS EVERY 6 HOURS PRN
Status: DISCONTINUED | OUTPATIENT
Start: 2025-06-12 | End: 2025-06-13 | Stop reason: HOSPADM

## 2025-06-12 RX ORDER — ENTACAPONE 200 MG/1
200 TABLET ORAL 4 TIMES DAILY
Status: DISCONTINUED | OUTPATIENT
Start: 2025-06-12 | End: 2025-06-13 | Stop reason: HOSPADM

## 2025-06-12 RX ORDER — SODIUM CHLORIDE, SODIUM LACTATE, POTASSIUM CHLORIDE, CALCIUM CHLORIDE 600; 310; 30; 20 MG/100ML; MG/100ML; MG/100ML; MG/100ML
75 INJECTION, SOLUTION INTRAVENOUS CONTINUOUS
Status: ACTIVE | OUTPATIENT
Start: 2025-06-12 | End: 2025-06-13

## 2025-06-12 RX ORDER — DEXTROMETHORPHAN POLISTIREX 30 MG/5 ML
1 SUSPENSION, EXTENDED RELEASE 12 HR ORAL ONCE
Status: COMPLETED | OUTPATIENT
Start: 2025-06-12 | End: 2025-06-12

## 2025-06-12 RX ADMIN — IOHEXOL 68 ML: 350 INJECTION, SOLUTION INTRAVENOUS at 12:08

## 2025-06-12 RX ADMIN — SODIUM PHOSPHATE, DIBASIC AND SODIUM PHOSPHATE, MONOBASIC 1 ENEMA: 7; 19 ENEMA RECTAL at 15:54

## 2025-06-12 RX ADMIN — WITCH HAZEL 1 EACH: 500 SOLUTION RECTAL; TOPICAL at 17:01

## 2025-06-12 RX ADMIN — SODIUM CHLORIDE 125 ML/HR: 0.9 INJECTION, SOLUTION INTRAVENOUS at 13:51

## 2025-06-12 RX ADMIN — APIXABAN 5 MG: 5 TABLET, FILM COATED ORAL at 20:16

## 2025-06-12 RX ADMIN — GABAPENTIN 300 MG: 300 CAPSULE ORAL at 20:15

## 2025-06-12 RX ADMIN — CARBIDOPA AND LEVODOPA 2 TABLET: 25; 100 TABLET ORAL at 17:01

## 2025-06-12 RX ADMIN — ENTACAPONE 200 MG: 200 TABLET ORAL at 20:16

## 2025-06-12 RX ADMIN — ENTACAPONE 200 MG: 200 TABLET ORAL at 17:01

## 2025-06-12 RX ADMIN — CARBIDOPA AND LEVODOPA 2 TABLET: 25; 100 TABLET ORAL at 20:16

## 2025-06-12 RX ADMIN — SODIUM CHLORIDE, POTASSIUM CHLORIDE, SODIUM LACTATE AND CALCIUM CHLORIDE 75 ML/HR: 600; 310; 30; 20 INJECTION, SOLUTION INTRAVENOUS at 15:54

## 2025-06-12 RX ADMIN — MINERAL OIL 1 ENEMA: 100 ENEMA RECTAL at 14:24

## 2025-06-12 RX ADMIN — AMANTADINE 100 MG: 100 CAPSULE ORAL at 20:16

## 2025-06-12 RX ADMIN — GABAPENTIN 300 MG: 300 CAPSULE ORAL at 17:01

## 2025-06-12 RX ADMIN — SODIUM CHLORIDE 1000 ML: 0.9 INJECTION, SOLUTION INTRAVENOUS at 10:22

## 2025-06-12 RX ADMIN — ATORVASTATIN CALCIUM 40 MG: 40 TABLET, FILM COATED ORAL at 20:16

## 2025-06-12 SDOH — SOCIAL STABILITY: SOCIAL INSECURITY: ARE YOU OR HAVE YOU BEEN THREATENED OR ABUSED PHYSICALLY, EMOTIONALLY, OR SEXUALLY BY ANYONE?: NO

## 2025-06-12 SDOH — SOCIAL STABILITY: SOCIAL INSECURITY: ARE THERE ANY APPARENT SIGNS OF INJURIES/BEHAVIORS THAT COULD BE RELATED TO ABUSE/NEGLECT?: NO

## 2025-06-12 SDOH — ECONOMIC STABILITY: FOOD INSECURITY: WITHIN THE PAST 12 MONTHS, YOU WORRIED THAT YOUR FOOD WOULD RUN OUT BEFORE YOU GOT THE MONEY TO BUY MORE.: NEVER TRUE

## 2025-06-12 SDOH — SOCIAL STABILITY: SOCIAL INSECURITY: DO YOU FEEL ANYONE HAS EXPLOITED OR TAKEN ADVANTAGE OF YOU FINANCIALLY OR OF YOUR PERSONAL PROPERTY?: NO

## 2025-06-12 SDOH — SOCIAL STABILITY: SOCIAL INSECURITY: DO YOU FEEL UNSAFE GOING BACK TO THE PLACE WHERE YOU ARE LIVING?: NO

## 2025-06-12 SDOH — SOCIAL STABILITY: SOCIAL INSECURITY: WITHIN THE LAST YEAR, HAVE YOU BEEN HUMILIATED OR EMOTIONALLY ABUSED IN OTHER WAYS BY YOUR PARTNER OR EX-PARTNER?: NO

## 2025-06-12 SDOH — SOCIAL STABILITY: SOCIAL INSECURITY: WERE YOU ABLE TO COMPLETE ALL THE BEHAVIORAL HEALTH SCREENINGS?: YES

## 2025-06-12 SDOH — SOCIAL STABILITY: SOCIAL INSECURITY: ABUSE: ADULT

## 2025-06-12 SDOH — ECONOMIC STABILITY: INCOME INSECURITY: IN THE PAST 12 MONTHS HAS THE ELECTRIC, GAS, OIL, OR WATER COMPANY THREATENED TO SHUT OFF SERVICES IN YOUR HOME?: NO

## 2025-06-12 SDOH — SOCIAL STABILITY: SOCIAL INSECURITY: HAS ANYONE EVER THREATENED TO HURT YOUR FAMILY OR YOUR PETS?: NO

## 2025-06-12 SDOH — ECONOMIC STABILITY: FOOD INSECURITY: WITHIN THE PAST 12 MONTHS, THE FOOD YOU BOUGHT JUST DIDN'T LAST AND YOU DIDN'T HAVE MONEY TO GET MORE.: NEVER TRUE

## 2025-06-12 SDOH — SOCIAL STABILITY: SOCIAL INSECURITY: HAVE YOU HAD THOUGHTS OF HARMING ANYONE ELSE?: NO

## 2025-06-12 SDOH — SOCIAL STABILITY: SOCIAL INSECURITY: WITHIN THE LAST YEAR, HAVE YOU BEEN AFRAID OF YOUR PARTNER OR EX-PARTNER?: NO

## 2025-06-12 SDOH — SOCIAL STABILITY: SOCIAL INSECURITY: DOES ANYONE TRY TO KEEP YOU FROM HAVING/CONTACTING OTHER FRIENDS OR DOING THINGS OUTSIDE YOUR HOME?: NO

## 2025-06-12 SDOH — SOCIAL STABILITY: SOCIAL INSECURITY: HAVE YOU HAD ANY THOUGHTS OF HARMING ANYONE ELSE?: NO

## 2025-06-12 ASSESSMENT — ACTIVITIES OF DAILY LIVING (ADL)
LACK_OF_TRANSPORTATION: NO
ADEQUATE_TO_COMPLETE_ADL: YES
WALKS IN HOME: INDEPENDENT
PATIENT'S MEMORY ADEQUATE TO SAFELY COMPLETE DAILY ACTIVITIES?: YES
BATHING: INDEPENDENT
TOILETING: INDEPENDENT
FEEDING YOURSELF: INDEPENDENT
GROOMING: INDEPENDENT
ASSISTIVE_DEVICE: CANE;WALKER;EYEGLASSES
JUDGMENT_ADEQUATE_SAFELY_COMPLETE_DAILY_ACTIVITIES: YES
HEARING - LEFT EAR: FUNCTIONAL
HEARING - RIGHT EAR: FUNCTIONAL
DRESSING YOURSELF: INDEPENDENT

## 2025-06-12 ASSESSMENT — COGNITIVE AND FUNCTIONAL STATUS - GENERAL
MOBILITY SCORE: 24
PATIENT BASELINE BEDBOUND: NO
DAILY ACTIVITIY SCORE: 24

## 2025-06-12 ASSESSMENT — PAIN SCALES - GENERAL
PAINLEVEL_OUTOF10: 3
PAINLEVEL_OUTOF10: 0 - NO PAIN
PAINLEVEL_OUTOF10: 0 - NO PAIN

## 2025-06-12 ASSESSMENT — LIFESTYLE VARIABLES
AUDIT-C TOTAL SCORE: 1
HOW MANY STANDARD DRINKS CONTAINING ALCOHOL DO YOU HAVE ON A TYPICAL DAY: 1 OR 2
AUDIT-C TOTAL SCORE: 1
SKIP TO QUESTIONS 9-10: 1
HOW OFTEN DO YOU HAVE A DRINK CONTAINING ALCOHOL: MONTHLY OR LESS
HOW OFTEN DO YOU HAVE 6 OR MORE DRINKS ON ONE OCCASION: NEVER

## 2025-06-12 ASSESSMENT — PATIENT HEALTH QUESTIONNAIRE - PHQ9
SUM OF ALL RESPONSES TO PHQ9 QUESTIONS 1 & 2: 0
1. LITTLE INTEREST OR PLEASURE IN DOING THINGS: NOT AT ALL
2. FEELING DOWN, DEPRESSED OR HOPELESS: NOT AT ALL

## 2025-06-12 ASSESSMENT — PAIN - FUNCTIONAL ASSESSMENT
PAIN_FUNCTIONAL_ASSESSMENT: 0-10

## 2025-06-12 NOTE — CONSULTS
Reason For Consult  Possible small bowel obstruction    History Of Present Illness  Nusrat Irizarry is a 64 y.o. female presenting with complaints of abdominal cramping bloating nausea vomiting diaphoresis and burning in her chest which began yesterday.  She states the symptoms have improved and her abdomen does not really hurt anymore.  Patient came to the ER and he had a CT scan of the abdomen and chest.  This showed concern for a tethering transition point in the right lower quadrant.  Stomach is putting liquid in it and she still has some stool in her colon.  She states she tends to be chronically constipated due to her Parkinson's and some of the medicines.  Surgical consultation was obtained due to the possibility of small bowel obstruction.  As stated above she currently states she is having no discomfort.  She has been having bowel movements.  Previous abdominal operations include appendectomy as a child and hysterectomy.  I believe they took both ovaries.  She has had some orthopedic procedures done.  She states she has had intermittent discomfort like this before but this was the worst..     Past Medical History  She has a past medical history of Acute renal insufficiency (07/12/2023), Breast cancer, Disease of thyroid gland, Effusion of left knee (08/08/2023), antineoplastic chemo (May 2020), Other specified postprocedural states, Parkinson's disease (tremor, stiffness, slow motion, unstable posture) (Multi), Personal history of colonic polyps, Personal history of irradiation (August 2020), Personal history of other diseases of the circulatory system, Personal history of other endocrine, nutritional and metabolic disease, Personal history of other endocrine, nutritional and metabolic disease, Personal history of other malignant neoplasm of skin, and Personal history of other medical treatment.    Surgical History  She has a past surgical history that includes Appendectomy; Other surgical history  (10/02/2018); Incisional breast biopsy; Other surgical history (01/05/2023); Other surgical history (01/21/2022); Cardiac surgery (2013); Tonsillectomy (1968); Tubal ligation (1993); Hysterectomy (04/16/2018); Colonoscopy (11/18/2019); Other surgical history (12/08/2021); Other surgical history (08/23/2021); Other surgical history (09/01/2021); Other surgical history (09/23/2021); Other surgical history (06/17/2022); Other surgical history (11/17/2022); CT angio coronary art with heartflow if score >30% (09/16/2021); Lymphadenectomy; IR injection nerve block (Bilateral, 12/15/2023); IR injection epidural steroid (N/A, 03/01/2024); Breast lumpectomy (Right); Sacroiliac joint injection (Right, 04/26/2024); Radiofrequency ablation (Bilateral, 06/28/2024); NM injection (Left, 08/16/2024); NM injection (Bilateral, 09/27/2024); Radiofrequency ablation (Bilateral, 10/25/2024); Radiofrequency ablation (Bilateral, 03/12/2025); and Oophorectomy (4/16\2018).     Social History  She reports that she has never smoked. She has never used smokeless tobacco. She reports current alcohol use. She reports that she does not use drugs.    Family History  Family History[1]     Allergies  Bee venom protein (honey bee) and Zoledronic acid    Review of Systems  Constitutional:  no fever, sweats, and chills  Cardiovascular: No chest pain or palpitations  Respiratory: No cough or shortness of breath  Gastrointestinal: As in HPI.  She currently denies any symptoms  Genitourinary: no dysuria or urinary frequency  Musculoskeletal: no weakness or swelling  Integumentary: no rashes  Neurological: no confusion  Endocrine: no heat or cold intolerance  Heme/Lymph: no easy bruising or bleeding      Physical Exam  Constitutional: No acute distress, conversant, pleasant somewhat flat affect  Neurologic: Alert and oriented  Psych: Appropriate affect  Ears, Nose, Mouth and Throat: mucus membranes moist  Pulmonary: No labored breathing  Cardiovascular:  "Regular rate and rhythm  Abdomen: Soft, non-distended, non-tender  Musculoskeletal: Moves all extremities, no edema  Skin: No jaundice      Last Recorded Vitals  Blood pressure 132/89, pulse 87, temperature 36.6 °C (97.9 °F), temperature source Temporal, resp. rate 16, height 1.753 m (5' 9\"), weight 104 kg (230 lb), SpO2 95%.    Relevant Results  Results for orders placed or performed during the hospital encounter of 06/12/25 (from the past 24 hours)   CBC and Auto Differential   Result Value Ref Range    WBC 8.2 4.4 - 11.3 x10*3/uL    nRBC 0.0 0.0 - 0.0 /100 WBCs    RBC 4.20 4.00 - 5.20 x10*6/uL    Hemoglobin 12.6 12.0 - 16.0 g/dL    Hematocrit 37.6 36.0 - 46.0 %    MCV 90 80 - 100 fL    MCH 30.0 26.0 - 34.0 pg    MCHC 33.5 32.0 - 36.0 g/dL    RDW 12.6 11.5 - 14.5 %    Platelets 223 150 - 450 x10*3/uL    Neutrophils % 84.2 40.0 - 80.0 %    Immature Granulocytes %, Automated 0.1 0.0 - 0.9 %    Lymphocytes % 8.8 13.0 - 44.0 %    Monocytes % 6.6 2.0 - 10.0 %    Eosinophils % 0.2 0.0 - 6.0 %    Basophils % 0.1 0.0 - 2.0 %    Neutrophils Absolute 6.85 1.20 - 7.70 x10*3/uL    Immature Granulocytes Absolute, Automated 0.01 0.00 - 0.70 x10*3/uL    Lymphocytes Absolute 0.72 (L) 1.20 - 4.80 x10*3/uL    Monocytes Absolute 0.54 0.10 - 1.00 x10*3/uL    Eosinophils Absolute 0.02 0.00 - 0.70 x10*3/uL    Basophils Absolute 0.01 0.00 - 0.10 x10*3/uL   Comprehensive Metabolic Panel   Result Value Ref Range    Glucose 125 (H) 74 - 99 mg/dL    Sodium 140 136 - 145 mmol/L    Potassium 4.0 3.5 - 5.3 mmol/L    Chloride 104 98 - 107 mmol/L    Bicarbonate 26 21 - 32 mmol/L    Anion Gap 14 10 - 20 mmol/L    Urea Nitrogen 31 (H) 6 - 23 mg/dL    Creatinine 1.36 (H) 0.50 - 1.05 mg/dL    eGFR 43 (L) >60 mL/min/1.73m*2    Calcium 9.4 8.6 - 10.3 mg/dL    Albumin 4.3 3.4 - 5.0 g/dL    Alkaline Phosphatase 80 33 - 136 U/L    Total Protein 6.7 6.4 - 8.2 g/dL    AST 9 9 - 39 U/L    Bilirubin, Total 0.7 0.0 - 1.2 mg/dL    ALT 3 (L) 7 - 45 U/L "   Magnesium   Result Value Ref Range    Magnesium 1.96 1.60 - 2.40 mg/dL   Lipase   Result Value Ref Range    Lipase 10 9 - 82 U/L   aPTT   Result Value Ref Range    aPTT 37 (H) 26 - 36 seconds   Protime-INR   Result Value Ref Range    Protime 13.9 (H) 9.8 - 12.4 seconds    INR 1.3 (H) 0.9 - 1.1   Troponin I, High Sensitivity, Initial   Result Value Ref Range    Troponin I, High Sensitivity 5 0 - 13 ng/L   Troponin, High Sensitivity, 1 Hour   Result Value Ref Range    Troponin I, High Sensitivity 5 0 - 13 ng/L   Urinalysis with Reflex Culture and Microscopic   Result Value Ref Range    Color, Urine Yellow Light-Yellow, Yellow, Dark-Yellow    Appearance, Urine Clear Clear    Specific Gravity, Urine 1.032 1.005 - 1.035    pH, Urine 6.0 5.0, 5.5, 6.0, 6.5, 7.0, 7.5, 8.0    Protein, Urine 50 (1+) (A) NEGATIVE, 10 (TRACE), 20 (TRACE) mg/dL    Glucose, Urine Normal Normal mg/dL    Blood, Urine 1.0 (3+) (A) NEGATIVE mg/dL    Ketones, Urine TRACE (A) NEGATIVE mg/dL    Bilirubin, Urine NEGATIVE NEGATIVE mg/dL    Urobilinogen, Urine Normal Normal mg/dL    Nitrite, Urine NEGATIVE NEGATIVE    Leukocyte Esterase, Urine NEGATIVE NEGATIVE   Urinalysis Microscopic   Result Value Ref Range    WBC, Urine 1-5 1-5, NONE /HPF    RBC, Urine >20 (A) NONE, 1-2, 3-5 /HPF    Mucus, Urine 1+ Reference range not established. /LPF    Hyaline Casts, Urine 1+ (A) NONE /LPF     *Note: Due to a large number of results and/or encounters for the requested time period, some results have not been displayed. A complete set of results can be found in Results Review.    IMPRESSION:  Partial distal small-bowel obstruction with transition in the right  lower quadrant in distal ileum and probably the result of adhesions.      Unchanged compression deformity of L3     Assessment/Plan     We discussed small bowel obstructions as well as the fact these typically will resolve.  We also discussed the usage of NG tube although she is not vomiting and her bowel  does not look distended.  We discussed simply keeping her n.p.o. with ice chips and if she vomits then placing an NG tube.  I do not think she is obstructed due to the stool but we discussed getting enemas to help clear this out to make it easier for her bowels to move and she agrees.  She is on anticoagulants for her recent PE.  I explained to her that I would like to keep the anticoagulants going if she does not need emergency surgery and as we continue to evaluate her if it looks like she needs surgery we can then stop them.  She is in agreement.  I have encouraged her to ambulate and deep breathe.  We will follow clinically and perhaps occasionally check x-rays.  Latha Anna MD         [1]   Family History  Problem Relation Name Age of Onset    Heart disease Mother      Heart failure Father      Hyperthyroidism Sister      Lung cancer Sister      Breast cancer Other GRANDPARENT

## 2025-06-12 NOTE — SIGNIFICANT EVENT
Stopped by on evening rounds.  Patient resting comfortably in bed.  She denies any nausea, abdominal distention or abdominal pain.  She states she had a bowel movement.  Clinically she seems to have resolved what ever process was going on.  We will check an acute abdominal series in the morning and as long as it does not look obstructed, we will begin clear liquids and her diet can be advanced as tolerated.

## 2025-06-12 NOTE — CARE PLAN
The patient's goals for the shift include    Problem: Pain - Adult  Goal: Verbalizes/displays adequate comfort level or baseline comfort level  Outcome: Progressing     Problem: Safety - Adult  Goal: Free from fall injury  Outcome: Progressing     Problem: Discharge Planning  Goal: Discharge to home or other facility with appropriate resources  Outcome: Progressing     Problem: Chronic Conditions and Co-morbidities  Goal: Patient's chronic conditions and co-morbidity symptoms are monitored and maintained or improved  Outcome: Progressing     Problem: Nutrition  Goal: Nutrient intake appropriate for maintaining nutritional needs  Outcome: Progressing       The clinical goals for the shift include Pt will have a bowel movement by the end of this shift.    Over the shift, the patient did not make progress toward the following goals. Barriers to progression include partial small bowel obstruction. Recommendations to address these barriers include plana for multiple enemas and NPO status for bowel rest.

## 2025-06-12 NOTE — ED PROVIDER NOTES
"HPI   No chief complaint on file.      Limitations to History: None    HPI: 64-year-old female presents with fatigue.  States that last night throughout the night she was having abdominal cramping, bloating, nausea, diaphoresis, burning in her chest.  States that this morning she feels very \"drained\".  Patient is currently on Eliquis from recent pulmonary embolism that was found 1 month ago.  Denies any fever, chills, vomiting, urinary symptoms.    ------------------------------------------------------------------------------------------------------------------------------------------  Physical Exam:    VS: As documented in the triage note and EMR flowsheet from this visit were reviewed.    Appearance: Alert. cooperative,  in no acute distress.   Skin: Intact,  dry skin, no lesions, rash, petechiae or purpura.   Eyes: PERRLA, EOMs intact,  Conjunctiva pink with no redness or exudates.   HENT: Normocephalic, atraumatic. Nares patent. No intraoral lesions.   Neck: Supple, without meningismus. Trachea at midline. No lymphadenopathy.  Pulmonary: Clear bilaterally with good chest wall excursion. No rales, rhonchi or wheezing. No accessory muscle use or stridor.  Cardiac: Regular rate and rhythm, no rubs, murmurs, or gallops.   Abdomen: Abdomen is soft, nontender, and nondistended.  No palpable organomegaly.  No rebound or guarding.  No CVA tenderness. Nonsurgical abdomen.  Genitourinary: Exam deferred.  Musculoskeletal: Full range of motion.  Pulses full and equal. No cyanosis, clubbing, or edema.  Neurological:  Cranial nerves are grossly intact, grossly normal sensation, no weakness, no focal findings identified.  Psychiatric: Appropriate mood and affect.                Patient History   Medical History[1]  Surgical History[2]  Family History[3]  Social History[4]    Physical Exam   ED Triage Vitals   Temp Pulse Resp BP   -- -- -- --      SpO2 Temp src Heart Rate Source Patient Position   -- -- -- --      BP Location " DISPLAY PLAN FREE TEXT FiO2 (%)     -- --       Physical Exam      ED Course & MDM   Diagnoses as of 06/12/25 1301   Partial small bowel obstruction (Multi)   Acute renal insufficiency                 No data recorded                                 Medical Decision Making  Labs Reviewed  CBC WITH AUTO DIFFERENTIAL - Abnormal     WBC                           8.2                    nRBC                          0.0                    RBC                           4.20                   Hemoglobin                    12.6                   Hematocrit                    37.6                   MCV                           90                     MCH                           30.0                   MCHC                          33.5                   RDW                           12.6                   Platelets                     223                    Neutrophils %                 84.2                   Immature Granulocytes %, Automated   0.1                    Lymphocytes %                 8.8                    Monocytes %                   6.6                    Eosinophils %                 0.2                    Basophils %                   0.1                    Neutrophils Absolute          6.85                   Immature Granulocytes Absolute, Au*   0.01                   Lymphocytes Absolute          0.72 (*)               Monocytes Absolute            0.54                   Eosinophils Absolute          0.02                   Basophils Absolute            0.01                COMPREHENSIVE METABOLIC PANEL - Abnormal     Glucose                       125 (*)                Sodium                        140                    Potassium                     4.0                    Chloride                      104                    Bicarbonate                   26                     Anion Gap                     14                     Urea Nitrogen                 31 (*)                 Creatinine                    1.36 (*)                eGFR                          43 (*)                 Calcium                       9.4                    Albumin                       4.3                    Alkaline Phosphatase          80                     Total Protein                 6.7                    AST                           9                      Bilirubin, Total              0.7                    ALT                           3 (*)               APTT - Abnormal     aPTT                          37 (*)                     Narrative: The APTT is no longer used for monitoring Unfractionated Heparin Therapy. For monitoring Heparin Therapy, use the Heparin Assay.  PROTIME-INR - Abnormal     Protime                       13.9 (*)               INR                           1.3 (*)             URINALYSIS WITH REFLEX CULTURE AND MICROSCOPIC - Abnormal     Color, Urine                  Yellow                 Appearance, Urine             Clear                  Specific Gravity, Urine       1.032                  pH, Urine                     6.0                    Protein, Urine                50 (1+) (*)               Glucose, Urine                Normal                 Blood, Urine                  1.0 (3+) (*)               Ketones, Urine                TRACE (*)               Bilirubin, Urine              NEGATIVE                Urobilinogen, Urine           Normal                 Nitrite, Urine                NEGATIVE                Leukocyte Esterase, Urine     NEGATIVE             URINALYSIS MICROSCOPIC WITH REFLEX CULTURE - Abnormal     WBC, Urine                    1-5                    RBC, Urine                    >20 (*)                Mucus, Urine                  1+                     Hyaline Casts, Urine          1+ (*)              MAGNESIUM - Normal     Magnesium                     1.96                LIPASE - Normal     Lipase                        10                         Narrative: Venipuncture immediately after or during  the administration of Metamizole may lead to falsely low results. Testing should be performed immediately prior to Metamizole dosing.  SERIAL TROPONIN-INITIAL - Normal     Troponin I, High Sensitivity   5                          Narrative: Less than 99th percentile of normal range cutoff-                  Female and children under 18 years old <14 ng/L; Male <21 ng/L: Negative                  Repeat testing should be performed if clinically indicated.                                     Female and children under 18 years old 14-50 ng/L; Male 21-50 ng/L:                  Consistent with possible cardiac damage and possible increased clinical                   risk. Serial measurements may help to assess extent of myocardial damage.                                     >50 ng/L: Consistent with cardiac damage, increased clinical risk and                  myocardial infarction. Serial measurements may help assess extent of                   myocardial damage.                                      NOTE: Children less than 1 year old may have higher baseline troponin                   levels and results should be interpreted in conjunction with the overall                   clinical context.                                     NOTE: Troponin I testing is performed using a different                   testing methodology at [facility] than at other                   [facility]. Direct result comparisons should only                   be made within the same method.  SERIAL TROPONIN, 1 HOUR - Normal     Troponin I, High Sensitivity   5                          Narrative: Less than 99th percentile of normal range cutoff-                  Female and children under 18 years old <14 ng/L; Male <21 ng/L: Negative                  Repeat testing should be performed if clinically indicated.                                     Female and children under 18 years old 14-50 ng/L; Male 21-50 ng/L:                   Consistent with possible cardiac damage and possible increased clinical                   risk. Serial measurements may help to assess extent of myocardial damage.                                     >50 ng/L: Consistent with cardiac damage, increased clinical risk and                  myocardial infarction. Serial measurements may help assess extent of                   myocardial damage.                                      NOTE: Children less than 1 year old may have higher baseline troponin                   levels and results should be interpreted in conjunction with the overall                   clinical context.                                     NOTE: Troponin I testing is performed using a different                   testing methodology at [facility] than at other                   [facility]. Direct result comparisons should only                   be made within the same method.  TROPONIN SERIES- (INITIAL, 1 HR)         Narrative: The following orders were created for panel order Troponin I Series, High Sensitivity (0, 1 HR).                  Procedure                               Abnormality         Status                                     ---------                               -----------         ------                                     Troponin I, High Sensiti...[[id number]]  Normal              Final result                               Troponin, High Sensitivi...[[id number]]  Normal              Final result                                                 Please view results for these tests on the individual orders.  URINALYSIS WITH REFLEX CULTURE AND MICROSCOPIC         Narrative: The following orders were created for panel order Urinalysis with Reflex Culture and Microscopic.                  Procedure                               Abnormality         Status                                     ---------                               -----------         ------                  DISPLAY PLAN FREE TEXT DISPLAY PLAN FREE TEXT DISPLAY PLAN FREE TEXT DISPLAY PLAN FREE TEXT DISPLAY PLAN FREE TEXT DISPLAY PLAN FREE TEXT DISPLAY PLAN FREE TEXT                     Urinalysis with Reflex C...[[id number]]  Abnormal            Final result                               Extra Urine Gray Tube[[id number]]                            In process                                                   Please view results for these tests on the individual orders.  EXTRA URINE GRAY TUBE  CT angio chest for pulmonary embolism   Final Result    1.  Previous pulmonary embolic filling defects have resolved. No new    emboli are identified.    2. Small pericardial effusion is unchanged.                MACRO:    None          Signed by: Charlotte Girard 6/12/2025 12:20 PM    Dictation workstation:   [id number]     CT abdomen pelvis w IV contrast   Final Result    Partial distal small-bowel obstruction with transition in the right    lower quadrant in distal ileum and probably the result of adhesions.          Unchanged compression deformity of L3                MACRO:    None.          Signed by: Charlotte Girard 6/12/2025 12:28 PM    Dictation workstation:   [id number]     XR chest 1 view   Final Result    Streaky right mid and bibasilar opacities not evident on the    comparison radiograph favor atelectasis. Mild coarsening of the    pulmonary interstitium suggestive of chronic interstitial lung    disease/fibrosis accentuated by low lung volumes          MACRO:    None          Signed by: Otto Pickard 6/12/2025 9:30 AM    Dictation workstation:   [id number]     Medical Decision Making:    Patient appears well nontoxic.  Vital signs within normal limits.  Lab work reveals an acute renal insufficiency.  Treated with 1 L of normal saline.  CTA of the chest performed which shows resolution of pulmonary emboli.  Bloating and pain in her abdomen prompted CT of the abdomen pelvis shows partial small bowel obstruction.  Likely adhesional.  General surgery consulted who recommends enema, no NG tube, fluid resuscitation, ice chips and popsicles.  Case discussed with hospitalist who  is agreeable with admission and patient admitted in stable condition.    Differential Diagnoses Considered: ACS, electrolyte abnormality, volume depletion, intra-abdominal process, pulmonary embolism    Independent Interpretation of Studies:  I independently interpreted: Chest x-ray shows no evidence of pneumonia or pneumothorax.  CT of the chest without central pulmonary embolism.  CT of the abdomen pelvis shows no intra-abdominal free air.    Escalation of Care:  Appropriate for admission for further treatment and evaluation.    Discussion of Management with Other Providers:   I discussed the patient/results with: General Surgeon on-call, Dr. Caceres.  Admitting hospitalist.          Procedure  ECG 12 lead    Performed by: Sixto Barksdale DO  Authorized by: Sixto Barksdale DO    ECG interpreted by ED Physician in the absence of a cardiologist: yes    Comments:      EKG interpreted by Dr. Sixto Barksdale: Normal sinus rhythm at 83 bpm.  WY interval 178 ms.  QTc of 427 ms.  Nonspecific ST changes.  ECG 12 lead    Performed by: Sixto Barksdale DO  Authorized by: Sixto Barksdale DO    ECG interpreted by ED Physician in the absence of a cardiologist: yes    Comments:      Repeat EKG performed at 1122 and interpreted by Dr. Sixto Barksdale 1124: Normal sinus rhythm at 75 bpm.  WY interval 164 ms.  QTc of 442 ms.  Nonspecific ST changes.  No evolving changes.           [1]   Past Medical History:  Diagnosis Date    Acute renal insufficiency 07/12/2023    Breast cancer     Disease of thyroid gland     Effusion of left knee 08/08/2023    Hx antineoplastic chemo May 2020    Other specified postprocedural states     History of Papanicolaou smear    Parkinson's disease (tremor, stiffness, slow motion, unstable posture) (Multi)     Personal history of colonic polyps     Personal history of colonic polyps    Personal history of irradiation August 2020    Personal history of other diseases of the  circulatory system     History of hypertension    Personal history of other endocrine, nutritional and metabolic disease     History of hypothyroidism    Personal history of other endocrine, nutritional and metabolic disease     History of hyperglycemia    Personal history of other malignant neoplasm of skin     History of malignant neoplasm of skin    Personal history of other medical treatment     History of screening mammography   [2]   Past Surgical History:  Procedure Laterality Date    APPENDECTOMY      Appendectomy    BREAST LUMPECTOMY Right     CARDIAC SURGERY  2013    REPAIR OF HOLE IN HEART    COLONOSCOPY  11/18/2019    NORMAL; H/O COLONIC POLYPS IN PAST-REPEAT IN 5 YEARS    CT ANGIO CORONARY ART WITH HEARTFLOW IF SCORE >30%  09/16/2021    CT HEART CORONARY ANGIOGRAM 9/16/2021 [facility] ANCILLARY LEGACY    HYSTERECTOMY  04/16/2018    [facility]    INCISIONAL BREAST BIOPSY      Incisional Breast Biopsy    INJECTION Left 08/16/2024    Left Sacroiliac Joint Injection    INJECTION Bilateral 09/27/2024    superior Cluneal nerve block    IR INJECTION EPIDURAL STEROID N/A 03/01/2024    L5-S1    IR INJECTION NERVE BLOCK Bilateral 12/15/2023    Bilat L2/3 and L3/4 MBB    LYMPHADENECTOMY      axillary    OOPHORECTOMY  4/16\2018    OTHER SURGICAL HISTORY  10/02/2018    Bunion Correction By Osborne Procedure    OTHER SURGICAL HISTORY  01/05/2023    Intra-articular corticosteroid injection    OTHER SURGICAL HISTORY  01/21/2022    Epidural steroid injection    OTHER SURGICAL HISTORY  12/08/2021    Radiofrequency ablation    OTHER SURGICAL HISTORY  08/23/2021    Medial branch block    OTHER SURGICAL HISTORY  09/01/2021    Carpal tunnel surgery    OTHER SURGICAL HISTORY  09/23/2021    Medial branch block    OTHER SURGICAL HISTORY  06/17/2022    Epidural steroid injection    OTHER SURGICAL HISTORY  11/17/2022    Epidural steroid injection    RADIOFREQUENCY ABLATION Bilateral 06/28/2024    Bilat L3-5 MB RFA     RADIOFREQUENCY ABLATION Bilateral 10/25/2024    Bilat Superior Cluneal RFA    RADIOFREQUENCY ABLATION Bilateral 03/12/2025    Bilat L4/5-L5/S1 RFA    SACROILIAC JOINT INJECTION Right 04/26/2024    Rt SIJ    TONSILLECTOMY  1968    Tonsillectomy With Adenoidectomy    TUBAL LIGATION  1993    Tubal Ligation   [3]   Family History  Problem Relation Name Age of Onset    Heart disease Mother      Heart failure Father      Hyperthyroidism Sister      Lung cancer Sister      Breast cancer Other GRANDPARENT    [4]   Social History  Tobacco Use    Smoking status: Never    Smokeless tobacco: Never   Vaping Use    Vaping status: Never Used   Substance Use Topics    Alcohol use: Yes     Comment: occasional    Drug use: Never        Sixto Kirk, DO  06/12/25 [id number]

## 2025-06-12 NOTE — H&P
HISTORY AND PHYSICAL EXAMINATION    Name of the patient: Nusrat Irizarry  YOB: 1960  Age: 64 y.o.  Medical record number: [id number]    Date of service: 06/12/25  Time: 1:15 PM    CHIEF COMPLAINT: Abdominal pain, nausea.    HISTORY OF PRESENT ILLNESS:  This is a 64 years old female patient with past medical history as mentioned below presented to the emergency room because of abdominal pain and nausea.  Her symptoms started last night with abdominal pain, generalized, dull aching pain, constant pain, 8 out of 10 in severity, associated with nausea without vomiting.  This morning, she had a bowel movement and she decided to come to the emergency department.  She mentioned that currently, her pain subsided.  She stated that she has been having issues with bowel movement due to her Parkinson's medications.  Around 1 month ago, she was diagnosed with pulmonary embolism and she has been on Eliquis.  In the emergency department, she was afebrile, blood pressure and heart rate are stable, on room air.  Routine blood work was remarkable for BUN of 31, creatinine is 1.36, CBC was unremarkable.  Chest x-ray showed no obvious infiltrate or consolidation.  EKG revealed normal sinus rhythm without evidence of acute ischemic changes or cardiac arrhythmias.  Troponin was negative.  LFT and lipase were unremarkable.  CTA chest showed resolution of the previous pulmonary embolic filling defects, no new PE identified.  CT scan abdomen and pelvis showed partial distal small bowel obstruction with transition in the right lower quadrant and distal ileum.  Patient is being admitted for partial distal small bowel obstruction.    Medical History[1]  Surgical History[2]  Family History[3]  Social History     Socioeconomic History    Marital status:      Spouse name: Not on file    Number of children: Not on file    Years of education: Not on file    Highest education level: Not on file   Occupational History    Not on  file   Tobacco Use    Smoking status: Never    Smokeless tobacco: Never   Vaping Use    Vaping status: Never Used   Substance and Sexual Activity    Alcohol use: Yes     Comment: occasional    Drug use: Never    Sexual activity: Defer   Other Topics Concern    Not on file   Social History Narrative    Not on file     Social Drivers of Health     Financial Resource Strain: Low Risk  (5/13/2025)    Overall Financial Resource Strain (CARDIA)     Difficulty of Paying Living Expenses: Not hard at all   Food Insecurity: No Food Insecurity (5/12/2025)    Hunger Vital Sign     Worried About Running Out of Food in the Last Year: Never true     Ran Out of Food in the Last Year: Never true   Transportation Needs: No Transportation Needs (5/13/2025)    PRAPARE - Transportation     Lack of Transportation (Medical): No     Lack of Transportation (Non-Medical): No   Physical Activity: Inactive (5/12/2025)    Exercise Vital Sign     Days of Exercise per Week: 0 days     Minutes of Exercise per Session: 0 min   Stress: Not on file   Social Connections: Not on file   Intimate Partner Violence: Not At Risk (5/12/2025)    Humiliation, Afraid, Rape, and Kick questionnaire     Fear of Current or Ex-Partner: No     Emotionally Abused: No     Physically Abused: No     Sexually Abused: No   Housing Stability: Low Risk  (5/13/2025)    Housing Stability Vital Sign     Unable to Pay for Housing in the Last Year: No     Number of Times Moved in the Last Year: 0     Homeless in the Last Year: No   Recent Concern: Housing Stability - High Risk (5/12/2025)    Housing Stability Vital Sign     Unable to Pay for Housing in the Last Year: Yes     Number of Times Moved in the Last Year: 0     Homeless in the Last Year: No     Current Home medications: Medications Ordered Prior to Encounter[4]     Review of systems:  Constitutional:  Negative for chills, diaphoresis, fatigue and fever.   HENT:  Negative for congestion, ear discharge, ear pain, hearing  loss, rhinorrhea and sneezing.    Eyes:  Negative for pain, discharge and itching.   Respiratory: Negative for cough, chest tightness, shortness of breath. Negative for apnea, choking, wheezing and stridor.    Cardiovascular:  Negative for chest pain, palpitations and leg swelling.   Gastrointestinal: Reported abdominal pain, nausea, negative for , blood in stool, constipation, diarrhea.   Endocrine: Negative for cold intolerance, heat intolerance and polydipsia.   Genitourinary:  Negative for difficulty urinating, dysuria, flank pain, frequency and hematuria.   Musculoskeletal:  Negative for arthralgias, back pain and gait problem.   Neurological:  Negative for dizziness, seizures, syncope, facial asymmetry, speech difficulty, weakness and headaches.   Psychiatric/Behavioral:  Negative for behavioral problems, confusion and hallucinations.     Vital signs:  Visit Vitals  /89   Pulse 87   Temp 36.6 °C (97.9 °F) (Temporal)   Resp 16     Physical examination:  General: Awake, alert, oriented x3, no distress, cooperative.  HEENT: EOM intact, PERRLA.  Neck: Supple, no JVD, no masses, no lymphadenopathy.  Chest: Normal breath sounds bilateral, good chest expansion, no wheezes, no crackles, no rhonchi.  Heart: Regular rate and rhythm, S1-S2 normal, no murmur, no gallops.  Abdomen: Soft, nontender, no organomegaly, no ascites, no guarding or rigidity, hypoactive bowel sounds.  Neurological: Alert and oriented x3, cranial nerves are intact, normal power and tone of 4 limbs.  Skin: No lesions, no skin rash.  Warm and dry.  Musculoskeletal: Normal, atraumatic, no obvious deformities.  Legs: No leg edema, no clubbing or cyanosis.  Psych: Appropriate mood and behavior.    LABS:  Lab Results   Component Value Date    WBC 8.2 06/12/2025    HGB 12.6 06/12/2025    HCT 37.6 06/12/2025    MCV 90 06/12/2025     06/12/2025     Lab Results   Component Value Date    GLUCOSE 125 (H) 06/12/2025    CALCIUM 9.4 06/12/2025    NA  140 06/12/2025    K 4.0 06/12/2025    CO2 26 06/12/2025     06/12/2025    BUN 31 (H) 06/12/2025    CREATININE 1.36 (H) 06/12/2025     Lab Results   Component Value Date    ALT 3 (L) 06/12/2025    AST 9 06/12/2025    ALKPHOS 80 06/12/2025    BILITOT 0.7 06/12/2025     Imaging:    Procedure Component Value Units Date/Time   CT angio chest for pulmonary embolism [[id number]] Collected: 06/12/25 1221   Order Status: Completed Updated: 06/12/25 1221   Narrative:     Interpreted By:  Charlotte Girard,  STUDY:  CT ANGIO CHEST FOR PULMONARY EMBOLISM;  6/12/2025 12:07 pm      INDICATION:  Signs/Symptoms:chest burning, recent PE.          COMPARISON:  05/12/2025      ACCESSION NUMBER(S):  [id number]      ORDERING CLINICIAN:  ALMAS LAWSON      TECHNIQUE:  Helical data acquisition of the chest was obtained with intravenous  administration of 68 mL Omnipaque 350. Images were reformatted in  coronal and sagittal planes. Axial and coronal MIP images were  created and reviewed.      FINDINGS:  POTENTIAL LIMITATIONS OF THE STUDY: Mild motion artifact is present.      HEART AND VESSELS:  The previous embolic filling defects bilaterally are no longer  identified and there are no new filling defects noted.      Main pulmonary artery is mildly dilated at about 3.1 cm diameter.  Measurement is challenged by motion artifact.      The thoracic aorta is of normal course and caliber without vascular  calcifications.      No coronary artery calcifications are seen.The study is not optimized  for evaluation of coronary arteries.      The cardiac chambers are not enlarged.      Small pericardial effusion appears similar to the prior exam.      MEDIASTINUM AND ALESSANDRA, LOWER NECK AND AXILLA:  The visualized thyroid gland is within normal limits.      No evidence of thoracic lymphadenopathy by CT criteria.      Esophagus appears within normal limits as seen.      LUNGS AND AIRWAYS:  The trachea and central airways are patent. No  endobronchial lesion.      Linear densities in the right middle and upper lobes are unchanged  and probably reflects scar. Mild atelectasis is present in the  lingula. No consolidative pneumonia is identified. Mosaic density in  the lungs may reflect air trapping. There is no pleural effusion.      UPPER ABDOMEN:  CT abdomen pelvis was also performed and is reported separately.      CHEST WALL AND OSSEOUS STRUCTURES:  There are no suspicious osseous lesions. Multilevel degenerative  changes are present       Impression:     1.  Previous pulmonary embolic filling defects have resolved. No new  emboli are identified.  2. Small pericardial effusion is unchanged.          MACRO:  None      Signed by: Charlotte Girard 6/12/2025 12:20 PM  Dictation workstation:   [id number]   CT abdomen pelvis w IV contrast [[id number]] Collected: 06/12/25 1230   Order Status: Completed Updated: 06/12/25 1230   Narrative:     Interpreted By:  Charlotte Girard,  STUDY:  CT ABDOMEN PELVIS W IV CONTRAST; ;  6/12/2025 12:07 pm      INDICATION:  Signs/Symptoms:abdominal pain.          COMPARISON:  05/12/2025      ACCESSION NUMBER(S):  [id number]      ORDERING CLINICIAN:  ALMAS LAWSON      TECHNIQUE:  Imaging was performed from dome of the diaphragm through ischial  tuberosities with axial, coronal and sagittal images reconstructed.  Patient received 68 mL Omnipaque 350 intravenously in conjunction  with the separately reported CTA chest      FINDINGS:  CTA chest was also performed and is reported separately.      Subcentimeter parenchymal hypodensities are too small to characterize  and appear unchanged. There is no intra or extrahepatic biliary  dilatation. Gallbladder is contracted consistent with nonfasting  status. No calcified stones are visible.      No mass or inflammation is noted involving the pancreas.      Spleen is normal in size with no focal mass noted.      Adrenal glands appear normal. Kidneys enhance symmetrically with  no  hydronephrosis noted. 3.1 cm cyst extends from mid lateral aspect of  the left kidney. There also bilateral subcentimeter hypodensities  which are too small to characterize and are unchanged.      Stomach contains fluid and food material and is nondilated. Proximal  small bowel is nondilated. Mid and distal small bowel is fluid-filled  and mildly dilated. Transition between dilated and nondilated small  bowel occurs in the right lower quadrant in the distal ileum. Bowel  loops appear crowded and tethered in the area of transition  suggesting an area of adhesion. Appendix is not identified. Fecal  content is present throughout nondilated colon. There is no  pneumoperitoneum. A small amount of free fluid is present in the  pelvis and lower right pericolic gutter. No mesenteric inflammation  is identified.      Aorta and vena cava are normal in size. Patchy athero sclerotic  calcification is present. There are no pathologically enlarged  retroperitoneal, iliac or inguinal lymph nodes identified.      Urinary bladder is empty.      Uterus is absent.      No abdominal wall hernia is identified.      Compression deformity of L3 is unchanged. No acute or suspicious  osseous abnormality is identified.       Impression:     Partial distal small-bowel obstruction with transition in the right  lower quadrant in distal ileum and probably the result of adhesions.      Unchanged compression deformity of L3          MACRO:  None.      Signed by: Charlotte Sosaus 6/12/2025 12:28 PM  Dictation workstation:   [id number]   XR chest 1 view [[id number]] Collected: 06/12/25 0931   Order Status: Completed Updated: 06/12/25 0931   Narrative:     Interpreted By:  Otto Pickard,  STUDY:  XR CHEST 1 VIEW;  6/12/2025 9:15 am      INDICATION:  Signs/Symptoms:Chest Pain.          COMPARISON:  05/31/2020      ACCESSION NUMBER(S):  [id number]      ORDERING CLINICIAN:  ALMAS LAWSON      FINDINGS:  Single AP portable radiographic view of  the chest was provided.              CARDIOMEDIASTINAL SILHOUETTE:  Upper limits normal size of the cardiopericardial silhouette. Aortic  atherosclerosis.      LUNGS:  Streaky opacities in the right mid and bibasilar lungs. Low lung  volumes with coarsening of the pulmonary interstitium no pneumothorax  or pleural effusion.      ABDOMEN:  Grossly unremarkable.      BONES:  No acute osseous abnormality.       Impression:     Streaky right mid and bibasilar opacities not evident on the  comparison radiograph favor atelectasis. Mild coarsening of the  pulmonary interstitium suggestive of chronic interstitial lung  disease/fibrosis accentuated by low lung volumes      MACRO:  None      Signed by: Otto Pickard 6/12/2025 9:30 AM  Dictation workstation:   [id number]       Assessment/Plan   Assessment & Plan  Partial small bowel obstruction (Multi)    #1 partial distal small bowel obstruction: CT abdomen pelvis reviewed.  No vomiting currently.  Patient was evaluated by general surgery, recommended mineral oil enema and Fleet enema.  Plan: Admit to general floor, ambulate, n.p.o., okay for ice chips, sips of water and medications, IV fluids, IV morphine as needed, IV antiemetics, consult general surgery, Fleet enema, CBC and BMP tomorrow morning, KUB tomorrow morning.    #2 recent history of pulmonary embolism: CTA chest done today, showed resolution of recent PE, no new PE.  Plan to continue Eliquis for now.    #3 Parkinson's disease: Continue amantadine and Sinemet, continue Comtan.    #4 hypertension: Blood pressure stable, currently she is not on any antihypertensive medications.    #5 hypothyroidism: Continue levothyroxine.    #6 chronic pain syndrome/chronic back pain: IV morphine as needed, continue gabapentin, Tylenol as needed.    #7 DVT prophylaxis: Continue Eliquis.    Nafisa Beltran MD  06/12/25  3:58 PM         [1]   Past Medical History:  Diagnosis Date    Acute renal insufficiency 07/12/2023    Breast  cancer     Disease of thyroid gland     Effusion of left knee 08/08/2023    Hx antineoplastic chemo May 2020    Other specified postprocedural states     History of Papanicolaou smear    Parkinson's disease (tremor, stiffness, slow motion, unstable posture) (Multi)     Personal history of colonic polyps     Personal history of colonic polyps    Personal history of irradiation August 2020    Personal history of other diseases of the circulatory system     History of hypertension    Personal history of other endocrine, nutritional and metabolic disease     History of hypothyroidism    Personal history of other endocrine, nutritional and metabolic disease     History of hyperglycemia    Personal history of other malignant neoplasm of skin     History of malignant neoplasm of skin    Personal history of other medical treatment     History of screening mammography   [2]   Past Surgical History:  Procedure Laterality Date    APPENDECTOMY      Appendectomy    BREAST LUMPECTOMY Right     CARDIAC SURGERY  2013    REPAIR OF HOLE IN HEART    COLONOSCOPY  11/18/2019    NORMAL; H/O COLONIC POLYPS IN PAST-REPEAT IN 5 YEARS    CT ANGIO CORONARY ART WITH HEARTFLOW IF SCORE >30%  09/16/2021    CT HEART CORONARY ANGIOGRAM 9/16/2021 [facility] ANCILLARY LEGACY    HYSTERECTOMY  04/16/2018    [facility]    INCISIONAL BREAST BIOPSY      Incisional Breast Biopsy    INJECTION Left 08/16/2024    Left Sacroiliac Joint Injection    INJECTION Bilateral 09/27/2024    superior Cluneal nerve block    IR INJECTION EPIDURAL STEROID N/A 03/01/2024    L5-S1    IR INJECTION NERVE BLOCK Bilateral 12/15/2023    Bilat L2/3 and L3/4 MBB    LYMPHADENECTOMY      axillary    OOPHORECTOMY  4/16\2018    OTHER SURGICAL HISTORY  10/02/2018    Bunion Correction By Osborne Procedure    OTHER SURGICAL HISTORY  01/05/2023    Intra-articular corticosteroid injection    OTHER SURGICAL HISTORY  01/21/2022    Epidural steroid injection    OTHER SURGICAL HISTORY   12/08/2021    Radiofrequency ablation    OTHER SURGICAL HISTORY  08/23/2021    Medial branch block    OTHER SURGICAL HISTORY  09/01/2021    Carpal tunnel surgery    OTHER SURGICAL HISTORY  09/23/2021    Medial branch block    OTHER SURGICAL HISTORY  06/17/2022    Epidural steroid injection    OTHER SURGICAL HISTORY  11/17/2022    Epidural steroid injection    RADIOFREQUENCY ABLATION Bilateral 06/28/2024    Bilat L3-5 MB RFA    RADIOFREQUENCY ABLATION Bilateral 10/25/2024    Bilat Superior Cluneal RFA    RADIOFREQUENCY ABLATION Bilateral 03/12/2025    Bilat L4/5-L5/S1 RFA    SACROILIAC JOINT INJECTION Right 04/26/2024    Rt SIJ    TONSILLECTOMY  1968    Tonsillectomy With Adenoidectomy    TUBAL LIGATION  1993    Tubal Ligation   [3]   Family History  Problem Relation Name Age of Onset    Heart disease Mother      Heart failure Father      Hyperthyroidism Sister      Lung cancer Sister      Breast cancer Other GRANDPARENT    [4]   No current facility-administered medications on file prior to encounter.     Current Outpatient Medications on File Prior to Encounter   Medication Sig Dispense Refill    amantadine (Symmetrel) 100 mg capsule Take 1 capsule by mouth two times a day. 60 capsule 11    apixaban (Eliquis) 5 mg tablet Take 1 tablet (5 mg) by mouth 2 times a day. 60 tablet 5    aspirin 81 mg chewable tablet Chew 1 tablet (81 mg) once daily.      atorvastatin (Lipitor) 40 mg tablet Take 1 tablet (40 mg) by mouth once daily. 90 tablet 3    carbidopa-levodopa (Sinemet)  mg tablet Take 2 tablets by mouth five times a day. 8am,12pm, 6pm and 8pm and last dose upon waking up middle of the night around MIDNIGHT-2AM 900 tablet 3    entacapone (Comtan) 200 mg tablet Take 1 tablet by mouth four times daily. Take with levodopa/carbidopa 120 tablet 11    gabapentin (Neurontin) 300 mg capsule Take 1 capsule (300 mg) by mouth 4 times a day. 120 capsule 1    lactulose (Enulose) 20 gram/30 mL oral solution Take 15 mL by  mouth once daily at bedtime. 473 mL 3    letrozole (Femara) 2.5 mg tablet Take 1 tablet (2.5 mg total) by mouth once daily. 90 tablet 2    levothyroxine (Synthroid, Levoxyl) 50 mcg tablet Take 1.5 tab(s) on Monday, Wednesday, and Friday and 1 tab daily on all other days 145 tablet 3    acetaminophen (Tylenol) 500 mg tablet Take 1 tablet (500 mg) by mouth every 6 hours if needed for mild pain (1 - 3).      apixaban (Eliquis) 5 mg (74 tabs) tablet Take 2 tablets (10 mg) by mouth 2 times a day for 7 days, THEN 1 tablet (5 mg) 2 times a day. (Patient not taking: Reported on 6/12/2025) 74 tablet 0    diclofenac sodium (Voltaren) 1 % gel Apply 2 grams 4x/daily to affected area-left knee (Patient taking differently: 4 times a day as needed. Apply 2 grams 4x/daily to affected area-left knee) 100 g 1    nitroglycerin (Nitrostat) 0.4 mg SL tablet Place 1 tablet (0.4 mg) under the tongue every 5 minutes if needed for chest pain. After the second dose call 911 25 tablet 1    [DISCONTINUED] apixaban (Eliquis) 5 mg tablet Take 1 tablet (5 mg) by mouth 2 times a day. 60 tablet 5

## 2025-06-13 ENCOUNTER — APPOINTMENT (OUTPATIENT)
Dept: RADIOLOGY | Facility: HOSPITAL | Age: 65
DRG: 389 | End: 2025-06-13
Payer: MEDICARE

## 2025-06-13 VITALS
OXYGEN SATURATION: 95 % | HEIGHT: 69 IN | DIASTOLIC BLOOD PRESSURE: 88 MMHG | SYSTOLIC BLOOD PRESSURE: 135 MMHG | BODY MASS INDEX: 34.07 KG/M2 | TEMPERATURE: 97.9 F | WEIGHT: 230 LBS | HEART RATE: 68 BPM | RESPIRATION RATE: 17 BRPM

## 2025-06-13 LAB
ANION GAP SERPL CALC-SCNC: 10 MMOL/L (ref 10–20)
ATRIAL RATE: 75 BPM
ATRIAL RATE: 83 BPM
BUN SERPL-MCNC: 22 MG/DL (ref 6–23)
CALCIUM SERPL-MCNC: 8 MG/DL (ref 8.6–10.3)
CHLORIDE SERPL-SCNC: 108 MMOL/L (ref 98–107)
CO2 SERPL-SCNC: 25 MMOL/L (ref 21–32)
CREAT SERPL-MCNC: 0.91 MG/DL (ref 0.5–1.05)
EGFRCR SERPLBLD CKD-EPI 2021: 70 ML/MIN/1.73M*2
ERYTHROCYTE [DISTWIDTH] IN BLOOD BY AUTOMATED COUNT: 12.8 % (ref 11.5–14.5)
GLUCOSE SERPL-MCNC: 83 MG/DL (ref 74–99)
HCT VFR BLD AUTO: 33.8 % (ref 36–46)
HGB BLD-MCNC: 11.2 G/DL (ref 12–16)
MCH RBC QN AUTO: 30 PG (ref 26–34)
MCHC RBC AUTO-ENTMCNC: 33.1 G/DL (ref 32–36)
MCV RBC AUTO: 91 FL (ref 80–100)
NRBC BLD-RTO: 0 /100 WBCS (ref 0–0)
P AXIS: 26 DEGREES
P AXIS: 33 DEGREES
P OFFSET: 194 MS
P OFFSET: 204 MS
P ONSET: 129 MS
P ONSET: 145 MS
PLATELET # BLD AUTO: 192 X10*3/UL (ref 150–450)
POTASSIUM SERPL-SCNC: 4 MMOL/L (ref 3.5–5.3)
PR INTERVAL: 164 MS
PR INTERVAL: 178 MS
Q ONSET: 218 MS
Q ONSET: 227 MS
QRS COUNT: 12 BEATS
QRS COUNT: 13 BEATS
QRS DURATION: 100 MS
QRS DURATION: 90 MS
QT INTERVAL: 364 MS
QT INTERVAL: 396 MS
QTC CALCULATION(BAZETT): 427 MS
QTC CALCULATION(BAZETT): 442 MS
QTC FREDERICIA: 405 MS
QTC FREDERICIA: 426 MS
R AXIS: -24 DEGREES
R AXIS: -32 DEGREES
RBC # BLD AUTO: 3.73 X10*6/UL (ref 4–5.2)
SODIUM SERPL-SCNC: 139 MMOL/L (ref 136–145)
T AXIS: 6 DEGREES
T AXIS: 7 DEGREES
T OFFSET: 400 MS
T OFFSET: 425 MS
VENTRICULAR RATE: 75 BPM
VENTRICULAR RATE: 83 BPM
WBC # BLD AUTO: 5.3 X10*3/UL (ref 4.4–11.3)

## 2025-06-13 PROCEDURE — 85027 COMPLETE CBC AUTOMATED: CPT | Performed by: HOSPITALIST

## 2025-06-13 PROCEDURE — 74022 RADEX COMPL AQT ABD SERIES: CPT | Performed by: STUDENT IN AN ORGANIZED HEALTH CARE EDUCATION/TRAINING PROGRAM

## 2025-06-13 PROCEDURE — 80048 BASIC METABOLIC PNL TOTAL CA: CPT | Performed by: HOSPITALIST

## 2025-06-13 PROCEDURE — 2500000002 HC RX 250 W HCPCS SELF ADMINISTERED DRUGS (ALT 637 FOR MEDICARE OP, ALT 636 FOR OP/ED): Performed by: HOSPITALIST

## 2025-06-13 PROCEDURE — 2500000001 HC RX 250 WO HCPCS SELF ADMINISTERED DRUGS (ALT 637 FOR MEDICARE OP): Performed by: HOSPITALIST

## 2025-06-13 PROCEDURE — G0378 HOSPITAL OBSERVATION PER HR: HCPCS

## 2025-06-13 PROCEDURE — 99232 SBSQ HOSP IP/OBS MODERATE 35: CPT | Performed by: SURGERY

## 2025-06-13 PROCEDURE — 99238 HOSP IP/OBS DSCHRG MGMT 30/<: CPT | Performed by: HOSPITALIST

## 2025-06-13 PROCEDURE — 36415 COLL VENOUS BLD VENIPUNCTURE: CPT | Performed by: HOSPITALIST

## 2025-06-13 PROCEDURE — 74022 RADEX COMPL AQT ABD SERIES: CPT

## 2025-06-13 PROCEDURE — 2500000004 HC RX 250 GENERAL PHARMACY W/ HCPCS (ALT 636 FOR OP/ED): Performed by: HOSPITALIST

## 2025-06-13 RX ADMIN — ASPIRIN 81 MG: 81 TABLET, CHEWABLE ORAL at 08:33

## 2025-06-13 RX ADMIN — GABAPENTIN 300 MG: 300 CAPSULE ORAL at 06:40

## 2025-06-13 RX ADMIN — CARBIDOPA AND LEVODOPA 2 TABLET: 25; 100 TABLET ORAL at 06:39

## 2025-06-13 RX ADMIN — ENTACAPONE 200 MG: 200 TABLET ORAL at 06:40

## 2025-06-13 RX ADMIN — GABAPENTIN 300 MG: 300 CAPSULE ORAL at 12:57

## 2025-06-13 RX ADMIN — CARBIDOPA AND LEVODOPA 2 TABLET: 25; 100 TABLET ORAL at 12:56

## 2025-06-13 RX ADMIN — SODIUM CHLORIDE, POTASSIUM CHLORIDE, SODIUM LACTATE AND CALCIUM CHLORIDE 75 ML/HR: 600; 310; 30; 20 INJECTION, SOLUTION INTRAVENOUS at 04:37

## 2025-06-13 RX ADMIN — ENTACAPONE 200 MG: 200 TABLET ORAL at 12:57

## 2025-06-13 RX ADMIN — APIXABAN 5 MG: 5 TABLET, FILM COATED ORAL at 08:33

## 2025-06-13 RX ADMIN — AMANTADINE 100 MG: 100 CAPSULE ORAL at 08:33

## 2025-06-13 RX ADMIN — LEVOTHYROXINE SODIUM 75 MCG: 0.05 TABLET ORAL at 06:39

## 2025-06-13 ASSESSMENT — PAIN - FUNCTIONAL ASSESSMENT
PAIN_FUNCTIONAL_ASSESSMENT: 0-10
PAIN_FUNCTIONAL_ASSESSMENT: 0-10

## 2025-06-13 ASSESSMENT — COGNITIVE AND FUNCTIONAL STATUS - GENERAL
DAILY ACTIVITIY SCORE: 24
MOBILITY SCORE: 24

## 2025-06-13 ASSESSMENT — ACTIVITIES OF DAILY LIVING (ADL): LACK_OF_TRANSPORTATION: NO

## 2025-06-13 ASSESSMENT — PAIN SCALES - GENERAL
PAINLEVEL_OUTOF10: 0 - NO PAIN
PAINLEVEL_OUTOF10: 0 - NO PAIN

## 2025-06-13 NOTE — DISCHARGE SUMMARY
Discharge summary.    Nusrat Irizarry  64 y.o.  1960  [id number]    Date of admission: 6/12/2025   Date of discharge:  6/13/2025.    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[facility]) 07/12/2023    Polyarthropathy 07/12/2023    Pericarditis ([facility]) 07/12/2023    Pericardial effusion ([facility]) 07/12/2023    Parkinson's disease 07/12/2023    Osteopenia 07/12/2023    Orthostatic hypotension 07/12/2023    Class 1 obesity due to excess calories with serious comorbidity and body mass index (BMI) of 33.0 to 33.9 in adult 07/12/2023    Numbness and tingling in both hands 07/12/2023    Nodule of left lung 07/12/2023    Neuropathy 07/12/2023    Mitral valve prolapse 07/12/2023    Liver lesion 07/12/2023    Kidney lesion 07/12/2023    Glucose intolerance (impaired glucose tolerance) 07/12/2023    Hypercholesterolemia with hypertriglyceridemia 07/12/2023    History of carpal tunnel syndrome 07/12/2023    Disc degeneration, lumbar 07/12/2023    Closed fracture of shaft of fifth metacarpal bone 07/12/2023    Closed fracture of head of radius 07/12/2023    Chronic low back pain 07/12/2023    Chronic constipation 07/12/2023    Carpal tunnel syndrome, left 07/12/2023    Carpal tunnel syndrome of right wrist 07/12/2023    Benign essential hypertension  07/12/2023    Personal history of breast cancer 06/16/2020    Hypothyroidism due to acquired atrophy of thyroid 05/17/2020    GERD (gastroesophageal reflux disease) 05/17/2020    Recurrent syncope 05/17/2020    Postmenopausal bleeding 02/28/2018    Calcific tendonitis of left shoulder 11/16/2017    Calcific tendonitis of right shoulder 05/16/2017    Ostium secundum atrial septal defect ([facility]) 02/28/2013     Discharge medications:     Your medication list        CONTINUE taking these medications        Instructions Last Dose Given Next Dose Due   acetaminophen 500 mg tablet  Commonly known as: Tylenol           amantadine 100 mg capsule  Commonly known as: Symmetrel      Take 1 capsule by mouth two times a day.       aspirin 81 mg chewable tablet           atorvastatin 40 mg tablet  Commonly known as: Lipitor      Take 1 tablet (40 mg) by mouth once daily.       carbidopa-levodopa  mg tablet  Commonly known as: Sinemet      Take 2 tablets by mouth five times a day. 8am,12pm, 6pm and 8pm and last dose upon waking up middle of the night around MIDNIGHT-2AM       diclofenac sodium 1 % gel  Commonly known as: Voltaren      Apply 2 grams 4x/daily to affected area-left knee       apixaban 5 mg tablet  Commonly known as: Eliquis      Take 1 tablet (5 mg) by mouth 2 times a day.       entacapone 200 mg tablet  Commonly known as: Comtan      Take 1 tablet by mouth four times daily. Take with levodopa/carbidopa       Enulose 10 gram/15 mL oral solution  Generic drug: lactulose      Take 15 mL by mouth once daily at bedtime.       gabapentin 300 mg capsule  Commonly known as: Neurontin      Take 1 capsule (300 mg) by mouth 4 times a day.       letrozole 2.5 mg tablet  Commonly known as: Femara      Take 1 tablet (2.5 mg total) by mouth once daily.       levothyroxine 50 mcg tablet  Commonly known as: Synthroid, Levoxyl      Take 1.5 tab(s) on Monday, Wednesday, and Friday and 1 tab daily on all other days        nitroglycerin 0.4 mg SL tablet  Commonly known as: Nitrostat      Place 1 tablet (0.4 mg) under the tongue every 5 minutes if needed for chest pain. After the second dose call 911       polyethylene glycol 17 gram packet  Commonly known as: Glycolax, Miralax                  ASK your doctor about these medications        Instructions Last Dose Given Next Dose Due   Eliquis DVT-PE Treat 30D Start 5 mg (74 tabs) tablet  Generic drug: apixaban  Start taking on: May 13, 2025      Take 2 tablets (10 mg) by mouth 2 times a day for 7 days, THEN 1 tablet (5 mg) 2 times a day.              Hospital Course.  This is a 64 years old female patient presented to the emergency room because of abdominal pain and nausea for 1 day duration.  Pain was dull aching pain, 8 out of 10 in severity, not radiating, associated with nausea and vomiting.  On the day of admission, patient had bowel movement before she came to the emergency department.  In the emergency department, her vital signs are stable.  Her routine blood work was remarkable for BUN of 31 and creatinine of 1.36 consistent with dehydration.  Acute kidney injury ruled out.  CBC was unremarkable.  CT scan abdomen and pelvis done and showed partial distal small bowel obstruction with transition in the right lower quadrant in the distal ileum.  Patient had recent history of pulmonary embolism and she has been on Eliquis.  CTA chest done and showed that previous pulmonary embolic filling defects have resolved, no new PE identified.  Patient was admitted to the floor, kept on n.p.o. overnight, started on IV fluids and IV antiemetics.  General surgery consulted and recommended to continue conservative treatment.  On the day of discharge, patient symptoms improved, abdominal pain resolved and she had no more nausea.  She was started on clear liquids and she tolerated it very well.  KUB was done on 6/13/2025 and showed small caliber of bowel loops in keeping with small bowel  "obstruction.  Patient remained asymptomatic, abdominal pain resolved and she tolerated diet as below.  General surgery advance the patient's diet to regular diet and also patient was able to tolerated.  She had no more abdominal pain, no more nausea or vomiting.  Patient discharged home in a stable medical condition, discharged on the same previous home medications without any changes, discharged on regular diet, instructed to follow-up with PCP in 1 week.    Vital signs:  Visit Vitals  /88 (BP Location: Left arm, Patient Position: Lying)   Pulse 68   Temp 36.6 °C (97.9 °F) (Oral)   Resp 17   Ht 1.753 m (5' 9\")   Wt 104 kg (230 lb)   LMP  (LMP Unknown)   SpO2 95%   BMI 33.97 kg/m²   OB Status Hysterectomy   Smoking Status Never   BSA 2.25 m²     Discharge physical exam:  Physical Exam:  General: Awake, alert, oriented x3, no distress, cooperative.  HEENT: EOM intact, PERRLA.  Neck: Supple, no JVD, no masses, no lymphadenopathy.  Chest: Normal breath sounds bilateral, good chest expansion, no wheezes, no crackles, no rhonchi.  Heart: Regular rate and rhythm, S1-S2 normal, no murmur, no gallops.  Abdomen: Soft, nontender, no organomegaly, no ascites, no guarding or rigidity.  Neurological: Alert and oriented x3, cranial nerves are intact, normal power and tone of 4 limbs.  Skin: No lesions, no skin rash.  Warm and dry.  Musculoskeletal: Normal, atraumatic, no obvious deformities.  Legs: No leg edema, no clubbing or cyanosis.  Psych: Appropriate mood and behavior.    Labs:  Lab Results   Component Value Date    WBC 5.3 06/13/2025    HGB 11.2 (L) 06/13/2025    HCT 33.8 (L) 06/13/2025    MCV 91 06/13/2025     06/13/2025     Lab Results   Component Value Date    GLUCOSE 83 06/13/2025    CALCIUM 8.0 (L) 06/13/2025     06/13/2025    K 4.0 06/13/2025    CO2 25 06/13/2025     (H) 06/13/2025    BUN 22 06/13/2025    CREATININE 0.91 06/13/2025     Lab Results   Component Value Date    ALT 3 (L) " 06/12/2025    AST 9 06/12/2025    ALKPHOS 80 06/12/2025    BILITOT 0.7 06/12/2025     Imaging studies:  ProcedureComponentValueUnitsDate/TimeXR abdomen 2 views w chest 1 view [[id number]]Collected: 06/13/25 0715Order Status: CompletedUpdated: 06/13/25 0715Narrative:  Interpreted By:  Jesus Pham,  STUDY:  XR ABDOMEN 2 VIEWS WITH CHEST 1 VIEW;  6/13/2025 5:36 am      INDICATION:  Signs/Symptoms:SBO.      COMPARISON:  CT abdomen and pelvis 06/12/2025      ACCESSION NUMBER(S):  [id number]      ORDERING CLINICIAN:  YESSI MEYERS      TECHNIQUE:  Abdomen supine and upright views      Chest PA view      FINDINGS:  Bilateral areas of atelectasis/scarring. No new consolidations. No  pleural effusion or pneumothorax.      Cardiomediastinal contours unchanged.      No acute osseous abnormalities. Surgical clips in the right axilla.      No pneumoperitoneum. Redemonstration of few mildly dilated small  bowel loops with water fluid levels predominantly in the right lower  quadrant in keeping with the history of SBO. The largest loop of  bowel measures up to 3.3 cm. Above average stool burden.      Pelvic ring is intact.    Impression:  Similar caliber of bowel loops compared to 06/12/2025 in keeping with  known history of small-bowel obstruction.      No new findings detected.      MACRO:  None      Signed by: Jesus Pham 6/13/2025 7:14 AM  Dictation workstation:   [id number] angio chest for pulmonary embolism [[id number]]Collected: 06/12/25 1221Order Status: CompletedUpdated: 06/12/25 1221Narrative:  Interpreted By:  Charlotte Girard,  STUDY:  CT ANGIO CHEST FOR PULMONARY EMBOLISM;  6/12/2025 12:07 pm      INDICATION:  Signs/Symptoms:chest burning, recent PE.          COMPARISON:  05/12/2025      ACCESSION NUMBER(S):  [id number]      ORDERING CLINICIAN:  ALMAS LAWSON      TECHNIQUE:  Helical data acquisition of the chest was obtained with intravenous  administration of 68 mL Omnipaque 350. Images were  reformatted in  coronal and sagittal planes. Axial and coronal MIP images were  created and reviewed.      FINDINGS:  POTENTIAL LIMITATIONS OF THE STUDY: Mild motion artifact is present.      HEART AND VESSELS:  The previous embolic filling defects bilaterally are no longer  identified and there are no new filling defects noted.      Main pulmonary artery is mildly dilated at about 3.1 cm diameter.  Measurement is challenged by motion artifact.      The thoracic aorta is of normal course and caliber without vascular  calcifications.      No coronary artery calcifications are seen.The study is not optimized  for evaluation of coronary arteries.      The cardiac chambers are not enlarged.      Small pericardial effusion appears similar to the prior exam.      MEDIASTINUM AND ALESSANDRA, LOWER NECK AND AXILLA:  The visualized thyroid gland is within normal limits.      No evidence of thoracic lymphadenopathy by CT criteria.      Esophagus appears within normal limits as seen.      LUNGS AND AIRWAYS:  The trachea and central airways are patent. No endobronchial lesion.      Linear densities in the right middle and upper lobes are unchanged  and probably reflects scar. Mild atelectasis is present in the  lingula. No consolidative pneumonia is identified. Mosaic density in  the lungs may reflect air trapping. There is no pleural effusion.      UPPER ABDOMEN:  CT abdomen pelvis was also performed and is reported separately.      CHEST WALL AND OSSEOUS STRUCTURES:  There are no suspicious osseous lesions. Multilevel degenerative  changes are present    Impression:  1.  Previous pulmonary embolic filling defects have resolved. No new  emboli are identified.  2. Small pericardial effusion is unchanged.          MACRO:  None      Signed by: Charlotte Girard 6/12/2025 12:20 PM  Dictation workstation:   [id number] abdomen pelvis w IV contrast [[id number]]Collected: 06/12/25 1230Order Status: CompletedUpdated: 06/12/25 1230Narrative:   Interpreted By:  Charlotte Girard,  STUDY:  CT ABDOMEN PELVIS W IV CONTRAST; ;  6/12/2025 12:07 pm      INDICATION:  Signs/Symptoms:abdominal pain.          COMPARISON:  05/12/2025      ACCESSION NUMBER(S):  [id number]      ORDERING CLINICIAN:  ALMAS LAWSON      TECHNIQUE:  Imaging was performed from dome of the diaphragm through ischial  tuberosities with axial, coronal and sagittal images reconstructed.  Patient received 68 mL Omnipaque 350 intravenously in conjunction  with the separately reported CTA chest      FINDINGS:  CTA chest was also performed and is reported separately.      Subcentimeter parenchymal hypodensities are too small to characterize  and appear unchanged. There is no intra or extrahepatic biliary  dilatation. Gallbladder is contracted consistent with nonfasting  status. No calcified stones are visible.      No mass or inflammation is noted involving the pancreas.      Spleen is normal in size with no focal mass noted.      Adrenal glands appear normal. Kidneys enhance symmetrically with no  hydronephrosis noted. 3.1 cm cyst extends from mid lateral aspect of  the left kidney. There also bilateral subcentimeter hypodensities  which are too small to characterize and are unchanged.      Stomach contains fluid and food material and is nondilated. Proximal  small bowel is nondilated. Mid and distal small bowel is fluid-filled  and mildly dilated. Transition between dilated and nondilated small  bowel occurs in the right lower quadrant in the distal ileum. Bowel  loops appear crowded and tethered in the area of transition  suggesting an area of adhesion. Appendix is not identified. Fecal  content is present throughout nondilated colon. There is no  pneumoperitoneum. A small amount of free fluid is present in the  pelvis and lower right pericolic gutter. No mesenteric inflammation  is identified.      Aorta and vena cava are normal in size. Patchy athero sclerotic  calcification is present.  There are no pathologically enlarged  retroperitoneal, iliac or inguinal lymph nodes identified.      Urinary bladder is empty.      Uterus is absent.      No abdominal wall hernia is identified.      Compression deformity of L3 is unchanged. No acute or suspicious  osseous abnormality is identified.    Impression:  Partial distal small-bowel obstruction with transition in the right  lower quadrant in distal ileum and probably the result of adhesions.      Unchanged compression deformity of L3          MACRO:  None.      Signed by: Charlotte Girard 6/12/2025 12:28 PM  Dictation workstation:   [id number] chest 1 view [[id number]]Collected: 06/12/25 0931Order Status: CompletedUpdated: 06/12/25 0931Narrative:  Interpreted By:  Otto Pickard,  STUDY:  XR CHEST 1 VIEW;  6/12/2025 9:15 am      INDICATION:  Signs/Symptoms:Chest Pain.          COMPARISON:  05/31/2020      ACCESSION NUMBER(S):  [id number]      ORDERING CLINICIAN:  ALMAS LAWSON      FINDINGS:  Single AP portable radiographic view of the chest was provided.              CARDIOMEDIASTINAL SILHOUETTE:  Upper limits normal size of the cardiopericardial silhouette. Aortic  atherosclerosis.      LUNGS:  Streaky opacities in the right mid and bibasilar lungs. Low lung  volumes with coarsening of the pulmonary interstitium no pneumothorax  or pleural effusion.      ABDOMEN:  Grossly unremarkable.      BONES:  No acute osseous abnormality.    Impression:  Streaky right mid and bibasilar opacities not evident on the  comparison radiograph favor atelectasis. Mild coarsening of the  pulmonary interstitium suggestive of chronic interstitial lung  disease/fibrosis accentuated by low lung volumes      MACRO:  None      Signed by: Otto Pickard 6/12/2025 9:30 AM  Dictation workstation:   [id number].    Disposition: Home self-care.    Time spent in discharge and discharge summary is 28 minutes.    Nafisa Beltran MD  06/13/25  4:20 PM

## 2025-06-13 NOTE — PROGRESS NOTES
06/13/25 1230   Discharge Planning   Living Arrangements Spouse/significant other   Support Systems Spouse/significant other;Family members   Assistance Needed none   Type of Residence Private residence   Number of Stairs to Enter Residence 0  (ramp)   Number of Stairs Within Residence 0   Do you have animals or pets at home? Yes   Type of Animals or Pets 1 cat   Who is requesting discharge planning? Provider   Home or Post Acute Services None   Expected Discharge Disposition Home   Does the patient need discharge transport arranged? No   Financial Resource Strain   How hard is it for you to pay for the very basics like food, housing, medical care, and heating? Not very   Housing Stability   In the last 12 months, was there a time when you were not able to pay the mortgage or rent on time? N   In the past 12 months, how many times have you moved where you were living? 0   At any time in the past 12 months, were you homeless or living in a shelter (including now)? N   Transportation Needs   In the past 12 months, has lack of transportation kept you from medical appointments or from getting medications? no   In the past 12 months, has lack of transportation kept you from meetings, work, or from getting things needed for daily living? No   Stroke Family Assessment   Stroke Family Assessment Needed No   Intensity of Service   Intensity of Service 0-30 min     Met with pt at the bedside and verified address, phone number and emergency contact information. PCP is Fred last seen in May and preferred pharmacy is [facility], denies issues obtaining or affording medications and takes as ordered. Pt is independent with a rollator or cane. She also uses a shower chair and grab bars and they have a ramp to get into their home. She lives at home with her  and feels safe. Plan is to return home no new needs at this time[facility] hrs. Care Transitions to follow. Beverly Guevara BSN/RN-TCC

## 2025-06-13 NOTE — CARE PLAN
The patient's goals for the shift include  rest and comfort    The clinical goals for the shift include maintain patient safety    Over the shift, the patient did  make progress toward the following goals. Barriers to progression include none. Recommendations to address these barriers include none.

## 2025-06-13 NOTE — CARE PLAN
The patient's goals for the shift include  have BM    The clinical goals for the shift include maintain patient safety    Over the shift, the patient did not make progress toward the following goals. Barriers to progression include  Problem: Pain - Adult  Goal: Verbalizes/displays adequate comfort level or baseline comfort level  Outcome: Progressing  Flowsheets (Taken 6/13/2025 1206)  Verbalizes/displays adequate comfort level or baseline comfort level: Encourage patient to monitor pain and request assistance     Problem: Discharge Planning  Goal: Discharge to home or other facility with appropriate resources  Outcome: Progressing  Flowsheets (Taken 6/13/2025 1206)  Discharge to home or other facility with appropriate resources: Identify barriers to discharge with patient and caregiver     Problem: Chronic Conditions and Co-morbidities  Goal: Patient's chronic conditions and co-morbidity symptoms are monitored and maintained or improved  Outcome: Progressing  Flowsheets (Taken 6/13/2025 1206)  Care Plan - Patient's Chronic Conditions and Co-Morbidity Symptoms are Monitored and Maintained or Improved: Monitor and assess patient's chronic conditions and comorbid symptoms for stability, deterioration, or improvement     Problem: Nutrition  Goal: Nutrient intake appropriate for maintaining nutritional needs  Outcome: Progressing   Recommendations to address these barriers include

## 2025-06-13 NOTE — PROGRESS NOTES
"Nusrat Irizarry is a 64 y.o. female on day 1 of admission presenting with Partial small bowel obstruction (Multi).    Subjective   Patient continues to deny any pain, nausea or distention.  She states her bowels have been moving.       Objective     Physical Exam  Constitutional:       General: She is not in acute distress.     Appearance: She is not toxic-appearing.   Eyes:      General: No scleral icterus.     Extraocular Movements: Extraocular movements intact.      Pupils: Pupils are equal, round, and reactive to light.   Cardiovascular:      Rate and Rhythm: Normal rate.   Pulmonary:      Effort: Pulmonary effort is normal. No respiratory distress.   Abdominal:      Palpations: Abdomen is soft.      Tenderness: There is no abdominal tenderness.   Musculoskeletal:         General: No swelling.   Neurological:      General: No focal deficit present.      Mental Status: She is alert and oriented to person, place, and time.   Psychiatric:         Mood and Affect: Mood normal.         Last Recorded Vitals  Blood pressure 115/75, pulse 71, temperature 35.8 °C (96.4 °F), resp. rate 16, height 1.753 m (5' 9\"), weight 104 kg (230 lb), SpO2 93%.  Intake/Output last 3 Shifts:  I/O last 3 completed shifts:  In: 1669.6 (16 mL/kg) [I.V.:669.6 (6.4 mL/kg); IV Piggyback:1000]  Out: - (0 mL/kg)   Weight: 104.3 kg     Relevant Results  Results for orders placed or performed during the hospital encounter of 06/12/25 (from the past 24 hours)   Troponin, High Sensitivity, 1 Hour   Result Value Ref Range    Troponin I, High Sensitivity 5 0 - 13 ng/L   Urinalysis with Reflex Culture and Microscopic   Result Value Ref Range    Color, Urine Yellow Light-Yellow, Yellow, Dark-Yellow    Appearance, Urine Clear Clear    Specific Gravity, Urine 1.032 1.005 - 1.035    pH, Urine 6.0 5.0, 5.5, 6.0, 6.5, 7.0, 7.5, 8.0    Protein, Urine 50 (1+) (A) NEGATIVE, 10 (TRACE), 20 (TRACE) mg/dL    Glucose, Urine Normal Normal mg/dL    Blood, Urine 1.0 " (3+) (A) NEGATIVE mg/dL    Ketones, Urine TRACE (A) NEGATIVE mg/dL    Bilirubin, Urine NEGATIVE NEGATIVE mg/dL    Urobilinogen, Urine Normal Normal mg/dL    Nitrite, Urine NEGATIVE NEGATIVE    Leukocyte Esterase, Urine NEGATIVE NEGATIVE   Extra Urine Gray Tube   Result Value Ref Range    Extra Tube Hold for add-ons.    Urinalysis Microscopic   Result Value Ref Range    WBC, Urine 1-5 1-5, NONE /HPF    RBC, Urine >20 (A) NONE, 1-2, 3-5 /HPF    Mucus, Urine 1+ Reference range not established. /LPF    Hyaline Casts, Urine 1+ (A) NONE /LPF   ECG 12 lead   Result Value Ref Range    Ventricular Rate 83 BPM    Atrial Rate 83 BPM    NJ Interval 178 ms    QRS Duration 90 ms    QT Interval 364 ms    QTC Calculation(Bazett) 427 ms    P Axis 26 degrees    R Axis -32 degrees    T Axis 6 degrees    QRS Count 13 beats    Q Onset 218 ms    P Onset 129 ms    P Offset 194 ms    T Offset 400 ms    QTC Fredericia 405 ms   ECG 12 lead   Result Value Ref Range    Ventricular Rate 75 BPM    Atrial Rate 75 BPM    NJ Interval 164 ms    QRS Duration 100 ms    QT Interval 396 ms    QTC Calculation(Bazett) 442 ms    P Axis 33 degrees    R Axis -24 degrees    T Axis 7 degrees    QRS Count 12 beats    Q Onset 227 ms    P Onset 145 ms    P Offset 204 ms    T Offset 425 ms    QTC Fredericia 426 ms   CBC   Result Value Ref Range    WBC 5.3 4.4 - 11.3 x10*3/uL    nRBC 0.0 0.0 - 0.0 /100 WBCs    RBC 3.73 (L) 4.00 - 5.20 x10*6/uL    Hemoglobin 11.2 (L) 12.0 - 16.0 g/dL    Hematocrit 33.8 (L) 36.0 - 46.0 %    MCV 91 80 - 100 fL    MCH 30.0 26.0 - 34.0 pg    MCHC 33.1 32.0 - 36.0 g/dL    RDW 12.8 11.5 - 14.5 %    Platelets 192 150 - 450 x10*3/uL   Basic Metabolic Panel   Result Value Ref Range    Glucose 83 74 - 99 mg/dL    Sodium 139 136 - 145 mmol/L    Potassium 4.0 3.5 - 5.3 mmol/L    Chloride 108 (H) 98 - 107 mmol/L    Bicarbonate 25 21 - 32 mmol/L    Anion Gap 10 10 - 20 mmol/L    Urea Nitrogen 22 6 - 23 mg/dL    Creatinine 0.91 0.50 - 1.05 mg/dL     eGFR 70 >60 mL/min/1.73m*2    Calcium 8.0 (L) 8.6 - 10.3 mg/dL     *Note: Due to a large number of results and/or encounters for the requested time period, some results have not been displayed. A complete set of results can be found in Results Review.      I have looked at the abdominal films and I think they look improved.  There is still some air-fluid levels and some mildly distended loops.  Overall I think it looks improved.  Assessment & Plan  Partial small bowel obstruction (Multi)    Patient seems to be clinically improving.  I have ordered clear liquids.  If she tolerates this I would advance her diet as tolerated.  If she fails a trial of feeding, we can do a Gastrografin challenge over the weekend.  I think she will do fine.    Latha Anna MD

## 2025-06-16 ENCOUNTER — PATIENT OUTREACH (OUTPATIENT)
Dept: PRIMARY CARE | Facility: CLINIC | Age: 65
End: 2025-06-16
Payer: MEDICARE

## 2025-06-16 PROCEDURE — RXMED WILLOW AMBULATORY MEDICATION CHARGE

## 2025-06-16 NOTE — PROGRESS NOTES
Discharge Facility: [facility]  Discharge Diagnosis: Partial small bowel obstruction  Admission Date: 6/12/2025  Discharge Date: 6/13/2025    PCP Appointment Date:  -6/18/2025 1500    Hospital Encounter and Summary Linked: Yes    ED to Hosp-Admission (Discharged) with Nafisa Beltran MD; Sixto Kirk DO (06/12/2025)     See discharge assessment below for further details    Wrap Up  Wrap Up Additional Comments: Pt was admitted to [facility] 6/12-6/13/2025 for partial small bowel obstruction. Pt reports feeling better since discharge. Pt discharged with no new prescriptions. She reports understanding of discharge instructions. Verified pt upcoming PCP appt 6/18/2025 1500. Pt denies any questions, needs, or concerns at this time. She is encouraged to call if questions or needs arise. (6/16/2025 10:33 AM)  Call End Time: 1035 (6/16/2025 10:33 AM)    Engagement  Call Start Time: 1033 (6/16/2025 10:33 AM)    Medications  Does the patient have all medications ordered at discharge?: Not applicable (6/16/2025 10:33 AM)  Care Management Interventions: No intervention needed (6/16/2025 10:33 AM)  Is the patient taking all medications as directed (includes completed medication regime)?: Yes (6/16/2025 10:33 AM)    Appointments  Does the patient have a primary care provider?: Yes (6/16/2025 10:33 AM)  Care Management Interventions: Verified appointment date/time/provider (6/16/2025 10:33 AM)  Has the patient kept scheduled appointments due by today?: Yes (6/16/2025 10:33 AM)    Self Management  Has home health visited the patient within 72 hours of discharge?: Not applicable (6/16/2025 10:33 AM)    Patient Teaching  Does the patient have access to their discharge instructions?: Yes (6/16/2025 10:33 AM)  Care Management Interventions: Reviewed instructions with patient (6/16/2025 10:33 AM)  What is the patient's perception of their health status since discharge?: Improving (6/16/2025 10:33 AM)  Is the  patient/caregiver able to teach back the hierarchy of who to call/visit for symptoms/problems? PCP, Specialist, Home Health nurse, Urgent Care, ED, 911: Yes (6/16/2025 10:33 AM)

## 2025-06-18 ENCOUNTER — PHARMACY VISIT (OUTPATIENT)
Dept: PHARMACY | Facility: CLINIC | Age: 65
End: 2025-06-18
Payer: MEDICARE

## 2025-06-18 ENCOUNTER — OFFICE VISIT (OUTPATIENT)
Dept: PRIMARY CARE | Facility: CLINIC | Age: 65
End: 2025-06-18
Payer: MEDICARE

## 2025-06-18 VITALS
BODY MASS INDEX: 33.18 KG/M2 | HEART RATE: 77 BPM | HEIGHT: 69 IN | SYSTOLIC BLOOD PRESSURE: 96 MMHG | WEIGHT: 224 LBS | DIASTOLIC BLOOD PRESSURE: 62 MMHG

## 2025-06-18 DIAGNOSIS — E66.09 CLASS 1 OBESITY DUE TO EXCESS CALORIES WITH SERIOUS COMORBIDITY AND BODY MASS INDEX (BMI) OF 33.0 TO 33.9 IN ADULT: ICD-10-CM

## 2025-06-18 DIAGNOSIS — E66.811 CLASS 1 OBESITY DUE TO EXCESS CALORIES WITH SERIOUS COMORBIDITY AND BODY MASS INDEX (BMI) OF 33.0 TO 33.9 IN ADULT: ICD-10-CM

## 2025-06-18 DIAGNOSIS — K59.09 CHRONIC CONSTIPATION: ICD-10-CM

## 2025-06-18 DIAGNOSIS — I26.99 MULTIPLE PULMONARY EMBOLI (MULTI): ICD-10-CM

## 2025-06-18 DIAGNOSIS — K56.600 PARTIAL SMALL BOWEL OBSTRUCTION (MULTI): ICD-10-CM

## 2025-06-18 DIAGNOSIS — Z09 HOSPITAL DISCHARGE FOLLOW-UP: Primary | ICD-10-CM

## 2025-06-18 DIAGNOSIS — I10 BENIGN ESSENTIAL HYPERTENSION: ICD-10-CM

## 2025-06-18 PROCEDURE — 1159F MED LIST DOCD IN RCRD: CPT | Performed by: PHYSICIAN ASSISTANT

## 2025-06-18 PROCEDURE — 1160F RVW MEDS BY RX/DR IN RCRD: CPT | Performed by: PHYSICIAN ASSISTANT

## 2025-06-18 PROCEDURE — 1111F DSCHRG MED/CURRENT MED MERGE: CPT | Performed by: PHYSICIAN ASSISTANT

## 2025-06-18 PROCEDURE — 1036F TOBACCO NON-USER: CPT | Performed by: PHYSICIAN ASSISTANT

## 2025-06-18 PROCEDURE — 3074F SYST BP LT 130 MM HG: CPT | Performed by: PHYSICIAN ASSISTANT

## 2025-06-18 PROCEDURE — 3008F BODY MASS INDEX DOCD: CPT | Performed by: PHYSICIAN ASSISTANT

## 2025-06-18 PROCEDURE — 3078F DIAST BP <80 MM HG: CPT | Performed by: PHYSICIAN ASSISTANT

## 2025-06-18 PROCEDURE — 99495 TRANSJ CARE MGMT MOD F2F 14D: CPT | Performed by: PHYSICIAN ASSISTANT

## 2025-06-18 ASSESSMENT — ENCOUNTER SYMPTOMS
BRUISES/BLEEDS EASILY: 0
SORE THROAT: 0
BACK PAIN: 0
DIZZINESS: 0
WOUND: 0
CONFUSION: 0
SINUS PAIN: 0
NAUSEA: 0
FREQUENCY: 0
PALPITATIONS: 0
VOMITING: 0
EYE DISCHARGE: 0
FEVER: 0
RHINORRHEA: 0
FATIGUE: 0
FLANK PAIN: 0
CHEST TIGHTNESS: 0
WHEEZING: 0
EYE REDNESS: 0
HEADACHES: 0
ABDOMINAL PAIN: 0
CHILLS: 0
NUMBNESS: 0
DIARRHEA: 0
NECK PAIN: 0
TREMORS: 0
SHORTNESS OF BREATH: 0
CONSTIPATION: 1
SLEEP DISTURBANCE: 0
COUGH: 0

## 2025-06-18 ASSESSMENT — PATIENT HEALTH QUESTIONNAIRE - PHQ9
1. LITTLE INTEREST OR PLEASURE IN DOING THINGS: NOT AT ALL
SUM OF ALL RESPONSES TO PHQ9 QUESTIONS 1 AND 2: 0
2. FEELING DOWN, DEPRESSED OR HOPELESS: NOT AT ALL

## 2025-06-18 NOTE — PROGRESS NOTES
Subjective   Patient ID: Nusrta Irizarry is a 64 y.o. female who presents for Follow-up (ER F/U FOR SMALL BOWEL OBSTRUCTION)    HPI  Patient presents today for [facility] discharge   June 12, 2025 - through June 13, 2025 partial small bowel obstruction   CT noted partial distal small bowel obstrcution with transition in the RLW in the distal ileum.  She is on eliquis from PE.  She was admitted ot the floor NPO and on IV fluids.  Gen surgery consulted and discussed conservative treatment.  Improved to discharge the next day with reg diet as tolerated and follow up with PCP in 1 week   No other changes noted   No follow up with surgery noted   Pt did have good BM today and doing well   She is on meds to manage constipation and advised to call if she goes more than 2-3 days without BM        med check   hypotension -stable   L shoulder/back pain -previously following with ortho  hypothyroid - on meds   vit D -  on meds weekly - stop march 2025   Hyperchol - on meds   PD - on meds   B12 - on supplement   Breast cancer - onc - transferred to    Stable angina/esophageal spasm?     PE -eliquis      Preventative Testing   Mammo - 5/12/25  DEXA- april 2024 osteopenia   colonoscopy nov 2024 - dr chun - repeat in 5 years ?   PAP - 2018 - hx of hysterectomy   CT chest may 12 2025 and then CTA done in the hosp may  May 12, 2025- PE   Depression screen - PHQ2 NEG March 2025   Fall -NEG March 2025  Problem List[1]    Review of Systems   Constitutional:  Negative for chills, fatigue and fever.   HENT:  Negative for congestion, rhinorrhea, sinus pain, sore throat and tinnitus.    Eyes:  Negative for discharge, redness and visual disturbance.   Respiratory:  Negative for cough, chest tightness, shortness of breath and wheezing.    Cardiovascular:  Negative for chest pain, palpitations and leg swelling.   Gastrointestinal:  Positive for constipation. Negative for abdominal pain, diarrhea, nausea and vomiting.   Endocrine:  "Negative for cold intolerance and heat intolerance.   Genitourinary:  Negative for flank pain, frequency and urgency.   Musculoskeletal:  Negative for back pain, gait problem and neck pain.   Skin:  Negative for rash and wound.   Neurological:  Negative for dizziness, tremors, syncope, numbness and headaches.   Hematological:  Does not bruise/bleed easily.   Psychiatric/Behavioral:  Negative for confusion, sleep disturbance and suicidal ideas.        Medical History[2]    Surgical History[3]    Family History[4]    Social History[5]    Allergies[6]    Current Medications[7]    Objective   BP 96/62   Pulse 77   Ht 1.753 m (5' 9\")   Wt 102 kg (224 lb)   LMP  (LMP Unknown)   BMI 33.08 kg/m²     Physical Exam  Vitals reviewed.   Constitutional:       Appearance: Normal appearance. She is obese.   HENT:      Head: Normocephalic.      Right Ear: External ear normal.      Left Ear: External ear normal.      Nose: Nose normal. No congestion or rhinorrhea.      Mouth/Throat:      Mouth: Mucous membranes are moist.   Eyes:      Extraocular Movements: Extraocular movements intact.      Conjunctiva/sclera: Conjunctivae normal.      Pupils: Pupils are equal, round, and reactive to light.   Cardiovascular:      Rate and Rhythm: Normal rate and regular rhythm.      Pulses: Normal pulses.   Pulmonary:      Effort: Pulmonary effort is normal.      Breath sounds: Normal breath sounds.   Abdominal:      General: Bowel sounds are normal.      Palpations: Abdomen is soft.      Tenderness: There is no abdominal tenderness. There is no right CVA tenderness or left CVA tenderness.   Musculoskeletal:         General: No tenderness. Normal range of motion.      Cervical back: Normal range of motion and neck supple. No tenderness.   Skin:     General: Skin is warm and dry.   Neurological:      General: No focal deficit present.      Mental Status: She is alert and oriented to person, place, and time.   Psychiatric:         Mood and " Affect: Mood normal.         Behavior: Behavior normal.     Reviewed ER notes  Reviewed labs and CT ER         Impression    MDM    1) COMPLEXITY: MORE THAN 1 STABLE CHRONIC CONDITION ADDRESSED  2)DATA: TESTS INTERPRETED AND OR ORDERED, TOOK INDEPENDENT HISTORY OR RECORDS REVIEWED  3)RISK: MODERATE RISK DUE TO NATURE OF MEDICAL CONDITIONS/COMORBIDITY OR MEDICATIONS ORDERED OR SURGICAL OR PROCEDURE REFERRAL, .     Reviewed labs and Testing on file   Patient to follow diet low in cholesterol, fat, and sodium.    Patient is advised to increase Exercise.  Patient is recommended to lose weight.  Reviewed Meds and discussed common side effects  Continue as directed   Patient is strongly advised to be compliant with recommendations.    Return to Clinic sooner if needed.  Patient denies further questions/concerns at this time     Assessment/Plan   Problem List Items Addressed This Visit           ICD-10-CM    Class 1 obesity due to excess calories with serious comorbidity and body mass index (BMI) of 33.0 to 33.9 in adult E66.811, E66.09, Z68.33    Chronic constipation K59.09    Benign essential hypertension I10    Multiple pulmonary emboli (Multi) I26.99    Partial small bowel obstruction (Multi) K56.600     Other Visit Diagnoses         Codes      Hospital discharge follow-up    -  Primary Z09             FU as before in  sept          [1]   Patient Active Problem List  Diagnosis    Vitamin D deficiency    Tremors of nervous system    Spinal stenosis, lumbar region with neurogenic claudication    Secundum ASD (HHS-HCC)    Polyarthropathy    Pericarditis (HHS-HCC)    Pericardial effusion (HHS-HCC)    Parkinson's disease    Osteopenia    Orthostatic hypotension    Class 1 obesity due to excess calories with serious comorbidity and body mass index (BMI) of 33.0 to 33.9 in adult    Numbness and tingling in both hands    Nodule of left lung    Neuropathy    Mitral valve prolapse    Liver lesion    Kidney lesion    Glucose  intolerance (impaired glucose tolerance)    Hypothyroidism due to acquired atrophy of thyroid    Hypercholesterolemia with hypertriglyceridemia    History of carpal tunnel syndrome    Disc degeneration, lumbar    Closed fracture of shaft of fifth metacarpal bone    Closed fracture of head of radius    Chronic low back pain    Chronic constipation    Carpal tunnel syndrome, left    Carpal tunnel syndrome of right wrist    Benign essential hypertension    Ostium secundum atrial septal defect (HHS-HCC)    Calcific tendonitis of left shoulder    Calcific tendonitis of right shoulder    GERD (gastroesophageal reflux disease)    Postmenopausal bleeding    Recurrent syncope    Personal history of breast cancer    Primary osteoarthritis of both knees    Lumbar radiculopathy    Cluneal neuropathy    Multiple pulmonary emboli (Multi)    Partial small bowel obstruction (Multi)   [2]   Past Medical History:  Diagnosis Date    Acute renal insufficiency 07/12/2023    Breast cancer     Disease of thyroid gland     Effusion of left knee 08/08/2023    Hx antineoplastic chemo May 2020    Other specified postprocedural states     History of Papanicolaou smear    Parkinson's disease (tremor, stiffness, slow motion, unstable posture) (Multi)     Personal history of colonic polyps     Personal history of colonic polyps    Personal history of irradiation August 2020    Personal history of other diseases of the circulatory system     History of hypertension    Personal history of other endocrine, nutritional and metabolic disease     History of hypothyroidism    Personal history of other endocrine, nutritional and metabolic disease     History of hyperglycemia    Personal history of other malignant neoplasm of skin     History of malignant neoplasm of skin    Personal history of other medical treatment     History of screening mammography   [3]   Past Surgical History:  Procedure Laterality Date    APPENDECTOMY      Appendectomy    BREAST  LUMPECTOMY Right     CARDIAC SURGERY  2013    REPAIR OF HOLE IN HEART    COLONOSCOPY  11/18/2019    NORMAL; H/O COLONIC POLYPS IN PAST-REPEAT IN 5 YEARS    CT ANGIO CORONARY ART WITH HEARTFLOW IF SCORE >30%  09/16/2021    CT HEART CORONARY ANGIOGRAM 9/16/2021 [facility]    HYSTERECTOMY  04/16/2018    [facility]    INCISIONAL BREAST BIOPSY      Incisional Breast Biopsy    INJECTION Left 08/16/2024    Left Sacroiliac Joint Injection    INJECTION Bilateral 09/27/2024    superior Cluneal nerve block    IR INJECTION EPIDURAL STEROID N/A 03/01/2024    L5-S1    IR INJECTION NERVE BLOCK Bilateral 12/15/2023    Bilat L2/3 and L3/4 MBB    LYMPHADENECTOMY      axillary    OOPHORECTOMY  4/16\2018    OTHER SURGICAL HISTORY  10/02/2018    Bunion Correction By Osborne Procedure    OTHER SURGICAL HISTORY  01/05/2023    Intra-articular corticosteroid injection    OTHER SURGICAL HISTORY  01/21/2022    Epidural steroid injection    OTHER SURGICAL HISTORY  12/08/2021    Radiofrequency ablation    OTHER SURGICAL HISTORY  08/23/2021    Medial branch block    OTHER SURGICAL HISTORY  09/01/2021    Carpal tunnel surgery    OTHER SURGICAL HISTORY  09/23/2021    Medial branch block    OTHER SURGICAL HISTORY  06/17/2022    Epidural steroid injection    OTHER SURGICAL HISTORY  11/17/2022    Epidural steroid injection    RADIOFREQUENCY ABLATION Bilateral 06/28/2024    Bilat L3-5 MB RFA    RADIOFREQUENCY ABLATION Bilateral 10/25/2024    Bilat Superior Cluneal RFA    RADIOFREQUENCY ABLATION Bilateral 03/12/2025    Bilat L4/5-L5/S1 RFA    SACROILIAC JOINT INJECTION Right 04/26/2024    Rt SIJ    TONSILLECTOMY  1968    Tonsillectomy With Adenoidectomy    TUBAL LIGATION  1993    Tubal Ligation   [4]   Family History  Problem Relation Name Age of Onset    Heart disease Mother      Heart failure Father      Hyperthyroidism Sister      Lung cancer Sister      Breast cancer Other GRANDPARENT    [5]   Social History  Tobacco Use     Smoking status: Never    Smokeless tobacco: Never   Vaping Use    Vaping status: Never Used   Substance Use Topics    Alcohol use: Yes     Comment: occasional    Drug use: Never   [6]   Allergies  Allergen Reactions    Bee Venom Protein (Honey Bee) Anaphylaxis    Zoledronic Acid Swelling   [7]   Current Outpatient Medications   Medication Sig Dispense Refill    acetaminophen (Tylenol) 500 mg tablet Take 1 tablet (500 mg) by mouth every 6 hours if needed for mild pain (1 - 3).      amantadine (Symmetrel) 100 mg capsule Take 1 capsule by mouth two times a day. 60 capsule 11    apixaban (Eliquis) 5 mg (74 tabs) tablet Take 2 tablets (10 mg) by mouth 2 times a day for 7 days, THEN 1 tablet (5 mg) 2 times a day. 74 tablet 0    apixaban (Eliquis) 5 mg tablet Take 1 tablet (5 mg) by mouth 2 times a day. 60 tablet 5    aspirin 81 mg chewable tablet Chew 1 tablet (81 mg) once daily.      atorvastatin (Lipitor) 40 mg tablet Take 1 tablet (40 mg) by mouth once daily. 90 tablet 3    carbidopa-levodopa (Sinemet)  mg tablet Take 2 tablets by mouth five times a day. 8am,12pm, 6pm and 8pm and last dose upon waking up middle of the night around MIDNIGHT-2AM 900 tablet 3    diclofenac sodium (Voltaren) 1 % gel Apply 2 grams 4x/daily to affected area-left knee (Patient taking differently: 4 times a day as needed. Apply 2 grams 4x/daily to affected area-left knee) 100 g 1    entacapone (Comtan) 200 mg tablet Take 1 tablet by mouth four times daily. Take with levodopa/carbidopa 120 tablet 11    gabapentin (Neurontin) 300 mg capsule Take 1 capsule (300 mg) by mouth 4 times a day. 120 capsule 1    lactulose (Enulose) 20 gram/30 mL oral solution Take 15 mL by mouth once daily at bedtime. 473 mL 3    letrozole (Femara) 2.5 mg tablet Take 1 tablet (2.5 mg total) by mouth once daily. 90 tablet 2    levothyroxine (Synthroid, Levoxyl) 50 mcg tablet Take 1.5 tab(s) on Monday, Wednesday, and Friday and 1 tab daily on all other days 145  tablet 3    nitroglycerin (Nitrostat) 0.4 mg SL tablet Place 1 tablet (0.4 mg) under the tongue every 5 minutes if needed for chest pain. After the second dose call 911 25 tablet 1    polyethylene glycol (Glycolax, Miralax) 17 gram packet Take 17 g by mouth once daily. At 19:00       No current facility-administered medications for this visit.

## 2025-06-20 ENCOUNTER — PATIENT OUTREACH (OUTPATIENT)
Dept: PRIMARY CARE | Facility: CLINIC | Age: 65
End: 2025-06-20
Payer: MEDICARE

## 2025-06-20 NOTE — PROGRESS NOTES
Pt returned phone call:    Confirmation of at least 2 patient identifiers.    Completed telephonic follow-up with patient after recent visit with NIKOS Solomon 6/18/2025.      Spoke to patient during outreach call.    Patient reports feeling: Improved    Patient has questions or concerns about medications: No    Have all prescribed medications been filled? Yes    Patient has necessary resources to manage their care? Yes    Patient has questions or concerns? No    Next care management follow-up approximately within one month.    Pt denies any questions, needs, or concerns. She is encouraged to call if questions or needs arise.

## 2025-06-29 DIAGNOSIS — R20.0 NUMBNESS AND TINGLING IN BOTH HANDS: ICD-10-CM

## 2025-06-29 DIAGNOSIS — G62.9 NEUROPATHY: ICD-10-CM

## 2025-06-29 DIAGNOSIS — R20.2 NUMBNESS AND TINGLING IN BOTH HANDS: ICD-10-CM

## 2025-06-29 PROCEDURE — RXMED WILLOW AMBULATORY MEDICATION CHARGE

## 2025-06-30 PROCEDURE — RXMED WILLOW AMBULATORY MEDICATION CHARGE

## 2025-06-30 RX ORDER — GABAPENTIN 300 MG/1
300 CAPSULE ORAL 4 TIMES DAILY
Qty: 120 CAPSULE | Refills: 1 | Status: SHIPPED | OUTPATIENT
Start: 2025-06-30

## 2025-07-02 PROCEDURE — RXMED WILLOW AMBULATORY MEDICATION CHARGE

## 2025-07-03 ENCOUNTER — PHARMACY VISIT (OUTPATIENT)
Dept: PHARMACY | Facility: CLINIC | Age: 65
End: 2025-07-03
Payer: MEDICARE

## 2025-07-12 ENCOUNTER — PHARMACY VISIT (OUTPATIENT)
Dept: PHARMACY | Facility: CLINIC | Age: 65
End: 2025-07-12
Payer: MEDICARE

## 2025-07-12 PROCEDURE — RXMED WILLOW AMBULATORY MEDICATION CHARGE

## 2025-07-17 ENCOUNTER — OFFICE VISIT (OUTPATIENT)
Dept: PAIN MEDICINE | Facility: CLINIC | Age: 65
End: 2025-07-17
Payer: MEDICARE

## 2025-07-17 VITALS
DIASTOLIC BLOOD PRESSURE: 81 MMHG | HEART RATE: 83 BPM | SYSTOLIC BLOOD PRESSURE: 163 MMHG | RESPIRATION RATE: 16 BRPM | BODY MASS INDEX: 32.49 KG/M2 | WEIGHT: 220 LBS

## 2025-07-17 DIAGNOSIS — M46.1 SACROILIITIS: Primary | ICD-10-CM

## 2025-07-17 DIAGNOSIS — G58.8 CLUNEAL NEUROPATHY: ICD-10-CM

## 2025-07-17 PROCEDURE — 99213 OFFICE O/P EST LOW 20 MIN: CPT

## 2025-07-17 RX ORDER — LIDOCAINE HYDROCHLORIDE 20 MG/ML
6 INJECTION, SOLUTION EPIDURAL; INFILTRATION; INTRACAUDAL; PERINEURAL ONCE
OUTPATIENT
Start: 2025-07-17 | End: 2025-07-17

## 2025-07-17 RX ORDER — BUPIVACAINE HYDROCHLORIDE 5 MG/ML
4 INJECTION, SOLUTION EPIDURAL; INTRACAUDAL; PERINEURAL ONCE
OUTPATIENT
Start: 2025-07-17 | End: 2025-07-17

## 2025-07-17 RX ORDER — METHYLPREDNISOLONE ACETATE 40 MG/ML
40 INJECTION, SUSPENSION INTRA-ARTICULAR; INTRALESIONAL; INTRAMUSCULAR; SOFT TISSUE ONCE
OUTPATIENT
Start: 2025-07-17 | End: 2025-07-17

## 2025-07-17 ASSESSMENT — ENCOUNTER SYMPTOMS
COUGH: 0
BRUISES/BLEEDS EASILY: 0
ALLERGIC/IMMUNOLOGIC NEGATIVE: 1
ENDOCRINE NEGATIVE: 1
DYSPHORIC MOOD: 0
LIGHT-HEADEDNESS: 0
SHORTNESS OF BREATH: 0
WHEEZING: 0
ADENOPATHY: 0
PALPITATIONS: 0
ARTHRALGIAS: 0
FACIAL ASYMMETRY: 0
EYES NEGATIVE: 1
CONSTITUTIONAL NEGATIVE: 1
BACK PAIN: 1
MYALGIAS: 1
WEAKNESS: 0

## 2025-07-17 NOTE — H&P (VIEW-ONLY)
Subjective   Patient ID: Nusrat Irizarry is a 64 y.o. female who presents for Follow-up (For Bilat L4/5-L5/S1 RFA on 3/12/25 she reports 75-80% relief for 6 weeks, she was very happy with the relief.  Today she reports having pain in her bilat lower back R>L and bilat hips rates 2/10 now and 7/10 standing, loading , laying in bed she has to adjust a lot, due to hip pain R>L,  when she lifts her rt leg up she feels it pulling in her back. She denies any numbness or tingling. anymore. ) She is taking Tylenol, gabapentin 300 mg 4 times a day from her PCP for neuropathy pain, she is walking with a cane. She is on Eliquis and cannot take NSAIDs she had a chest scan and multiple Pulmonary Emboli  were found. She wants to discuss an injection.   ELIZABETH score 58%. Alcohol screen negative.   Mel Reddy RN 07/17/25 10:47 AM     The patient is a 64-year-old female presents today for a follow-up appointment after undergoing a bilateral medial branch RFA covering L4-S1 on 3/12/2025.  She reports significant relief from this procedure with 70% pain relief that lasted for the first 6 weeks following the ablation before the pain returned.  In the interim since then she was found to have numerous PEs, and was found to have a blood clotting disorder and is maintained on Eliquis for it.  She currently rates the pain a 2/10, but states it can get up to 7/10 at its worst with standing, walking, loading the  and performing various ADLs, she does have to readjust in bed a lot due to the pain.  The pain is across her lower back, more so in her coccyx region.  She receives gabapentin from another provider.  She is interested in injections to help with the pain.        Review of Systems   Constitutional: Negative.    HENT: Negative.     Eyes: Negative.    Respiratory:  Negative for cough, shortness of breath and wheezing.    Cardiovascular:  Negative for chest pain, palpitations and leg swelling.   Endocrine: Negative.     Genitourinary: Negative.    Musculoskeletal:  Positive for back pain and myalgias. Negative for arthralgias.   Skin: Negative.    Allergic/Immunologic: Negative.    Neurological:  Negative for facial asymmetry, weakness and light-headedness.   Hematological:  Negative for adenopathy. Does not bruise/bleed easily.   Psychiatric/Behavioral:  Negative for dysphoric mood and suicidal ideas.        Objective   Physical Exam  Constitutional:       General: She is not in acute distress.     Appearance: Normal appearance.   HENT:      Head: Normocephalic.      Mouth/Throat:      Mouth: Mucous membranes are moist.     Eyes:      Extraocular Movements: Extraocular movements intact.       Cardiovascular:      Rate and Rhythm: Normal rate and regular rhythm.      Pulses: Normal pulses.      Heart sounds: Normal heart sounds. No murmur heard.     No friction rub. No gallop.   Pulmonary:      Effort: Pulmonary effort is normal.      Breath sounds: Normal breath sounds. No wheezing, rhonchi or rales.   Abdominal:      General: Abdomen is flat.      Palpations: Abdomen is soft.     Musculoskeletal:      Cervical back: Normal range of motion.      Right lower leg: No edema.      Left lower leg: No edema.      Comments: Ambulates without assistance  Strength 5/5 BLE  Toi finger positive bilaterally  PEPE positive bilaterally  Compression positive bilaterally  Distraction positive bilaterally   Lymphadenopathy:      Cervical: No cervical adenopathy.     Skin:     General: Skin is warm and dry.     Neurological:      General: No focal deficit present.      Mental Status: She is alert and oriented to person, place, and time. Mental status is at baseline.     Psychiatric:         Mood and Affect: Mood normal.         Behavior: Behavior normal.         Assessment/Plan   Diagnoses and all orders for this visit:  Sacroiliitis  -     FL pain management; Future  -     Sacroiliac Joint Injection; Future  Cluneal neuropathy  Other  orders  -     lidocaine PF (Xylocaine) 20 mg/mL (2 %) injection 120 mg  -     iohexol (OMNIPaque) 300 mg iodine/mL solution 3 mL  -     bupivacaine PF (Marcaine) 0.5 % (5 mg/mL) injection 20 mg  -     methylPREDNISolone acetate (DEPO-Medrol) injection 40 mg  -     NPO Diet Except: Sips with meds; Effective now; Standing  -     Height and weight; Standing  -     Adult diet Regular; Standing  -     Vital Signs; Standing  -     Neuro checks; Standing  -     Notify provider (specify parameters); Standing  -     Prior to Discharge O2 Weaning; Standing  -     Pulse oximetry, continuous; Standing  -     Discharge patient; Standing       The patient is a 64-year-old female with a past medical history significant for the above-mentioned problems.  She is following up after lumbar RFA with significant relief lasting for 6 weeks.  The pain is since returned.  We reviewed her imaging and based on the findings, her pain pattern, her physical exam, I recommend pursuing bilateral sacroiliac joint injections under fluoroscopy.  The procedure was discussed, risks and benefits were discussed, patient is agreeable.  She will follow-up after the injection for reevaluation, call clinic sooner if needed.

## 2025-07-18 ENCOUNTER — TELEPHONE (OUTPATIENT)
Dept: PAIN MEDICINE | Facility: CLINIC | Age: 65
End: 2025-07-18
Payer: MEDICARE

## 2025-07-24 ENCOUNTER — TELEPHONE (OUTPATIENT)
Dept: PRIMARY CARE | Facility: CLINIC | Age: 65
End: 2025-07-24

## 2025-07-24 ENCOUNTER — APPOINTMENT (OUTPATIENT)
Dept: ORTHOPEDIC SURGERY | Facility: CLINIC | Age: 65
End: 2025-07-24
Payer: MEDICARE

## 2025-07-24 NOTE — TELEPHONE ENCOUNTER
LEFT MESSAGE ON PT VOICEMAIL REGARDING A RECORD RELEASE REQUEST FROM [employer] [employer]. PT NEEDS TO COME IN AND SIGN A RECORD RELEASE FORM IF SHE IS AGREEABLE TO US RELEASING INFORMATION TO [employer].

## 2025-07-29 ENCOUNTER — TELEPHONE (OUTPATIENT)
Dept: PRIMARY CARE | Facility: CLINIC | Age: 65
End: 2025-07-29
Payer: MEDICARE

## 2025-07-29 ENCOUNTER — PATIENT OUTREACH (OUTPATIENT)
Dept: PRIMARY CARE | Facility: CLINIC | Age: 65
End: 2025-07-29
Payer: MEDICARE

## 2025-07-29 ENCOUNTER — HOSPITAL ENCOUNTER (OUTPATIENT)
Dept: OPERATING ROOM | Facility: HOSPITAL | Age: 65
Setting detail: OUTPATIENT SURGERY
Discharge: HOME | End: 2025-07-29
Payer: MEDICARE

## 2025-07-29 VITALS
SYSTOLIC BLOOD PRESSURE: 133 MMHG | RESPIRATION RATE: 16 BRPM | TEMPERATURE: 97.3 F | DIASTOLIC BLOOD PRESSURE: 89 MMHG | OXYGEN SATURATION: 97 % | HEIGHT: 69 IN | BODY MASS INDEX: 32.58 KG/M2 | HEART RATE: 67 BPM | WEIGHT: 220 LBS

## 2025-07-29 DIAGNOSIS — M46.1 SACROILIITIS: ICD-10-CM

## 2025-07-29 PROCEDURE — 2500000004 HC RX 250 GENERAL PHARMACY W/ HCPCS (ALT 636 FOR OP/ED): Mod: JZ | Performed by: STUDENT IN AN ORGANIZED HEALTH CARE EDUCATION/TRAINING PROGRAM

## 2025-07-29 PROCEDURE — 2550000001 HC RX 255 CONTRASTS

## 2025-07-29 PROCEDURE — 27096 INJECT SACROILIAC JOINT: CPT | Mod: 50 | Performed by: STUDENT IN AN ORGANIZED HEALTH CARE EDUCATION/TRAINING PROGRAM

## 2025-07-29 PROCEDURE — 7100000010 HC PHASE TWO TIME - EACH INCREMENTAL 1 MINUTE

## 2025-07-29 PROCEDURE — 2500000004 HC RX 250 GENERAL PHARMACY W/ HCPCS (ALT 636 FOR OP/ED)

## 2025-07-29 PROCEDURE — 7100000009 HC PHASE TWO TIME - INITIAL BASE CHARGE

## 2025-07-29 RX ORDER — METHYLPREDNISOLONE ACETATE 40 MG/ML
40 INJECTION, SUSPENSION INTRA-ARTICULAR; INTRALESIONAL; INTRAMUSCULAR; SOFT TISSUE ONCE
Status: DISCONTINUED | OUTPATIENT
Start: 2025-07-29 | End: 2025-07-30 | Stop reason: HOSPADM

## 2025-07-29 RX ORDER — METHYLPREDNISOLONE ACETATE 40 MG/ML
INJECTION, SUSPENSION INTRA-ARTICULAR; INTRALESIONAL; INTRAMUSCULAR; SOFT TISSUE AS NEEDED
Status: COMPLETED | OUTPATIENT
Start: 2025-07-29 | End: 2025-07-29

## 2025-07-29 RX ORDER — BUPIVACAINE HYDROCHLORIDE 5 MG/ML
4 INJECTION, SOLUTION EPIDURAL; INTRACAUDAL; PERINEURAL ONCE
Status: DISCONTINUED | OUTPATIENT
Start: 2025-07-29 | End: 2025-07-30 | Stop reason: HOSPADM

## 2025-07-29 RX ORDER — LIDOCAINE HYDROCHLORIDE 20 MG/ML
6 INJECTION, SOLUTION EPIDURAL; INFILTRATION; INTRACAUDAL; PERINEURAL ONCE
Status: COMPLETED | OUTPATIENT
Start: 2025-07-29 | End: 2025-07-29

## 2025-07-29 RX ADMIN — LIDOCAINE HYDROCHLORIDE 5 ML: 20 INJECTION, SOLUTION EPIDURAL; INFILTRATION; INTRACAUDAL; PERINEURAL at 10:21

## 2025-07-29 RX ADMIN — IOHEXOL 1 ML: 300 INJECTION, SOLUTION INTRAVENOUS at 10:21

## 2025-07-29 RX ADMIN — METHYLPREDNISOLONE ACETATE 40 MG: 40 INJECTION, SUSPENSION INTRA-ARTICULAR; INTRALESIONAL; INTRAMUSCULAR; SOFT TISSUE at 10:21

## 2025-07-29 ASSESSMENT — PAIN - FUNCTIONAL ASSESSMENT
PAIN_FUNCTIONAL_ASSESSMENT: 0-10
PAIN_FUNCTIONAL_ASSESSMENT: 0-10

## 2025-07-29 ASSESSMENT — PAIN SCALES - GENERAL
PAINLEVEL_OUTOF10: 4
PAINLEVEL_OUTOF10: 0 - NO PAIN

## 2025-07-29 NOTE — PROGRESS NOTES
Successful outreach to patient regarding hospitalization as patient continues TCM program.   At time of outreach call the patient feels as if their condition has improved since initial visit with PCP or specialist.  Questions or concerns addressed at this time with patient.     -Verified next PCP appt 9/18/2025 1140    Pt denies any questions, needs, or concerns. She reports she has her medication and everything she needs at home. Pt denies any current needs from PCP.    Pt encouraged to call if questions or needs arise.

## 2025-07-29 NOTE — PERIOPERATIVE NURSING NOTE
Patient arrived back to unit.  Awake and tolerated well.  Provided with a beverage.  Latha Briones at bedside.

## 2025-07-30 PROCEDURE — RXMED WILLOW AMBULATORY MEDICATION CHARGE

## 2025-08-01 ENCOUNTER — PHARMACY VISIT (OUTPATIENT)
Dept: PHARMACY | Facility: CLINIC | Age: 65
End: 2025-08-01
Payer: MEDICARE

## 2025-08-05 ENCOUNTER — PHARMACY VISIT (OUTPATIENT)
Dept: PHARMACY | Facility: CLINIC | Age: 65
End: 2025-08-05
Payer: MEDICARE

## 2025-08-05 DIAGNOSIS — Z85.3 PERSONAL HISTORY OF BREAST CANCER: ICD-10-CM

## 2025-08-05 DIAGNOSIS — G62.9 NEUROPATHY: ICD-10-CM

## 2025-08-05 PROCEDURE — RXMED WILLOW AMBULATORY MEDICATION CHARGE

## 2025-08-05 RX ORDER — LETROZOLE 2.5 MG/1
2.5 TABLET, FILM COATED ORAL DAILY
Qty: 90 TABLET | Refills: 2 | Status: SHIPPED | OUTPATIENT
Start: 2025-08-05

## 2025-08-11 ENCOUNTER — SPECIALTY PHARMACY (OUTPATIENT)
Dept: PHARMACY | Facility: CLINIC | Age: 65
End: 2025-08-11

## 2025-08-11 PROCEDURE — RXMED WILLOW AMBULATORY MEDICATION CHARGE

## 2025-08-12 ENCOUNTER — PHARMACY VISIT (OUTPATIENT)
Dept: PHARMACY | Facility: CLINIC | Age: 65
End: 2025-08-12
Payer: MEDICARE

## 2025-08-15 ENCOUNTER — SPECIALTY PHARMACY (OUTPATIENT)
Dept: PHARMACY | Facility: CLINIC | Age: 65
End: 2025-08-15

## 2025-08-25 ENCOUNTER — OFFICE VISIT (OUTPATIENT)
Dept: PAIN MEDICINE | Facility: CLINIC | Age: 65
End: 2025-08-25
Payer: MEDICARE

## 2025-08-25 VITALS — HEART RATE: 80 BPM | SYSTOLIC BLOOD PRESSURE: 115 MMHG | RESPIRATION RATE: 16 BRPM | DIASTOLIC BLOOD PRESSURE: 78 MMHG

## 2025-08-25 DIAGNOSIS — M48.062 SPINAL STENOSIS, LUMBAR REGION WITH NEUROGENIC CLAUDICATION: ICD-10-CM

## 2025-08-25 DIAGNOSIS — G58.8 CLUNEAL NEUROPATHY: Primary | ICD-10-CM

## 2025-08-25 PROCEDURE — 99214 OFFICE O/P EST MOD 30 MIN: CPT

## 2025-08-25 RX ORDER — LIDOCAINE HYDROCHLORIDE 20 MG/ML
10 INJECTION, SOLUTION EPIDURAL; INFILTRATION; INTRACAUDAL; PERINEURAL ONCE
OUTPATIENT
Start: 2025-08-25 | End: 2025-08-25

## 2025-08-25 ASSESSMENT — ENCOUNTER SYMPTOMS
ADENOPATHY: 0
ARTHRALGIAS: 0
FACIAL ASYMMETRY: 0
WHEEZING: 0
EYES NEGATIVE: 1
ALLERGIC/IMMUNOLOGIC NEGATIVE: 1
MYALGIAS: 1
PALPITATIONS: 0
LIGHT-HEADEDNESS: 0
SHORTNESS OF BREATH: 0
WEAKNESS: 0
ENDOCRINE NEGATIVE: 1
BRUISES/BLEEDS EASILY: 0
COUGH: 0
CONSTITUTIONAL NEGATIVE: 1
DYSPHORIC MOOD: 0
BACK PAIN: 1

## 2025-08-26 ENCOUNTER — TELEPHONE (OUTPATIENT)
Dept: PAIN MEDICINE | Facility: CLINIC | Age: 65
End: 2025-08-26
Payer: MEDICARE

## 2025-08-28 DIAGNOSIS — G62.9 NEUROPATHY: ICD-10-CM

## 2025-08-28 DIAGNOSIS — R20.2 NUMBNESS AND TINGLING IN BOTH HANDS: ICD-10-CM

## 2025-08-28 DIAGNOSIS — R20.0 NUMBNESS AND TINGLING IN BOTH HANDS: ICD-10-CM

## 2025-08-28 PROCEDURE — RXMED WILLOW AMBULATORY MEDICATION CHARGE

## 2025-08-28 RX ORDER — GABAPENTIN 300 MG/1
300 CAPSULE ORAL 4 TIMES DAILY
Qty: 120 CAPSULE | Refills: 1 | Status: SHIPPED | OUTPATIENT
Start: 2025-08-28

## 2025-08-31 DIAGNOSIS — K59.01 SLOW TRANSIT CONSTIPATION: ICD-10-CM

## 2025-09-01 ENCOUNTER — PHARMACY VISIT (OUTPATIENT)
Dept: PHARMACY | Facility: CLINIC | Age: 65
End: 2025-09-01
Payer: MEDICARE

## 2025-09-02 PROCEDURE — RXMED WILLOW AMBULATORY MEDICATION CHARGE

## 2025-09-02 RX ORDER — LACTULOSE 10 G/15ML
SOLUTION ORAL; RECTAL NIGHTLY
Qty: 473 ML | Refills: 3 | Status: SHIPPED | OUTPATIENT
Start: 2025-09-02

## 2025-09-04 ENCOUNTER — PHARMACY VISIT (OUTPATIENT)
Dept: PHARMACY | Facility: CLINIC | Age: 65
End: 2025-09-04
Payer: MEDICARE

## 2025-09-04 ENCOUNTER — PATIENT OUTREACH (OUTPATIENT)
Dept: PRIMARY CARE | Facility: CLINIC | Age: 65
End: 2025-09-04
Payer: MEDICARE

## 2025-09-18 ENCOUNTER — APPOINTMENT (OUTPATIENT)
Dept: PRIMARY CARE | Facility: CLINIC | Age: 65
End: 2025-09-18
Payer: MEDICARE

## 2026-03-13 ENCOUNTER — APPOINTMENT (OUTPATIENT)
Dept: CARDIOLOGY | Facility: CLINIC | Age: 66
End: 2026-03-13
Payer: MEDICARE

## 2026-05-26 ENCOUNTER — APPOINTMENT (OUTPATIENT)
Dept: CARDIOLOGY | Facility: CLINIC | Age: 66
End: 2026-05-26
Payer: MEDICARE